# Patient Record
Sex: MALE | Race: BLACK OR AFRICAN AMERICAN | Employment: FULL TIME | URBAN - METROPOLITAN AREA
[De-identification: names, ages, dates, MRNs, and addresses within clinical notes are randomized per-mention and may not be internally consistent; named-entity substitution may affect disease eponyms.]

---

## 2017-04-14 ENCOUNTER — HOSPITAL ENCOUNTER (EMERGENCY)
Age: 62
Discharge: HOME | End: 2017-04-14
Attending: EMERGENCY MEDICINE
Payer: COMMERCIAL

## 2017-04-14 ENCOUNTER — APPOINTMENT (OUTPATIENT)
Dept: GENERAL RADIOLOGY | Age: 62
End: 2017-04-14
Attending: EMERGENCY MEDICINE
Payer: COMMERCIAL

## 2017-04-14 VITALS
RESPIRATION RATE: 18 BRPM | TEMPERATURE: 98.3 F | OXYGEN SATURATION: 96 % | WEIGHT: 221.2 LBS | HEIGHT: 69 IN | SYSTOLIC BLOOD PRESSURE: 149 MMHG | BODY MASS INDEX: 32.76 KG/M2 | DIASTOLIC BLOOD PRESSURE: 91 MMHG | HEART RATE: 77 BPM

## 2017-04-14 DIAGNOSIS — J30.89 ENVIRONMENTAL AND SEASONAL ALLERGIES: ICD-10-CM

## 2017-04-14 DIAGNOSIS — J02.9 ACUTE VIRAL PHARYNGITIS: Primary | ICD-10-CM

## 2017-04-14 LAB
GLUCOSE BLD STRIP.AUTO-MCNC: 117 MG/DL (ref 65–100)
SERVICE CMNT-IMP: ABNORMAL

## 2017-04-14 PROCEDURE — 70360 X-RAY EXAM OF NECK: CPT

## 2017-04-14 PROCEDURE — 99283 EMERGENCY DEPT VISIT LOW MDM: CPT

## 2017-04-14 PROCEDURE — 71020 XR CHEST PA LAT: CPT

## 2017-04-14 PROCEDURE — 82962 GLUCOSE BLOOD TEST: CPT

## 2017-04-14 PROCEDURE — 87070 CULTURE OTHR SPECIMN AEROBIC: CPT | Performed by: EMERGENCY MEDICINE

## 2017-04-14 NOTE — ED TRIAGE NOTES
Triage note: Pt states he has been experiencing nasal drainage since yesterday and has not used OTC treatments.

## 2017-04-14 NOTE — ED PROVIDER NOTES
HPI Comments: 62 y.o. male with no significant past medical history who presents from home with chief complaint of sore throat. Pt states that he started having diaphoresis, sore throat, weakness, subjective fever, ear tingling, chills, hoarse voice, and a non productive cough 24 hours ago at work. Pt says that he has had difficulty swallowing solids with the sore throat. He says that he has the same symptoms every year when the pollen starts coming out. He does not have a PCP and has not had a regular check up in 4 years. He has not taken any medications for the symptoms other than some Halls for the sore throat. He was holding his grandson last week who he thinks may have had strep and similar symptoms. Pt denies any chest pain, SOB, ear pain, HA, or hearing loss. Pt also denies any wheezing but his wife states that she thinks he has been wheezing some. Wife states that  drinks a lot of fluids and urinates frequently during the night. Pt has no history of asthma or eczema. There are no other acute medical concerns at this time. 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 Denies tobacco use. Significant FMHx: Sisters have diabetes  PCP: None    Old Chart Review:Seen September 2015 for itchy rash and July 2015 for back pain and sinusitis    Note written by Jag Zaman, as dictated by Hallie Zarate DO 9:06 AM      The history is provided by the patient and the spouse. No  was used. History reviewed. No pertinent past medical history. No hx of DM  History reviewed. No pertinent surgical history. History reviewed. No pertinent family history. Social History     Social History    Marital status:      Spouse name: N/A    Number of children: N/A    Years of education: N/A     Occupational History    Not on file. Social History Main Topics    Smoking status: Never Smoker    Smokeless tobacco: Never Used    Alcohol use No    Drug use:  No  Sexual activity: Not on file     Other Topics Concern    Not on file     Social History Narrative         ALLERGIES: Review of patient's allergies indicates no known allergies. Review of Systems   Constitutional: Positive for chills, diaphoresis and fever. Negative for appetite change. HENT: Positive for sore throat. Negative for ear pain, hearing loss and voice change. Respiratory: Positive for cough and wheezing. Negative for shortness of breath. Cardiovascular: Negative for chest pain. Neurological: Positive for weakness. Negative for headaches. All other systems reviewed and are negative. Vitals:    04/14/17 0821   BP: (!) 149/91   Pulse: 77   Resp: 18   Temp: 98.3 °F (36.8 °C)   SpO2: 96%   Weight: 100.3 kg (221 lb 3.2 oz)   Height: 5' 9\" (1.753 m)            Physical Exam      Constitutional: Pt is awake and alert. Pt appears well-developed and well-nourished. NAD. HENT:   Head: Normocephalic and atraumatic. Nose: Nose normal.   Mouth/Throat: Oropharynx is clear and moist. No oropharyngeal exudate. Moist mucous membranes. Uvula midline. Slight fullness on both sides of the uvula. No drooling. Normal Voice. Eyes: Conjunctivae and extraocular motions are normal. Pupils are equal, round, and reactive to light. Ears:Small amount of fluid behind the L and R TM. Good cone of light reflex in both TMs bilaterally. Right eye exhibits no discharge. Left eye exhibits no discharge. No scleral icterus. Neck: No tracheal deviation present. Supple neck. No adenopathy  Cardiovascular: Normal rate, regular rhythm, normal heart sounds and intact distal pulses. Exam reveals no gallop and no friction rub. No murmur heard. Pulmonary/Chest: Effort normal and breath sounds normal.  Pt  has no wheezes. Pt  has no rales. Abdominal: Soft. Pt  exhibits no distension and no mass. No tenderness. Pt  has no rebound and no guarding. Musculoskeletal:  Pt  exhibits no edema and no tenderness. Ext: Normal ROM in all four extremities; not tender to palpation; distal pulses are normal, no edema. Neurological:  Pt is alert. nonfocal neuro exam.  Skin: Skin is warm and dry. Pt  is not diaphoretic. Psychiatric:  Pt  has a normal mood and affect. Behavior is normal.     Note written by Jag Mendez, as dictated by No att. providers found 9:10 AM    [facility]  ED Course       Procedures      PROGRESS NOTE:  8:59 AM  Reviewing xray of chest and neck     Labs Reviewed   GLUCOSE, POC - Abnormal; Notable for the following:        Result Value    Glucose (POC) 117 (*)     All other components within normal limits   CULTURE, THROAT       Could be viral illness  Could also be seasonal allergies. pollen count very high these days in [place]. DC home  otc meds  Referral to a new PCP to establish care. Wife will try her albuterol inhaler if he wheezes again.

## 2017-04-14 NOTE — DISCHARGE INSTRUCTIONS
Tylenol, ibuprofen as needed for fevers, aches, pain  Go to the drug store and buy an over the counter steroid nasal spray like NASONEX/FLONASE. Ask the pharmacist for help if needed. Return if your throat swells, you have difficulty talking, eating, drinking or have uncontrollable drooling. I think this is either a virus from your grandkids or a reaction to the pollen. We hope that we have addressed all of your medical concerns. The examination and treatment you received in the Emergency Department were for an emergent problem and were not intended as complete care. It is important that you follow up with your healthcare provider(s) for ongoing care. If your symptoms worsen or do not improve as expected, and you are unable to reach your usual health care provider(s), you should return to the Emergency Department. Today's healthcare is undergoing tremendous change, and patient satisfaction surveys are one of the many tools to assess the quality of medical care. You may receive a survey from the [employer] regarding your experience in the Emergency Department. I hope that your experience has been completely positive, particularly the medical care that I provided. As such, please participate in the survey; anything less than excellent does not meet my expectations or intentions. [facility] and [facility] participate in nationally recognized quality of care measures. If your blood pressure is greater than 120/80, as reported below, we urge that you seek medical care to address the potential of high blood pressure, commonly known as hypertension. Hypertension can be hereditary or can be caused by certain medical conditions, pain, stress, or \"white coat syndrome. \"       Please make an appointment with your health care provider(s) for follow up of your Emergency Department visit.        VITALS:   Patient Vitals for the past 8 hrs: Temp Pulse Resp BP SpO2   04/14/17 0821 98.3 °F (36.8 °C) 77 18 (!) 149/91 96 %          Thank you for allowing us to provide you with medical care today. We realize that you have many choices for your emergency care needs. Please choose us in the future for any continued health care needs. Regards,           Nico Olmstead, [facility]   Office: [phone]            Recent Results (from the past 24 hour(s))   GLUCOSE, POC    Collection Time: 04/14/17  8:54 AM   Result Value Ref Range    Glucose (POC) 117 (H) 65 - 100 mg/dL    Performed by [employer]        Xr Neck Soft Tissue    Result Date: 4/14/2017  INDICATION:   Allergies and sore throat EXAMINATION:  NECK FOR SOFT TISSUE COMPARISON: None FINDINGS: AP and lateral views of the cervical soft tissues demonstrate no prevertebral soft tissue swelling. The epiglottis is normal. There is no radiopaque foreign body. IMPRESSION: No acute process. Xr Chest Pa Lat    Result Date: 4/14/2017  INDICATION:   Allergies, sore throat COMPARISON: None FINDINGS: Frontal and lateral views of the chest demonstrate a normal cardiomediastinal silhouette. The lungs are adequately expanded. There is no edema, effusion, consolidation, or pneumothorax. Mild degenerative changes in the spine. IMPRESSION: No acute process. Managing Your Allergies: Care Instructions  Your Care Instructions  Managing your allergies is an important part of staying healthy. Your doctor will help you find out what may be causing the allergies. Common causes of allergy symptoms are house dust and dust mites, animal dander, mold, and pollen. As soon as you know what triggers your symptoms, try to reduce your exposure to your triggers. This can help prevent allergy symptoms, asthma, and other health problems. Ask your doctor about allergy medicine or immunotherapy. These treatments may help reduce or prevent allergy symptoms.   Follow-up care is a key part of your treatment and safety. Be sure to make and go to all appointments, and call your doctor if you are having problems. It's also a good idea to know your test results and keep a list of the medicines you take. How can you care for yourself at home? · If you think that dust or dust mites are causing your allergies:  ¨ Wash sheets, pillowcases, and other bedding every week in hot water. ¨ Use airtight, dust-proof covers for pillows, duvets, and mattresses. Avoid plastic covers, because they tend to tear quickly and do not \"breathe. \" Wash according to the instructions. ¨ Remove extra blankets and pillows that you don't need. ¨ Use blankets that are machine-washable. ¨ Don't use home humidifiers. They can help mites live longer. · Use air-conditioning. Change or clean all filters every month. Keep windows closed. Use high-efficiency air filters. Don't use window or attic fans, which draw dust into the air. · If you're allergic to pet dander, keep pets outside or, at the very least, out of your bedroom. Old carpet and cloth-covered furniture can hold a lot of animal dander. You may need to replace them. · Look for signs of cockroaches. Use cockroach baits to get rid of them. Then clean your home well. · If you're allergic to mold, don't keep indoor plants, because molds can grow in soil. Get rid of furniture, rugs, and drapes that smell musty. Check for mold in the bathroom. · If you're allergic to pollen, stay inside when pollen counts are high. · Don't smoke or let anyone else smoke in your house. Don't use fireplaces or wood-burning stoves. Avoid paint fumes, perfumes, and other strong odors. When should you call for help? Give an epinephrine shot if:  · You think you are having a severe allergic reaction. · You have symptoms in more than one body area, such as mild nausea and an itchy mouth. After giving an epinephrine shot call 911, even if you feel better.   Call 911 if:  · You have symptoms of a severe allergic reaction. These may include:  ¨ Sudden raised, red areas (hives) all over your body. ¨ Swelling of the throat, mouth, lips, or tongue. ¨ Trouble breathing. ¨ Passing out (losing consciousness). Or you may feel very lightheaded or suddenly feel weak, confused, or restless. · You have been given an epinephrine shot, even if you feel better. Call your doctor now or seek immediate medical care if:  · You have symptoms of an allergic reaction, such as:  ¨ A rash or hives (raised, red areas on the skin). ¨ Itching. ¨ Swelling. ¨ Belly pain, nausea, or vomiting. Watch closely for changes in your health, and be sure to contact your doctor if:  · Your allergies get worse. · You need help controlling your allergies. · You have questions about allergy testing. · You do not get better as expected. Where can you learn more? Go to http://triSironaefraín.info/. Enter L249 in the search box to learn more about \"Managing Your Allergies: Care Instructions. \"  Current as of: February 12, 2016  Content Version: 11.2  © 9682-8544 [employer]. Care instructions adapted under license by [employer] (which disclaims liability or warranty for this information). If you have questions about a medical condition or this instruction, always ask your healthcare professional. [address] any warranty or liability for your use of this information. Throat Culture: About This Test  What is it? A throat culture is a test to find a bacterial or fungal infection in the throat. Why is this test done? A throat culture may be done to:  · Find the cause of a sore throat. Most sore throat infections are caused by a virus. A throat culture shows the difference between a bacterial infection and a viral infection. · Check a person who may not have any symptoms of infection but who carries bacteria that can spread to others.  This person is called a carrier. How can you prepare for the test?  You don't need to do anything before you have this test. Tell your doctor if you have recently taken any antibiotics. What happens during the test?  · You will be asked to tilt your head back and open your mouth as wide as possible. · Your doctor will press your tongue down with a flat stick (tongue depressor) and then examine your mouth and throat. · A clean cotton swab will be rubbed over the back of your throat, around your tonsils, and over any red areas or sores to collect a sample. How long does the test take? · The test will take less than a minute. · Throat culture test results for bacterial infections are ready in 1 to 2 days, depending on which bacteria are being tested for. Test results for a fungus may take about 7 days. When should you call for help? Call your doctor now or seek immediate medical care if:  · You have a new or higher fever  · You have a fever with a stiff neck or severe headache. · You have new or worse trouble swallowing. · Your sore throat gets much worse on one side. Watch closely for changes in your health, and be sure to contact your doctor if:  · You do not start to feel better after 2 days (48 hours). · You do not get better as expected. Follow-up care is a key part of your treatment and safety. Be sure to make and go to all appointments, and call your doctor if you are having problems. It's also a good idea to keep a list of the medicines you take. Ask your doctor when you can expect to have your test results. Where can you learn more? Go to http://tr-efraín.info/. Enter Q933 in the search box to learn more about \"Throat Culture: About This Test.\"  Current as of: October 14, 2016  Content Version: 11.2  © 5114-2556 [employer]. Care instructions adapted under license by [employer] (which disclaims liability or warranty for this information).  If you have questions about a medical condition or this instruction, always ask your healthcare professional. [address] any warranty or liability for your use of this information. Strep Throat: Care Instructions  Your Care Instructions    Strep throat is a bacterial infection that causes sudden, severe sore throat and fever. Strep throat, which is caused by bacteria called streptococcus, is treated with antibiotics. Sometimes a strep test is necessary to tell if the sore throat is caused by strep bacteria. Treatment can help ease symptoms and may prevent future problems. Follow-up care is a key part of your treatment and safety. Be sure to make and go to all appointments, and call your doctor if you are having problems. It's also a good idea to know your test results and keep a list of the medicines you take. How can you care for yourself at home? · Take your antibiotics as directed. Do not stop taking them just because you feel better. You need to take the full course of antibiotics. · Strep throat can spread to others until 24 hours after you begin taking antibiotics. During this time, you should avoid contact with other people at work or home, especially infants and children. Do not sneeze or cough on others, and wash your hands often. Keep your drinking glass and eating utensils separate from those of others, and wash these items well in hot, soapy water. · Gargle with warm salt water at least once each hour to help reduce swelling and make your throat feel better. Use 1 teaspoon of salt mixed in 8 fluid ounces of warm water. · Take an over-the-counter pain medication, such as acetaminophen (Tylenol), ibuprofen (Advil, Motrin), or naproxen (Aleve). Read and follow all instructions on the label. · Try an over-the-counter anesthetic throat spray or throat lozenges, which may help relieve throat pain. · Drink plenty of fluids. Fluids may help soothe an irritated throat.  Hot fluids, such as tea or soup, may help your throat feel better. · Eat soft solids and drink plenty of clear liquids. Flavored ice pops, ice cream, scrambled eggs, sherbet, and gelatin dessert (such as Jell-O) may also soothe the throat. · Get lots of rest.  · Do not smoke, and avoid secondhand smoke. If you need help quitting, talk to your doctor about stop-smoking programs and medicines. These can increase your chances of quitting for good. · Use a vaporizer or humidifier to add moisture to the air in your bedroom. Follow the directions for cleaning the machine. When should you call for help? Call your doctor now or seek immediate medical care if:  · You have a new or higher fever. · You have a fever with a stiff neck or severe headache. · You have new or worse trouble swallowing. · Your sore throat gets much worse on one side. · Your pain becomes much worse on one side of your throat. Watch closely for changes in your health, and be sure to contact your doctor if:  · You are not getting better after 2 days (48 hours). · You do not get better as expected. Where can you learn more? Go to http://tr-efraín.info/. Enter K625 in the search box to learn more about \"Strep Throat: Care Instructions. \"  Current as of: July 29, 2016  Content Version: 11.2  © 1646-6197 [employer]. Care instructions adapted under license by [employer] (which disclaims liability or warranty for this information). If you have questions about a medical condition or this instruction, always ask your healthcare professional. [place] [address] any warranty or liability for your use of this information.

## 2017-04-16 LAB
BACTERIA SPEC CULT: NORMAL
SERVICE CMNT-IMP: NORMAL

## 2017-08-25 ENCOUNTER — HOSPITAL ENCOUNTER (EMERGENCY)
Age: 62
Discharge: HOME | End: 2017-08-25
Attending: EMERGENCY MEDICINE
Payer: COMMERCIAL

## 2017-08-25 VITALS
SYSTOLIC BLOOD PRESSURE: 144 MMHG | OXYGEN SATURATION: 94 % | TEMPERATURE: 98.4 F | RESPIRATION RATE: 16 BRPM | BODY MASS INDEX: 31.1 KG/M2 | DIASTOLIC BLOOD PRESSURE: 82 MMHG | HEART RATE: 77 BPM | HEIGHT: 69 IN | WEIGHT: 210 LBS

## 2017-08-25 DIAGNOSIS — L03.012 PARONYCHIA, LEFT: Primary | ICD-10-CM

## 2017-08-25 PROCEDURE — 87077 CULTURE AEROBIC IDENTIFY: CPT | Performed by: PHYSICIAN ASSISTANT

## 2017-08-25 PROCEDURE — 87185 SC STD ENZYME DETCJ PER NZM: CPT | Performed by: PHYSICIAN ASSISTANT

## 2017-08-25 PROCEDURE — 87186 SC STD MICRODIL/AGAR DIL: CPT | Performed by: PHYSICIAN ASSISTANT

## 2017-08-25 PROCEDURE — 74011000250 HC RX REV CODE- 250

## 2017-08-25 PROCEDURE — 99282 EMERGENCY DEPT VISIT SF MDM: CPT

## 2017-08-25 PROCEDURE — 75810000289 HC I&D ABSCESS SIMP/COMP/MULT

## 2017-08-25 PROCEDURE — 87205 SMEAR GRAM STAIN: CPT | Performed by: PHYSICIAN ASSISTANT

## 2017-08-25 PROCEDURE — 74011000250 HC RX REV CODE- 250: Performed by: PHYSICIAN ASSISTANT

## 2017-08-25 PROCEDURE — 87147 CULTURE TYPE IMMUNOLOGIC: CPT | Performed by: PHYSICIAN ASSISTANT

## 2017-08-25 RX ORDER — HYDROCODONE BITARTRATE AND ACETAMINOPHEN 5; 325 MG/1; MG/1
1 TABLET ORAL
Qty: 20 TAB | Refills: 0 | Status: SHIPPED | OUTPATIENT
Start: 2017-08-25 | End: 2018-08-08

## 2017-08-25 RX ORDER — BACITRACIN 500 UNIT/G
PACKET (EA) TOPICAL
Status: COMPLETED
Start: 2017-08-25 | End: 2017-08-25

## 2017-08-25 RX ORDER — BACITRACIN 500 UNIT/G
1 PACKET (EA) TOPICAL
Status: COMPLETED | OUTPATIENT
Start: 2017-08-25 | End: 2017-08-25

## 2017-08-25 RX ORDER — BUPIVACAINE HYDROCHLORIDE 5 MG/ML
3 INJECTION, SOLUTION EPIDURAL; INTRACAUDAL
Status: COMPLETED | OUTPATIENT
Start: 2017-08-25 | End: 2017-08-25

## 2017-08-25 RX ORDER — SULFAMETHOXAZOLE AND TRIMETHOPRIM 800; 160 MG/1; MG/1
1 TABLET ORAL 2 TIMES DAILY
Qty: 20 TAB | Refills: 0 | Status: SHIPPED | OUTPATIENT
Start: 2017-08-25 | End: 2017-09-04

## 2017-08-25 RX ORDER — LIDOCAINE HYDROCHLORIDE 10 MG/ML
3 INJECTION, SOLUTION EPIDURAL; INFILTRATION; INTRACAUDAL; PERINEURAL ONCE
Status: COMPLETED | OUTPATIENT
Start: 2017-08-25 | End: 2017-08-25

## 2017-08-25 RX ADMIN — BACITRACIN 1 PACKET: 500 OINTMENT TOPICAL at 13:38

## 2017-08-25 RX ADMIN — LIDOCAINE HYDROCHLORIDE 3 ML: 10 INJECTION, SOLUTION EPIDURAL; INFILTRATION; INTRACAUDAL; PERINEURAL at 12:16

## 2017-08-25 RX ADMIN — Medication 1 PACKET: at 13:38

## 2017-08-25 RX ADMIN — BUPIVACAINE HYDROCHLORIDE 15 MG: 5 INJECTION, SOLUTION EPIDURAL; INTRACAUDAL at 12:16

## 2017-08-25 NOTE — ED TRIAGE NOTES
Triage: Pt. Injured left 4th finger 1 week ago while moving boxes. Pt. States injury occurred from sliding a box over his finger. Complains of pain and swelling to site. Rates pain 9/10.

## 2017-08-25 NOTE — ED PROVIDER NOTES
HPI Comments: 63 y/o AA male presents tot he ED for the evaluation of left 4th finger infection. According to patient, he injured his finger about one week ago while moving boxes and since then he has noticed some infection that has been getting worse. He is not diabetic. No fevers/chills. No nausea/vomiting. No other acute medical complaints expressed at this time. The history is provided by the patient. No past medical history on file. No past surgical history on file. No family history on file. Social History     Social History    Marital status:      Spouse name: N/A    Number of children: N/A    Years of education: N/A     Occupational History    Not on file. Social History Main Topics    Smoking status: Never Smoker    Smokeless tobacco: Never Used    Alcohol use No    Drug use: No    Sexual activity: Not on file     Other Topics Concern    Not on file     Social History Narrative         ALLERGIES: Review of patient's allergies indicates no known allergies. Review of Systems   Constitutional: Negative for chills and fever. Gastrointestinal: Negative for nausea and vomiting. Skin: Positive for wound. Negative for rash. Neurological: Negative for weakness and numbness. Hematological: Does not bruise/bleed easily. All other systems reviewed and are negative. Vitals:    08/25/17 1136   BP: 144/82   Pulse: 77   Resp: 16   Temp: 98.4 °F (36.9 °C)   SpO2: 94%   Weight: 95.3 kg (210 lb)   Height: 5' 9\" (1.753 m)            Physical Exam   Constitutional: He is oriented to person, place, and time. He appears well-developed and well-nourished. No distress. HENT:   Head: Normocephalic and atraumatic. Eyes: EOM are normal. Pupils are equal, round, and reactive to light. Neck: Normal range of motion. Neck supple. Cardiovascular: Normal rate, regular rhythm, normal heart sounds and intact distal pulses. No murmur heard.   Pulmonary/Chest: Effort normal and breath sounds normal. No respiratory distress. He has no wheezes. He has no rales. He exhibits no tenderness. Musculoskeletal: Normal range of motion. Hands:  Neurological: He is alert and oriented to person, place, and time. Skin: Skin is warm and dry. No rash noted. No erythema. Psychiatric: He has a normal mood and affect. His behavior is normal.   Nursing note and vitals reviewed. [facility]  ED Course       [facility]  Date/Time: 8/25/2017 1:35 PM  Performed by: Hector Gutierrez  Authorized by: Hector Gutierrez     Consent:     Consent obtained:  Verbal    Consent given by:  Patient    Risks discussed:  Bleeding, incomplete drainage, pain and infection  Location:     Indications for incision and drainage: paronychia. Location:  Upper extremity    Upper extremity location:  Finger    Finger location:  L ring finger  Pre-procedure details:     Skin preparation:  Betadine  Anesthesia (see MAR for exact dosages): Anesthesia method:  Nerve block    Block location:  Digital     Block needle gauge:  25 G    Block anesthetic:  Lidocaine 1% w/o epi and bupivacaine 0.5% w/o epi    Block injection procedure:  Introduced needle    Block outcome:  Anesthesia achieved  Procedure type:     Complexity:  Simple  Procedure details:     Needle aspiration: no      Scalpel size: 18 gauge needle     Wound management:  Probed and deloculated    Drainage:  Purulent    Drainage amount: Moderate    Wound treatment:  Wound left open    Packing materials:  None  Post-procedure details:     Patient tolerance of procedure:   Tolerated well, no immediate complications      Will d/c home with bactrim and norco pending wound culture  Patient verbalizes understanding of dx and is aware of what s/sx to monitor that would warrant return visit to ED  Discussed with Dr. Smith

## 2017-08-25 NOTE — LETTER
[address] 
[address] [facility] [id number] 
[phone] Work/School Note Date: 8/25/2017 To Whom It May concern: 
 
Charley Delgado was seen and treated today in the emergency room by the following provider(s): 
Attending Provider: Kel Farnsworth MD 
Physician Assistant: Bhakti Talavera. Charley Delgado may return to work on Monday, 8/28/2017. Sincerely, Marlene Thompson RN

## 2017-08-25 NOTE — ED NOTES
Pt finger wrapped with 2x2 and tube gauze. Secured with tape. Pt tolerated well. Pt given discharge instructions and prescription by provider. RN provided work note. Pt ambulatory out of ER with steady gait.

## 2017-08-25 NOTE — DISCHARGE INSTRUCTIONS
Paronychia: Care Instructions  Your Care Instructions  Paronychia (say \"wlbv-be-KW-elda-uh\") is an infection of the skin around a fingernail or toenail. It happens when germs enter through a break in the skin. The doctor may have made a small cut in the infected area to drain the pus. Most cases of paronychia improve in a few days. But watch your symptoms and follow your doctor's advice. Though rare, a mild case can turn into something more serious and infect your entire finger or toe. Also, it is possible for an infection to return. Follow-up care is a key part of your treatment and safety. Be sure to make and go to all appointments, and call your doctor if you are having problems. It's also a good idea to know your test results and keep a list of the medicines you take. How can you care for yourself at home? · If your doctor told you how to care for your infected nail, follow the doctor's instructions. If you did not get instructions, follow this general advice:  ¨ Wash the area with clean water 2 times a day. Don't use hydrogen peroxide or alcohol, which can slow healing. ¨ You may cover the area with a thin layer of petroleum jelly, such as Vaseline, and a nonstick bandage. ¨ Apply more petroleum jelly and replace the bandage as needed. · If your doctor prescribed antibiotics, take them as directed. Do not stop taking them just because you feel better. You need to take the full course of antibiotics. · Take an over-the-counter pain medicine, such as acetaminophen (Tylenol), ibuprofen (Advil, Motrin), or naproxen (Aleve). Read and follow all instructions on the label. · Do not take two or more pain medicines at the same time unless the doctor told you to. Many pain medicines have acetaminophen, which is Tylenol. Too much acetaminophen (Tylenol) can be harmful. · Prop up the toe or finger so that it is higher than the level of your heart. This will help with pain and swelling. · Apply heat.  Put a warm water bottle, heating pad set on low, or warm cloth on your finger or toe. Do not go to sleep with a heating pad on your skin. · Soak the area in warm water twice a day for 15 minutes each time. After soaking, dry the area well and apply a thin layer of petroleum jelly, such as Vaseline. Put on a new bandage. When should you call for help? Call your doctor now or seek immediate medical care if:  · You have signs of new or worsening infection, such as:  ¨ Increased pain, swelling, warmth, or redness. ¨ Red streaks leading from the infected skin. ¨ Pus draining from the area. ¨ A fever. Watch closely for changes in your health, and be sure to contact your doctor if:  · You do not get better as expected. Where can you learn more? Go to http://tr-efraín.info/. Enter C435 in the search box to learn more about \"Paronychia: Care Instructions. \"  Current as of: October 13, 2016  Content Version: 11.3  © 7244-8903 [employer]. Care instructions adapted under license by [employer] (which disclaims liability or warranty for this information). If you have questions about a medical condition or this instruction, always ask your healthcare professional. [address] any warranty or liability for your use of this information.

## 2017-08-28 LAB
BACTERIA SPEC CULT: ABNORMAL
GRAM STN SPEC: ABNORMAL
SERVICE CMNT-IMP: ABNORMAL

## 2018-02-02 ENCOUNTER — APPOINTMENT (OUTPATIENT)
Dept: GENERAL RADIOLOGY | Age: 63
End: 2018-02-02
Attending: EMERGENCY MEDICINE
Payer: COMMERCIAL

## 2018-02-02 ENCOUNTER — HOSPITAL ENCOUNTER (EMERGENCY)
Age: 63
Discharge: HOME | End: 2018-02-02
Attending: EMERGENCY MEDICINE
Payer: COMMERCIAL

## 2018-02-02 VITALS
BODY MASS INDEX: 34.96 KG/M2 | WEIGHT: 236 LBS | OXYGEN SATURATION: 98 % | HEIGHT: 69 IN | TEMPERATURE: 98.5 F | DIASTOLIC BLOOD PRESSURE: 92 MMHG | RESPIRATION RATE: 16 BRPM | SYSTOLIC BLOOD PRESSURE: 164 MMHG | HEART RATE: 78 BPM

## 2018-02-02 DIAGNOSIS — R50.9 FEVER, UNSPECIFIED FEVER CAUSE: ICD-10-CM

## 2018-02-02 DIAGNOSIS — J45.909 MILD REACTIVE AIRWAYS DISEASE, UNSPECIFIED WHETHER PERSISTENT: ICD-10-CM

## 2018-02-02 DIAGNOSIS — R05.9 COUGH: Primary | ICD-10-CM

## 2018-02-02 LAB
FLUAV AG NPH QL IA: NEGATIVE
FLUBV AG NOSE QL IA: NEGATIVE

## 2018-02-02 PROCEDURE — 74011250636 HC RX REV CODE- 250/636: Performed by: EMERGENCY MEDICINE

## 2018-02-02 PROCEDURE — 71046 X-RAY EXAM CHEST 2 VIEWS: CPT

## 2018-02-02 PROCEDURE — 87804 INFLUENZA ASSAY W/OPTIC: CPT | Performed by: STUDENT IN AN ORGANIZED HEALTH CARE EDUCATION/TRAINING PROGRAM

## 2018-02-02 PROCEDURE — 99283 EMERGENCY DEPT VISIT LOW MDM: CPT

## 2018-02-02 PROCEDURE — 94640 AIRWAY INHALATION TREATMENT: CPT

## 2018-02-02 PROCEDURE — 96372 THER/PROPH/DIAG INJ SC/IM: CPT

## 2018-02-02 PROCEDURE — 74011000250 HC RX REV CODE- 250: Performed by: EMERGENCY MEDICINE

## 2018-02-02 PROCEDURE — 77030029684 HC NEB SM VOL KT MONA -A

## 2018-02-02 PROCEDURE — 74011636637 HC RX REV CODE- 636/637: Performed by: EMERGENCY MEDICINE

## 2018-02-02 PROCEDURE — 74011250637 HC RX REV CODE- 250/637: Performed by: EMERGENCY MEDICINE

## 2018-02-02 RX ORDER — ALBUTEROL SULFATE 90 UG/1
2 AEROSOL, METERED RESPIRATORY (INHALATION)
Status: DISCONTINUED | OUTPATIENT
Start: 2018-02-02 | End: 2018-02-02 | Stop reason: HOSPADM

## 2018-02-02 RX ORDER — PREDNISONE 20 MG/1
40 TABLET ORAL DAILY
Qty: 8 TAB | Refills: 0 | Status: SHIPPED | OUTPATIENT
Start: 2018-02-02 | End: 2018-02-06

## 2018-02-02 RX ORDER — ALBUTEROL SULFATE 90 UG/1
2 AEROSOL, METERED RESPIRATORY (INHALATION)
Qty: 1 INHALER | Refills: 0 | Status: SHIPPED | OUTPATIENT
Start: 2018-02-02 | End: 2022-05-08

## 2018-02-02 RX ORDER — CODEINE PHOSPHATE AND GUAIFENESIN 10; 100 MG/5ML; MG/5ML
5 SOLUTION ORAL
Qty: 120 ML | Refills: 0 | Status: SHIPPED | OUTPATIENT
Start: 2018-02-02 | End: 2018-08-08

## 2018-02-02 RX ORDER — KETOROLAC TROMETHAMINE 30 MG/ML
60 INJECTION, SOLUTION INTRAMUSCULAR; INTRAVENOUS
Status: COMPLETED | OUTPATIENT
Start: 2018-02-02 | End: 2018-02-02

## 2018-02-02 RX ORDER — PREDNISONE 20 MG/1
60 TABLET ORAL
Status: COMPLETED | OUTPATIENT
Start: 2018-02-02 | End: 2018-02-02

## 2018-02-02 RX ADMIN — KETOROLAC TROMETHAMINE 60 MG: 30 INJECTION, SOLUTION INTRAMUSCULAR at 15:53

## 2018-02-02 RX ADMIN — ALBUTEROL SULFATE 1 DOSE: 2.5 SOLUTION RESPIRATORY (INHALATION) at 16:10

## 2018-02-02 RX ADMIN — PREDNISONE 60 MG: 20 TABLET ORAL at 15:53

## 2018-02-02 RX ADMIN — ALBUTEROL SULFATE 2 PUFF: 90 AEROSOL, METERED RESPIRATORY (INHALATION) at 17:06

## 2018-02-02 NOTE — LETTER
NOTIFICATION RETURN TO WORK / SCHOOL 
 
2/2/2018 4:32 PM 
 
Mr. Jose Barrett [address] [facility] [address] To Whom It May Concern: 
 
Jose Barrett is currently under the care of [facility]. He will return to work/school on: 2/5/18 If there are questions or concerns please have the patient contact our office. Sincerely, Amelia Quinones NP

## 2018-02-02 NOTE — ED NOTES
Discharge instructions given to patient by MD and nurse. Pt has been given counseling on medication use and verbalizes understanding.  Pt awaiting resp therapist for MDI instructions before dc      5:08 PM  Resp therapist reviewed MDI instructions and pt ambulated off of unit in no signs of distress

## 2018-02-02 NOTE — ED TRIAGE NOTES
Pt c/o having fever and generalized body aches for 2 days. Pt did not check temp. +cough.  Denies n/v/d, abd pain

## 2018-02-02 NOTE — DISCHARGE INSTRUCTIONS
Viral Infections: Care Instructions  Your Care Instructions    You don't feel well, but it's not clear what's causing it. You may have a viral infection. Viruses cause many illnesses, such as the common cold, influenza, fever, rashes, and the diarrhea, nausea, and vomiting that are often called \"stomach flu. \" You may wonder if antibiotic medicines could make you feel better. But antibiotics only treat infections caused by bacteria. They don't work on viruses. The good news is that viral infections usually aren't serious. Most will go away in a few days without medical treatment. In the meantime, there are a few things you can do to make yourself more comfortable. Follow-up care is a key part of your treatment and safety. Be sure to make and go to all appointments, and call your doctor if you are having problems. It's also a good idea to know your test results and keep a list of the medicines you take. How can you care for yourself at home? · Get plenty of rest if you feel tired. · Take an over-the-counter pain medicine if needed, such as acetaminophen (Tylenol), ibuprofen (Advil, Motrin), or naproxen (Aleve). Read and follow all instructions on the label. · Be careful when taking over-the-counter cold or flu medicines and Tylenol at the same time. Many of these medicines have acetaminophen, which is Tylenol. Read the labels to make sure that you are not taking more than the recommended dose. Too much acetaminophen (Tylenol) can be harmful. · Drink plenty of fluids, enough so that your urine is light yellow or clear like water. If you have kidney, heart, or liver disease and have to limit fluids, talk with your doctor before you increase the amount of fluids you drink. · Stay home from work, school, and other public places while you have a fever. When should you call for help? Call 911 anytime you think you may need emergency care. For example, call if:  ? · You have severe trouble breathing.    ? · You passed out (lost consciousness). ?Call your doctor now or seek immediate medical care if:  ? · You seem to be getting much sicker. ? · You have a new or higher fever. ? · You have blood in your stools. ? · You have new belly pain, or your pain gets worse. ? · You have a new rash. ? Watch closely for changes in your health, and be sure to contact your doctor if:  ? · You start to get better and then get worse. ? · You do not get better as expected. Where can you learn more? Go to http://tr-efraín.info/. Enter A358 in the search box to learn more about \"Viral Infections: Care Instructions. \"  Current as of: March 3, 2017  Content Version: 11.4  © 4420-7679 [employer]. Care instructions adapted under license by [employer] (which disclaims liability or warranty for this information). If you have questions about a medical condition or this instruction, always ask your healthcare professional. [place] [address] any warranty or liability for your use of this information. Cough: Care Instructions  Your Care Instructions    A cough is your body's response to something that bothers your throat or airways. Many things can cause a cough. You might cough because of a cold or the flu, bronchitis, or asthma. Smoking, postnasal drip, allergies, and stomach acid that backs up into your throat also can cause coughs. A cough is a symptom, not a disease. Most coughs stop when the cause, such as a cold, goes away. You can take a few steps at home to cough less and feel better. Follow-up care is a key part of your treatment and safety. Be sure to make and go to all appointments, and call your doctor if you are having problems. It's also a good idea to know your test results and keep a list of the medicines you take. How can you care for yourself at home? · Drink lots of water and other fluids.  This helps thin the mucus and soothes a dry or sore throat. Honey or lemon juice in hot water or tea may ease a dry cough. · Take cough medicine as directed by your doctor. · Prop up your head on pillows to help you breathe and ease a dry cough. · Try cough drops to soothe a dry or sore throat. Cough drops don't stop a cough. Medicine-flavored cough drops are no better than candy-flavored drops or hard candy. · Do not smoke. Avoid secondhand smoke. If you need help quitting, talk to your doctor about stop-smoking programs and medicines. These can increase your chances of quitting for good. When should you call for help? Call 911 anytime you think you may need emergency care. For example, call if:  ? · You have severe trouble breathing. ?Call your doctor now or seek immediate medical care if:  ? · You cough up blood. ? · You have new or worse trouble breathing. ? · You have a new or higher fever. ? · You have a new rash. ? Watch closely for changes in your health, and be sure to contact your doctor if:  ? · You cough more deeply or more often, especially if you notice more mucus or a change in the color of your mucus. ? · You have new symptoms, such as a sore throat, an earache, or sinus pain. ? · You do not get better as expected. Where can you learn more? Go to http://tr-efraín.info/. Enter D279 in the search box to learn more about \"Cough: Care Instructions. \"  Current as of: May 12, 2017  Content Version: 11.4  © 2330-2026 [email or web address]. Care instructions adapted under license by [employer] (which disclaims liability or warranty for this information). If you have questions about a medical condition or this instruction, always ask your healthcare professional. [address] any warranty or liability for your use of this information. Learning About Fever  What is a fever? A fever is a high body temperature. It's one way your body fights being sick.  A fever shows that the body is responding to infection or other illnesses, both minor and severe. A fever is a symptom, not an illness by itself. A fever can be a sign that you are ill, but most fevers are not caused by a serious problem. You may have a fever with a minor illness, such as a cold. But sometimes a very serious infection may cause little or no fever. It is important to look at other symptoms, other conditions you have, and how you feel in general. In children, notice how they act and see what symptoms they complain of. What is a normal body temperature? A normal body temperature is about 98. 6ºF. Some people have a normal temperature that is a little higher or a little lower than this. Your temperature may be a little lower in the morning than it is later in the day. It may go up during hot weather or when you exercise, wear heavy clothes, or take a hot bath. Your temperature may also be different depending on how you take it. A temperature taken in the mouth (oral) or under the arm may be a little lower than your core temperature (rectal). What is a fever temperature? A core temperature of 100.4°F or above is considered a fever. What can cause a fever? A fever may be caused by:  · Infections. This is the most common cause of a fever. Examples of infections that can cause a fever include the flu, a kidney infection, or pneumonia. · Some medicines. · Severe trauma or injury, such as a heart attack, stroke, heatstroke, or burns. · Other medical conditions, such as arthritis and some cancers. How can you treat a fever at home? · Ask your doctor if you can take an over-the-counter pain medicine, such as acetaminophen (Tylenol), ibuprofen (Advil, Motrin), or naproxen (Aleve). Be safe with medicines. Read and follow all instructions on the label. · To prevent dehydration, drink plenty of fluids. Choose water and other caffeine-free clear liquids until you feel better.  If you have kidney, heart, or liver disease and have to limit fluids, talk with your doctor before you increase the amount of fluids you drink. Follow-up care is a key part of your treatment and safety. Be sure to make and go to all appointments, and call your doctor if you are having problems. It's also a good idea to know your test results and keep a list of the medicines you take. Where can you learn more? Go to http://tr-efraín.info/. Enter O254 in the search box to learn more about \"Learning About Fever. \"  Current as of: March 20, 2017  Content Version: 11.4  © 9223-7029 [email or web address]. Care instructions adapted under license by [employer] (which disclaims liability or warranty for this information). If you have questions about a medical condition or this instruction, always ask your healthcare professional. [address] any warranty or liability for your use of this information. Reactive Airway Disease: Care Instructions  Your Care Instructions    Reactive airway disease is a breathing problem that appears as wheezing, a whistling noise in your airways. It may be caused by a viral or bacterial infection, allergies, tobacco smoke, or something else in the environment. When you are around these triggers, your body releases chemicals that make the airways get tight. Reactive airway disease is a lot like asthma. Both can cause wheezing. But asthma is ongoing, while reactive airway disease may occur only now and then. Tests can be done to tell whether you have asthma. You may take the same medicines used to treat asthma. Good home care and follow-up care with your doctor can help you recover. Follow-up care is a key part of your treatment and safety. Be sure to make and go to all appointments, and call your doctor if you are having problems. It's also a good idea to know your test results and keep a list of the medicines you take. How can you care for yourself at home?   · Take your medicines exactly as prescribed. Call your doctor if you think you are having a problem with your medicine. · Do not smoke or allow others to smoke around you. If you need help quitting, talk to your doctor about stop-smoking programs and medicines. These can increase your chances of quitting for good. · If you know what caused your wheezing (such as perfume or the odor of household chemicals), try to avoid it in the future. · Wash your hands several times a day, and consider using hand gels or wipes that contain alcohol. This can prevent colds and other infections. When should you call for help? Call 911 anytime you think you may need emergency care. For example, call if:  ? · You have severe trouble breathing. ? Watch closely for changes in your health, and be sure to contact your doctor if:  ? · You cough up yellow, dark brown, or bloody mucus. ? · You have a fever. ? · Your wheezing gets worse. Where can you learn more? Go to http://tr-efraín.info/. Enter B255 in the search box to learn more about \"Reactive Airway Disease: Care Instructions. \"  Current as of: May 12, 2017  Content Version: 11.4  © 3839-3822 [employer]wise[employer]. Care instructions adapted under license by [employer] (which disclaims liability or warranty for this information). If you have questions about a medical condition or this instruction, always ask your healthcare professional. [address] any warranty or liability for your use of this information.

## 2018-02-02 NOTE — ED PROVIDER NOTES
Patient is a 65 y.o. male presenting with fever. Fever    Associated symptoms include cough. Pertinent negatives include no diarrhea, no vomiting, no headaches, no sore throat, no shortness of breath and no neck pain. Pt states that he has had a congested bronchospastic cough with intermittent fever and body aches for 2 days. Denies headache, neck pain, visual changes, focal weakness, unexplained weight loss or rash. Denies any difficulty breathing, difficulty swallowing, SOB or chest pain. Denies any nausea, vomiting, urinary symptoms or diarrhea. Pt. Reports that he has not taken any pain medications today prior to arrival.   Old charts reviewed. History reviewed. No pertinent past medical history. History reviewed. No pertinent surgical history. History reviewed. No pertinent family history. Social History     Social History    Marital status:      Spouse name: N/A    Number of children: N/A    Years of education: N/A     Occupational History    Not on file. Social History Main Topics    Smoking status: Never Smoker    Smokeless tobacco: Never Used    Alcohol use No    Drug use: No    Sexual activity: Not on file     Other Topics Concern    Not on file     Social History Narrative         ALLERGIES: Review of patient's allergies indicates no known allergies. Review of Systems   Constitutional: Positive for fever. Negative for activity change and appetite change. HENT: Negative for facial swelling, sore throat and trouble swallowing. Eyes: Negative. Respiratory: Positive for cough and wheezing. Negative for shortness of breath. Cardiovascular: Negative. Gastrointestinal: Negative for abdominal pain, diarrhea and vomiting. Genitourinary: Negative for dysuria. Musculoskeletal: Negative for back pain and neck pain. Skin: Negative for color change. Neurological: Negative for headaches. Psychiatric/Behavioral: Negative.         Vitals:    02/02/18 1453 BP: (!) 164/92   Pulse: 78   Resp: 16   Temp: 98.5 °F (36.9 °C)   SpO2: 97%   Weight: 107 kg (236 lb)   Height: 5' 9\" (1.753 m)            Physical Exam   Constitutional: He is oriented to person, place, and time. He appears well-nourished. Black male; non smoker   HENT:   Head: Normocephalic. Right Ear: External ear normal.   Left Ear: External ear normal.   Mouth/Throat: Oropharynx is clear and moist.   Clear rhinorrhea   Eyes: Pupils are equal, round, and reactive to light. Bilateral conjunctival injection   Neck: Normal range of motion. Neck supple. Cardiovascular: Normal rate and regular rhythm. Pulmonary/Chest: He has wheezes. Intermittent bronchospastic cough    Abdominal: Soft. Bowel sounds are normal. He exhibits no distension and no mass. There is no tenderness. There is no rebound and no guarding. Musculoskeletal: Normal range of motion. Lymphadenopathy:     He has no cervical adenopathy. Neurological: He is alert and oriented to person, place, and time. Skin: Skin is warm and dry. No rash noted. Nursing note and vitals reviewed. [facility]      ED Course       Procedures    Pt has been re-examined and is tolerating fluids well. 4:25 PM  Pt has been re-examined after nebulizer treatment and states that he is feeling better and has no new complaints. On auscultation, wheezing is significantly improved. Laboratory tests, medications, x-rays, diagnosis, follow up plan and return instructions have been reviewed and discussed with the patient and/or family. Pt and/or family have had the opportunity to ask questions about their care. Patient and/or family express understanding and agreement with care plan, including oral steroids, nebulizer or inhaler use, follow up and return instructions.   Patient and/or family agree to return in 25 hours if his symptoms are not improving or immediately if he has any change in his condition including worsening wheezing or any signs of increasing work of breathing. Brittnee Bradford NP

## 2018-02-06 ENCOUNTER — OFFICE VISIT (OUTPATIENT)
Dept: INTERNAL MEDICINE CLINIC | Age: 63
End: 2018-02-06

## 2018-02-06 ENCOUNTER — HOSPITAL ENCOUNTER (EMERGENCY)
Age: 63
Discharge: HOME | End: 2018-02-06
Attending: EMERGENCY MEDICINE
Payer: COMMERCIAL

## 2018-02-06 VITALS
HEART RATE: 87 BPM | RESPIRATION RATE: 18 BRPM | DIASTOLIC BLOOD PRESSURE: 83 MMHG | OXYGEN SATURATION: 96 % | TEMPERATURE: 98.2 F | SYSTOLIC BLOOD PRESSURE: 139 MMHG | WEIGHT: 229.8 LBS | HEIGHT: 69 IN | BODY MASS INDEX: 34.04 KG/M2

## 2018-02-06 VITALS
WEIGHT: 230.38 LBS | RESPIRATION RATE: 16 BRPM | BODY MASS INDEX: 34.12 KG/M2 | OXYGEN SATURATION: 99 % | TEMPERATURE: 98.4 F | HEART RATE: 68 BPM | DIASTOLIC BLOOD PRESSURE: 68 MMHG | SYSTOLIC BLOOD PRESSURE: 146 MMHG | HEIGHT: 69 IN

## 2018-02-06 DIAGNOSIS — R06.6 HICCUPS: ICD-10-CM

## 2018-02-06 DIAGNOSIS — R06.6 INTRACTABLE HICCUPS: Primary | ICD-10-CM

## 2018-02-06 DIAGNOSIS — Z13.21 ENCOUNTER FOR VITAMIN DEFICIENCY SCREENING: ICD-10-CM

## 2018-02-06 DIAGNOSIS — Z12.11 SCREENING FOR COLON CANCER: ICD-10-CM

## 2018-02-06 DIAGNOSIS — Z23 ENCOUNTER FOR IMMUNIZATION: ICD-10-CM

## 2018-02-06 DIAGNOSIS — Z00.00 WELLNESS EXAMINATION: Primary | ICD-10-CM

## 2018-02-06 DIAGNOSIS — Z11.59 NEED FOR HEPATITIS C SCREENING TEST: ICD-10-CM

## 2018-02-06 LAB
ALBUMIN SERPL-MCNC: 3.4 G/DL (ref 3.5–5)
ALBUMIN/GLOB SERPL: 0.7 {RATIO} (ref 1.1–2.2)
ALP SERPL-CCNC: 53 U/L (ref 45–117)
ALT SERPL-CCNC: 47 U/L (ref 12–78)
ANION GAP SERPL CALC-SCNC: 6 MMOL/L (ref 5–15)
AST SERPL-CCNC: 34 U/L (ref 15–37)
BASOPHILS # BLD: 0 K/UL (ref 0–0.1)
BASOPHILS NFR BLD: 0 % (ref 0–1)
BILIRUB SERPL-MCNC: 0.3 MG/DL (ref 0.2–1)
BUN SERPL-MCNC: 17 MG/DL (ref 6–20)
BUN/CREAT SERPL: 14 (ref 12–20)
CALCIUM SERPL-MCNC: 8.3 MG/DL (ref 8.5–10.1)
CHLORIDE SERPL-SCNC: 100 MMOL/L (ref 97–108)
CO2 SERPL-SCNC: 29 MMOL/L (ref 21–32)
CREAT SERPL-MCNC: 1.24 MG/DL (ref 0.7–1.3)
DIFFERENTIAL METHOD BLD: ABNORMAL
EOSINOPHIL # BLD: 0 K/UL (ref 0–0.4)
EOSINOPHIL NFR BLD: 0 % (ref 0–7)
ERYTHROCYTE [DISTWIDTH] IN BLOOD BY AUTOMATED COUNT: 13.3 % (ref 11.5–14.5)
GLOBULIN SER CALC-MCNC: 4.9 G/DL (ref 2–4)
GLUCOSE SERPL-MCNC: 114 MG/DL (ref 65–100)
HCT VFR BLD AUTO: 45.1 % (ref 36.6–50.3)
HGB BLD-MCNC: 15.4 G/DL (ref 12.1–17)
IMM GRANULOCYTES # BLD: 0 K/UL (ref 0–0.04)
IMM GRANULOCYTES NFR BLD AUTO: 0 % (ref 0–0.5)
LYMPHOCYTES # BLD: 2.2 K/UL (ref 0.8–3.5)
LYMPHOCYTES NFR BLD: 45 % (ref 12–49)
MCH RBC QN AUTO: 31.3 PG (ref 26–34)
MCHC RBC AUTO-ENTMCNC: 34.1 G/DL (ref 30–36.5)
MCV RBC AUTO: 91.7 FL (ref 80–99)
MONOCYTES # BLD: 0.9 K/UL (ref 0–1)
MONOCYTES NFR BLD: 17 % (ref 5–13)
NEUTS SEG # BLD: 1.9 K/UL (ref 1.8–8)
NEUTS SEG NFR BLD: 38 % (ref 32–75)
NRBC # BLD: 0 K/UL (ref 0–0.01)
NRBC BLD-RTO: 0 PER 100 WBC
PLATELET # BLD AUTO: 148 K/UL (ref 150–400)
PMV BLD AUTO: 11 FL (ref 8.9–12.9)
POTASSIUM SERPL-SCNC: 4 MMOL/L (ref 3.5–5.1)
PROT SERPL-MCNC: 8.3 G/DL (ref 6.4–8.2)
RBC # BLD AUTO: 4.92 M/UL (ref 4.1–5.7)
SODIUM SERPL-SCNC: 135 MMOL/L (ref 136–145)
WBC # BLD AUTO: 5 K/UL (ref 4.1–11.1)

## 2018-02-06 PROCEDURE — 99283 EMERGENCY DEPT VISIT LOW MDM: CPT

## 2018-02-06 PROCEDURE — 85025 COMPLETE CBC W/AUTO DIFF WBC: CPT | Performed by: EMERGENCY MEDICINE

## 2018-02-06 PROCEDURE — 36415 COLL VENOUS BLD VENIPUNCTURE: CPT | Performed by: EMERGENCY MEDICINE

## 2018-02-06 PROCEDURE — 74011250637 HC RX REV CODE- 250/637: Performed by: EMERGENCY MEDICINE

## 2018-02-06 PROCEDURE — 74011250636 HC RX REV CODE- 250/636: Performed by: EMERGENCY MEDICINE

## 2018-02-06 PROCEDURE — 80053 COMPREHEN METABOLIC PANEL: CPT | Performed by: EMERGENCY MEDICINE

## 2018-02-06 PROCEDURE — 96374 THER/PROPH/DIAG INJ IV PUSH: CPT

## 2018-02-06 PROCEDURE — 96361 HYDRATE IV INFUSION ADD-ON: CPT

## 2018-02-06 RX ORDER — BACLOFEN 10 MG/1
10 TABLET ORAL 3 TIMES DAILY
Qty: 15 TAB | Refills: 0 | Status: SHIPPED | OUTPATIENT
Start: 2018-02-06 | End: 2018-02-06

## 2018-02-06 RX ORDER — CHLORPROMAZINE HYDROCHLORIDE 25 MG/1
25 TABLET, FILM COATED ORAL 3 TIMES DAILY
Qty: 12 TAB | Refills: 0 | Status: SHIPPED | OUTPATIENT
Start: 2018-02-06 | End: 2019-01-14 | Stop reason: ALTCHOICE

## 2018-02-06 RX ORDER — CHLORPROMAZINE HYDROCHLORIDE 25 MG/1
10 TABLET, FILM COATED ORAL 3 TIMES DAILY
Qty: 12 TAB | Refills: 0 | Status: SHIPPED | OUTPATIENT
Start: 2018-02-06 | End: 2018-02-06

## 2018-02-06 RX ORDER — CHLORPROMAZINE HYDROCHLORIDE 25 MG/1
25 TABLET, FILM COATED ORAL
Status: COMPLETED | OUTPATIENT
Start: 2018-02-06 | End: 2018-02-06

## 2018-02-06 RX ORDER — CHLORPROMAZINE HYDROCHLORIDE 25 MG/1
25 TABLET, FILM COATED ORAL 2 TIMES DAILY
Status: DISCONTINUED | OUTPATIENT
Start: 2018-02-06 | End: 2018-02-06 | Stop reason: HOSPADM

## 2018-02-06 RX ORDER — BACLOFEN 10 MG/1
10 TABLET ORAL 3 TIMES DAILY
Status: DISCONTINUED | OUTPATIENT
Start: 2018-02-06 | End: 2018-02-06

## 2018-02-06 RX ADMIN — SODIUM CHLORIDE 1000 ML: 900 INJECTION, SOLUTION INTRAVENOUS at 02:56

## 2018-02-06 RX ADMIN — CHLORPROMAZINE HYDROCHLORIDE 25 MG: 25 TABLET, SUGAR COATED ORAL at 03:15

## 2018-02-06 NOTE — ED NOTES
Patient verbalizes understanding of discharge instructions. Ambulatory and in no acute distress at discharge. Accompanied by wife.

## 2018-02-06 NOTE — LETTER
[address] 
[address] [facility] [address] [id number] 
[phone] Work/School Note Date: 2/6/2018 To Whom It May concern: 
 
Aleena Guallpa was seen and treated today in the emergency room by the following provider(s): 
Attending Provider: Stephanie Banegas MD.   
 
Aleena Guallpa may return to work on Wednesday Feb. 7, 2018.  
 
Sincerely, 
 
 
 
 
Stephanie Banegas MD

## 2018-02-06 NOTE — PATIENT INSTRUCTIONS
Hiccups: Care Instructions  Your Care Instructions    Hiccups occur when a spasm contracts the diaphragm, a large sheet of muscle that separates the chest cavity from the abdominal cavity. The spasm causes an intake of breath that is suddenly stopped by the closure of the vocal cords (glottis). This closure causes the \"hiccup\" sound. A very full stomach can cause hiccups that go away on their own. A full stomach can be caused by things like eating too much food too quickly or swallowing too much air. Most hiccups go away on their own within a few minutes to a few hours and do not require any treatment. Hiccups that last longer than 48 hours are called persistent hiccups. Hiccups that last longer than a month are called intractable hiccups. Both persistent and intractable hiccups may be a sign of a more serious health problem. Follow-up care is a key part of your treatment and safety. Be sure to make and go to all appointments, and call your doctor if you are having problems. It's also a good idea to know your test results and keep a list of the medicines you take. How can you care for yourself at home? · Try these safe and easy home remedies if your hiccups are making you uncomfortable. ¨ Eat a teaspoon of sugar or honey. ¨ Hold your breath and count slowly to 10. ¨ Quickly drink a glass of cold water. · If your doctor prescribed medicine, take it as directed. Call your doctor if you think you are having a problem with your medicine. To help prevent hiccups  · Take steps to avoid swallowing air:  ¨ Eat slowly. Avoid gulping food or beverages. ¨ Chew your food thoroughly before you swallow. ¨ Avoid drinking through a straw. ¨ Avoid chewing gum or eating hard candy. ¨ Do not smoke or use other tobacco products. ¨ If you wear dentures, check with a dentist to make sure they fit properly. · Do not eat large meals. · Do not drink alcohol.   · Avoid sudden changes in stomach temperature, such as drinking a hot beverage and then a cold beverage. · Avoid emotional stress or excitement. When should you call for help? Call your doctor now or seek immediate medical care if:  ? · You have trouble swallowing and are unable to swallow food or fluids. ? · You have hiccups for more than 2 days. ? · You have new symptoms, such as belly pain, constipation, diarrhea, heartburn, or vomiting. ? Watch closely for changes in your health, and be sure to contact your doctor if:  ? · You have trouble swallowing but are able to swallow food and fluids. ? · Hiccups occur often and get in the way of your activities. ? · You think medicine may be causing your symptoms. ? · You do not get better as expected. Where can you learn more? Go to http://tr-efraín.info/. Enter V878 in the search box to learn more about \"Hiccups: Care Instructions. \"  Current as of: March 20, 2017  Content Version: 11.4  © 8977-0095 [employer]. Care instructions adapted under license by [employer] (which disclaims liability or warranty for this information). If you have questions about a medical condition or this instruction, always ask your healthcare professional. [address] any warranty or liability for your use of this information. Influenza (Flu) Vaccine (Inactivated or Recombinant): What You Need to Know  Why get vaccinated? Influenza (\"flu\") is a contagious disease that spreads around the [place] every winter, usually between October and May. Flu is caused by influenza viruses and is spread mainly by coughing, sneezing, and close contact. Anyone can get flu. Flu strikes suddenly and can last several days. Symptoms vary by age, but can include:  · Fever/chills. · Sore throat. · Muscle aches. · Fatigue. · Cough. · Headache. · Runny or stuffy nose. Flu can also lead to pneumonia and blood infections, and cause diarrhea and seizures in children. If you have a medical condition, such as heart or lung disease, flu can make it worse. Flu is more dangerous for some people. Infants and young children, people 72years of age and older, pregnant women, and people with certain health conditions or a weakened immune system are at greatest risk. Each year thousands of people in the [facility] die from flu, and many more are hospitalized. Flu vaccine can:  · Keep you from getting flu. · Make flu less severe if you do get it. · Keep you from spreading flu to your family and other people. Inactivated and recombinant flu vaccines  A dose of flu vaccine is recommended every flu season. Children 6 months through 6years of age may need two doses during the same flu season. Everyone else needs only one dose each flu season. Some inactivated flu vaccines contain a very small amount of a mercury-based preservative called thimerosal. Studies have not shown thimerosal in vaccines to be harmful, but flu vaccines that do not contain thimerosal are available. There is no live flu virus in flu shots. They cannot cause the flu. There are many flu viruses, and they are always changing. Each year a new flu vaccine is made to protect against three or four viruses that are likely to cause disease in the upcoming flu season. But even when the vaccine doesn't exactly match these viruses, it may still provide some protection. Flu vaccine cannot prevent:  · Flu that is caused by a virus not covered by the vaccine. · Illnesses that look like flu but are not. Some people should not get this vaccine  Tell the person who is giving you the vaccine:  · If you have any severe (life-threatening) allergies. If you ever had a life-threatening allergic reaction after a dose of flu vaccine, or have a severe allergy to any part of this vaccine, you may be advised not to get vaccinated. Most, but not all, types of flu vaccine contain a small amount of egg protein.   · If you ever had Guillain-Barré syndrome (also called GBS) Some people with a history of GBS should not get this vaccine. This should be discussed with your doctor. · If you are not feeling well. It is usually okay to get flu vaccine when you have a mild illness, but you might be asked to come back when you feel better. Risks of a vaccine reaction  With any medicine, including vaccines, there is a chance of reactions. These are usually mild and go away on their own, but serious reactions are also possible. Most people who get a flu shot do not have any problems with it. Minor problems following a flu shot include:  · Soreness, redness, or swelling where the shot was given  · Hoarseness  · Sore, red or itchy eyes  · Cough  · Fever  · Aches  · Headache  · Itching  · Fatigue  If these problems occur, they usually begin soon after the shot and last 1 or 2 days. More serious problems following a flu shot can include the following:  · There may be a small increased risk of Guillain-Barré Syndrome (GBS) after inactivated flu vaccine. This risk has been estimated at 1 or 2 additional cases per million people vaccinated. This is much lower than the risk of severe complications from flu, which can be prevented by flu vaccine. · Jerryl Nails children who get the flu shot along with pneumococcal vaccine (PCV13) and/or DTaP vaccine at the same time might be slightly more likely to have a seizure caused by fever. Ask your doctor for more information. Tell your doctor if a child who is getting flu vaccine has ever had a seizure  Problems that could happen after any injected vaccine:  · People sometimes faint after a medical procedure, including vaccination. Sitting or lying down for about 15 minutes can help prevent fainting, and injuries caused by a fall. Tell your doctor if you feel dizzy, or have vision changes or ringing in the ears. · Some people get severe pain in the shoulder and have difficulty moving the arm where a shot was given.  This happens very rarely. · Any medication can cause a severe allergic reaction. Such reactions from a vaccine are very rare, estimated at about 1 in a million doses, and would happen within a few minutes to a few hours after the vaccination. As with any medicine, there is a very remote chance of a vaccine causing a serious injury or death. The safety of vaccines is always being monitored. For more information, visit: www.cdc.gov/vaccinesafety/. What if there is a serious reaction? What should I look for? · Look for anything that concerns you, such as signs of a severe allergic reaction, very high fever, or unusual behavior. Signs of a severe allergic reaction can include hives, swelling of the face and throat, difficulty breathing, a fast heartbeat, dizziness, and weakness - usually within a few minutes to a few hours after the vaccination. What should I do? · If you think it is a severe allergic reaction or other emergency that can't wait, call 9-1-1 and get the person to the nearest hospital. Otherwise, call your doctor. · Reactions should be reported to the \"Vaccine Adverse Event Reporting System\" (VAERS). Your doctor should file this report, or you can do it yourself through the VAERS website at www.vaers. [facility].gov, or by calling 6-795.951.3117. [employer] does not give medical advice. The National Vaccine Injury Compensation Program  The National Vaccine Injury Compensation Program (VICP) is a federal program that was created to compensate people who may have been injured by certain vaccines. Persons who believe they may have been injured by a vaccine can learn about the program and about filing a claim by calling 5-839.887.1452 or visiting the [address] [place] [address] [employer] website at www.[facility].gov/vaccinecompensation. There is a time limit to file a claim for compensation. How can I learn more? · Ask your healthcare provider. He or she can give you the vaccine package insert or suggest other sources of information.   · Call your local or state health department. · Contact the Centers for Disease Control and Prevention (CDC):  ¨ Call 9-402.368.4501 (1-800-CDC-INFO) or  ¨ Visit CDC's website at www.cdc.gov/flu  Vaccine Information Statement  Inactivated Influenza Vaccine  8/7/2015)  [address] 102YW-09  Department of Health and Human Services  Centers for Disease Control and Prevention  Many Vaccine Information Statements are available in Nepalese and other languages. See www.immunize.org/vis. Muchas hojas de información sobre vacunas están disponibles en español y en otros idiomas. Visite www.immunize.org/vis. Care instructions adapted under license by [employer] (which disclaims liability or warranty for this information). If you have questions about a medical condition or this instruction, always ask your healthcare professional. [place] [address] any warranty or liability for your use of this information. Well Visit, Men 48 to 72: Care Instructions  Your Care Instructions    Physical exams can help you stay healthy. Your doctor has checked your overall health and may have suggested ways to take good care of yourself. He or she also may have recommended tests. At home, you can help prevent illness with healthy eating, regular exercise, and other steps. Follow-up care is a key part of your treatment and safety. Be sure to make and go to all appointments, and call your doctor if you are having problems. It's also a good idea to know your test results and keep a list of the medicines you take. How can you care for yourself at home? · Reach and stay at a healthy weight. This will lower your risk for many problems, such as obesity, diabetes, heart disease, and high blood pressure. · Get at least 30 minutes of exercise on most days of the week. Walking is a good choice. You also may want to do other activities, such as running, swimming, cycling, or playing tennis or team sports. · Do not smoke.  Smoking can make health problems worse. If you need help quitting, talk to your doctor about stop-smoking programs and medicines. These can increase your chances of quitting for good. · Protect your skin from too much sun. When you're outdoors from 10 a.m. to 4 p.m., stay in the shade or cover up with clothing and a hat with a wide brim. Wear sunglasses that block UV rays. Even when it's cloudy, put broad-spectrum sunscreen (SPF 30 or higher) on any exposed skin. · See a dentist one or two times a year for checkups and to have your teeth cleaned. · Wear a seat belt in the car. · Limit alcohol to 2 drinks a day. Too much alcohol can cause health problems. Follow your doctor's advice about when to have certain tests. These tests can spot problems early. · Cholesterol. Your doctor will tell you how often to have this done based on your overall health and other things that can increase your risk for heart attack and stroke. · Blood pressure. Have your blood pressure checked during a routine doctor visit. Your doctor will tell you how often to check your blood pressure based on your age, your blood pressure results, and other factors. · Prostate exam. Talk to your doctor about whether you should have a blood test (called a PSA test) for prostate cancer. Experts disagree on whether men should have this test. Some experts recommend that you discuss the benefits and risks of the test with your doctor. · Diabetes. Ask your doctor whether you should have tests for diabetes. · Vision. Some experts recommend that you have yearly exams for glaucoma and other age-related eye problems starting at age 48. · Hearing. Tell your doctor if you notice any change in your hearing. You can have tests to find out how well you hear. · Colon cancer. You should begin tests for colon cancer at age 48. You may have one of several tests. Your doctor will tell you how often to have tests based on your age and risk.  Risks include whether you already had a precancerous polyp removed from your colon or whether your parent, brother, sister, or child has had colon cancer. · Heart attack and stroke risk. At least every 4 to 6 years, you should have your risk for heart attack and stroke assessed. Your doctor uses factors such as your age, blood pressure, cholesterol, and whether you smoke or have diabetes to show what your risk for a heart attack or stroke is over the next 10 years. · Abdominal aortic aneurysm. Ask your doctor whether you should have a test to check for an aneurysm. You may need a test if you ever smoked or if your parent, brother, sister, or child has had an aneurysm. When should you call for help? Watch closely for changes in your health, and be sure to contact your doctor if you have any problems or symptoms that concern you. Where can you learn more? Go to http://tr-efraín.info/. Enter B579 in the search box to learn more about \"Well Visit, Men 48 to 72: Care Instructions. \"  Current as of: May 12, 2017  Content Version: 11.4  © 2263-5727 [employer]. Care instructions adapted under license by [employer] (which disclaims liability or warranty for this information). If you have questions about a medical condition or this instruction, always ask your healthcare professional. [address] any warranty or liability for your use of this information.

## 2018-02-06 NOTE — ED TRIAGE NOTES
I was seen here [place] ([facility] for fever. The medicine they gave me caused me to have the hiccups. I haven't been able to sleep , my chest and ribs hurt from the constant hiccupping. It is hard to swallow. I took a Benadryl to help loosen things up.

## 2018-02-06 NOTE — PROGRESS NOTES
Health Maintenance Due   Topic Date Due    Hepatitis C Screening  1955    DTaP/Tdap/Td series (1 - Tdap) 11/01/1977    FOBT Q 1 YEAR AGE 50-75  11/01/2006    ZOSTER VACCINE AGE 60>  09/01/2016    Influenza Age 9 to Adult  08/01/2017       Chief Complaint   Patient presents with    New Patient    Follow-up     [facility] ER, Flu, Hiccups r/t medication       1. Have you been to the ER, urgent care clinic since your last visit? Hospitalized since your last visit? Yes When: 2/6/18 Where: [facility] Reason for visit: Hiccups r/t prior visit for flu, medication possible reaction    2. Have you seen or consulted any other health care providers outside of the [address] [place] [address] since your last visit? Include any pap smears or colon screening. No    3) Do you have an Advance Directive on file? no    4) Are you interested in receiving information on Advance Directives? NO      Patient is accompanied by self I have received verbal consent from Mercy Cavanaugh to discuss any/all medical information while they are present in the room.

## 2018-02-06 NOTE — Clinical Note
No clear cause of your hiccups were found tonight. Stop the other medications prescribed and take the Baclofen three times a day as needed for hiccups. Follow up with gastroenterology if symptoms are not continuing to improve.

## 2018-02-06 NOTE — ED PROVIDER NOTES
Patient is a 65 y.o. male presenting with hiccups. Hiccups    Associated symptoms include a fever. Pertinent negatives include no nausea, no vomiting, no dysuria and no chest pain. 65year old male without significant PMHx, presents with intractable hiccups since Friday. Was seen Friday with fever, cough, URi symptoms. Flu negative. Put on steroids and Robitusson and since then has hiccups constantly. Denies abdominal pain, n/v/d. Denies chset pain or sob. History reviewed. No pertinent past medical history. History reviewed. No pertinent surgical history. History reviewed. No pertinent family history. Social History     Social History    Marital status:      Spouse name: N/A    Number of children: N/A    Years of education: N/A     Occupational History    Not on file. Social History Main Topics    Smoking status: Never Smoker    Smokeless tobacco: Never Used    Alcohol use No    Drug use: No    Sexual activity: Not on file     Other Topics Concern    Not on file     Social History Narrative         ALLERGIES: Review of patient's allergies indicates no known allergies. Review of Systems   Constitutional: Positive for fever. Eyes: Negative for visual disturbance. Respiratory: Positive for cough. Negative for shortness of breath. Cardiovascular: Negative for chest pain. Gastrointestinal: Negative for abdominal pain, nausea and vomiting. Endocrine: Negative for polyuria. Genitourinary: Negative for dysuria. Musculoskeletal: Negative for gait problem. Psychiatric/Behavioral: Negative for dysphoric mood. Vitals:    02/06/18 0028   BP: (!) 177/96   Pulse: 61   Resp: 16   Temp: 99.4 °F (37.4 °C)   SpO2: 97%   Weight: 104.5 kg (230 lb 6 oz)   Height: 5' 9\" (1.753 m)            Physical Exam   Constitutional: He is oriented to person, place, and time. He appears well-developed and well-nourished. No distress.    Constant hiccups   HENT:   Head: Normocephalic and atraumatic. Mouth/Throat: Oropharynx is clear and moist. No oropharyngeal exudate. Eyes: Conjunctivae and EOM are normal. Pupils are equal, round, and reactive to light. Right eye exhibits no discharge. Left eye exhibits no discharge. No scleral icterus. Neck: Normal range of motion. Neck supple. No JVD present. Cardiovascular: Normal rate, regular rhythm, normal heart sounds and intact distal pulses. Exam reveals no gallop and no friction rub. No murmur heard. Pulmonary/Chest: Effort normal and breath sounds normal. No stridor. No respiratory distress. He has no wheezes. He has no rales. He exhibits no tenderness. Abdominal: Soft. Bowel sounds are normal. He exhibits no distension and no mass. There is no tenderness. There is no rebound and no guarding. Musculoskeletal: Normal range of motion. He exhibits no edema or tenderness. Neurological: He is alert and oriented to person, place, and time. He has normal reflexes. No cranial nerve deficit. He exhibits normal muscle tone. Coordination normal.   Skin: Skin is warm and dry. No rash noted. No erythema. Psychiatric: He has a normal mood and affect. His behavior is normal. Judgment and thought content normal.        [facility]      ED Course       Procedures      Work up reassuring Hiccups stopped with thorazine. Will d/c with RX for thorazine. [facility]. Referred to GI if symptoms doesn't resolve.

## 2018-02-06 NOTE — MR AVS SNAPSHOT
[address] [facility] 102 34[address] 
[phone] Patient: Jackson Martin MRN: [id number] :1955 Visit Information Date & Time Provider Department Dept. Phone Encounter #  
 2018  8:20 AM Gentry Galloway NP [facility] Internal Medicine [phone] [id number] Upcoming Health Maintenance Date Due Hepatitis C Screening 1955 DTaP/Tdap/Td series (1 - Tdap) 1977 FOBT Q 1 YEAR AGE 50-75 2006 ZOSTER VACCINE AGE 60> 2016 Allergies as of 2018  Review Complete On: 2018 By: Gentry Galloway NP No Known Allergies Current Immunizations  Never Reviewed Name Date Influenza Vaccine (Quad) PF  Incomplete Not reviewed this visit You Were Diagnosed With   
  
 Codes Comments Hiccups    -  Primary ICD-10-CM: R06.6 ICD-9-CM: 786.8 Wellness examination     ICD-10-CM: Z00.00 ICD-9-CM: V70.0 Need for hepatitis C screening test     ICD-10-CM: Z11.59 
ICD-9-CM: V73.89 Encounter for vitamin deficiency screening     ICD-10-CM: Z13.21 ICD-9-CM: V77.99 Encounter for immunization     ICD-10-CM: T53 ICD-9-CM: V03.89 Vitals BP Pulse Temp Resp Height(growth percentile) Weight(growth percentile) 139/83 (BP 1 Location: Left arm, BP Patient Position: Sitting) 87 98.2 °F (36.8 °C) (Oral) 18 5' 9\" (1.753 m) 229 lb 12.8 oz (104.2 kg) SpO2 BMI Smoking Status 96% 33.94 kg/m2 Never Smoker Vitals History BMI and BSA Data Body Mass Index Body Surface Area 33.94 kg/m 2 2.25 m 2 Preferred Pharmacy Pharmacy Name Phone [address] [phone] Your Updated Medication List  
  
   
This list is accurate as of: 18  8:53 AM.  Always use your most recent med list.  
  
  
  
  
 albuterol 90 mcg/actuation inhaler Commonly known as:  PROVENTIL HFA, VENTOLIN HFA, PROAIR HFA Take 2 Puffs by inhalation every six (6) hours as needed for Wheezing or Shortness of Breath (or cough). chlorproMAZINE 25 mg tablet Commonly known as:  THORAZINE Take 1 Tab by mouth three (3) times daily. guaiFENesin-codeine 100-10 mg/5 mL solution Commonly known as:  ROBITUSSIN AC Take 5 mL by mouth three (3) times daily as needed for Cough. Max Daily Amount: 15 mL. HYDROcodone-acetaminophen 5-325 mg per tablet Commonly known as:  Ferne Arrow Take 1 Tab by mouth every six (6) hours as needed for Pain. Max Daily Amount: 4 Tabs. predniSONE 20 mg tablet Commonly known as:  Pat Skyler Take 2 Tabs by mouth daily for 4 days. Start on 2/3/18 With Breakfast  
  
  
  
  
We Performed the Following ADMIN INFLUENZA VIRUS VAC [ HCPCS] CBC W/O DIFF [79912 CPT(R)] HEPATITIS C AB [03824 CPT(R)] INFLUENZA VIRUS VAC QUAD,SPLIT,PRESV FREE SYRINGE IM [id number] CPT(R)] LIPID PANEL [06321 CPT(R)] METABOLIC PANEL, COMPREHENSIVE [25727 CPT(R)] PSA, DIAGNOSTIC (PROSTATE SPECIFIC AG) [id number] CPT(R)] REFERRAL TO GASTROENTEROLOGY [QMK41 Custom] TSH 3RD GENERATION [30992 CPT(R)] VITAMIN D, 25 HYDROXY [id number] CPT(R)] Referral Information Referral ID Referred By Referred To  
  
 [id number] Sera CHUNG [facility] [address] [facility] [phone] Visits Status Start Date End Date 1 New Request 2/6/18 2/6/19 If your referral has a status of pending review or denied, additional information will be sent to support the outcome of this decision. Introducing [facility]! Jennifer Blue introduces [employer] patient portal. Now you can access parts of your medical record, email your doctor's office, and request medication refills online. 1. In your internet browser, go to [email or web address] 2. Click on the First Time User? Click Here link in the Sign In box. You will see the New Member Sign Up page. 3. Enter your [address] Access Code exactly as it appears below. You will not need to use this code after youve completed the sign-up process. If you do not sign up before the expiration date, you must request a new code. · [address] Access Code: [email or web address] Expires: 5/3/2018  4:31 PM 
 
4. Enter the last four digits of your Social Security Number (xxxx) and Date of Birth (mm/dd/yyyy) as indicated and click Submit. You will be taken to the next sign-up page. 5. Create a [address] ID. This will be your [address] login ID and cannot be changed, so think of one that is secure and easy to remember. 6. Create a [address] password. You can change your password at any time. 7. Enter your Password Reset Question and Answer. This can be used at a later time if you forget your password. 8. Enter your e-mail address. You will receive e-mail notification when new information is available in [address] 9. Click Sign Up. You can now view and download portions of your medical record. 10. Click the Download Summary menu link to download a portable copy of your medical information. If you have questions, please visit the Frequently Asked Questions section of the [address] website. Remember, [address] is NOT to be used for urgent needs. For medical emergencies, dial 911. Now available from your iPhone and Android! Please provide this summary of care documentation to your next provider. Your primary care clinician is listed as Lisbeth Gandhi. If you have any questions after today's visit, please call [phone].

## 2018-02-06 NOTE — DISCHARGE INSTRUCTIONS
Hiccups: Care Instructions  Your Care Instructions    Hiccups occur when a spasm contracts the diaphragm, a large sheet of muscle that separates the chest cavity from the abdominal cavity. The spasm causes an intake of breath that is suddenly stopped by the closure of the vocal cords (glottis). This closure causes the \"hiccup\" sound. A very full stomach can cause hiccups that go away on their own. A full stomach can be caused by things like eating too much food too quickly or swallowing too much air. Most hiccups go away on their own within a few minutes to a few hours and do not require any treatment. Hiccups that last longer than 48 hours are called persistent hiccups. Hiccups that last longer than a month are called intractable hiccups. Both persistent and intractable hiccups may be a sign of a more serious health problem. Follow-up care is a key part of your treatment and safety. Be sure to make and go to all appointments, and call your doctor if you are having problems. It's also a good idea to know your test results and keep a list of the medicines you take. How can you care for yourself at home? · Try these safe and easy home remedies if your hiccups are making you uncomfortable. ¨ Eat a teaspoon of sugar or honey. ¨ Hold your breath and count slowly to 10. ¨ Quickly drink a glass of cold water. · If your doctor prescribed medicine, take it as directed. Call your doctor if you think you are having a problem with your medicine. To help prevent hiccups  · Take steps to avoid swallowing air:  ¨ Eat slowly. Avoid gulping food or beverages. ¨ Chew your food thoroughly before you swallow. ¨ Avoid drinking through a straw. ¨ Avoid chewing gum or eating hard candy. ¨ Do not smoke or use other tobacco products. ¨ If you wear dentures, check with a dentist to make sure they fit properly. · Do not eat large meals. · Do not drink alcohol.   · Avoid sudden changes in stomach temperature, such as drinking a hot beverage and then a cold beverage. · Avoid emotional stress or excitement. When should you call for help? Call your doctor now or seek immediate medical care if:  ? · You have trouble swallowing and are unable to swallow food or fluids. ? · You have hiccups for more than 2 days. ? · You have new symptoms, such as belly pain, constipation, diarrhea, heartburn, or vomiting. ? Watch closely for changes in your health, and be sure to contact your doctor if:  ? · You have trouble swallowing but are able to swallow food and fluids. ? · Hiccups occur often and get in the way of your activities. ? · You think medicine may be causing your symptoms. ? · You do not get better as expected. Where can you learn more? Go to http://tr-efraín.info/. Enter H632 in the search box to learn more about \"Hiccups: Care Instructions. \"  Current as of: March 20, 2017  Content Version: 11.4  © 7410-3952 [employer]. Care instructions adapted under license by [employer] (which disclaims liability or warranty for this information). If you have questions about a medical condition or this instruction, always ask your healthcare professional. [address] any warranty or liability for your use of this information. We hope that we have addressed all of your medical concerns. The examination and treatment you received in the Emergency Department were for an emergent problem and were not intended as complete care. It is important that you follow up with your healthcare provider(s) for ongoing care. If your symptoms worsen or do not improve as expected, and you are unable to reach your usual health care provider(s), you should return to the Emergency Department. Today's healthcare is undergoing tremendous change, and patient satisfaction surveys are one of the many tools to assess the quality of medical care.   You may receive a survey from the [employer] organization regarding your experience in the Emergency Department. I hope that your experience has been completely positive, particularly the medical care that I provided. As such, please participate in the survey; anything less than excellent does not meet my expectations or intentions. [address] and [facility] participate in nationally recognized quality of care measures. If your blood pressure is greater than 120/80, as reported below, we urge that you seek medical care to address the potential of high blood pressure, commonly known as hypertension. Hypertension can be hereditary or can be caused by certain medical conditions, pain, stress, or \"white coat syndrome. \"       Please make an appointment with your health care provider(s) for follow up of your Emergency Department visit. VITALS:   Patient Vitals for the past 8 hrs:   Temp Pulse Resp BP SpO2   02/06/18 0332 99.7 °F (37.6 °C) 69 16 135/79 98 %   02/06/18 0028 99.4 °F (37.4 °C) 61 16 (!) 177/96 97 %          Thank you for allowing us to provide you with medical care today. We realize that you have many choices for your emergency care needs. Please choose us in the future for any continued health care needs.       Pako Hough MD    [facility], [facility].   Office: [phone]            Recent Results (from the past 24 hour(s))   CBC WITH AUTOMATED DIFF    Collection Time: 02/06/18  2:52 AM   Result Value Ref Range    WBC 5.0 4.1 - 11.1 K/uL    RBC 4.92 4.10 - 5.70 M/uL    HGB 15.4 12.1 - 17.0 g/dL    HCT 45.1 36.6 - 50.3 %    MCV 91.7 80.0 - 99.0 FL    MCH 31.3 26.0 - 34.0 PG    MCHC 34.1 30.0 - 36.5 g/dL    RDW 13.3 11.5 - 14.5 %    PLATELET 846 (L) 835 - 400 K/uL    MPV 11.0 8.9 - 12.9 FL    NRBC 0.0 0  WBC    ABSOLUTE NRBC 0.00 0.00 - 0.01 K/uL    NEUTROPHILS 38 32 - 75 %    LYMPHOCYTES 45 12 - 49 %    MONOCYTES 17 (H) 5 - 13 % EOSINOPHILS 0 0 - 7 %    BASOPHILS 0 0 - 1 %    IMMATURE GRANULOCYTES 0 0.0 - 0.5 %    ABS. NEUTROPHILS 1.9 1.8 - 8.0 K/UL    ABS. LYMPHOCYTES 2.2 0.8 - 3.5 K/UL    ABS. MONOCYTES 0.9 0.0 - 1.0 K/UL    ABS. EOSINOPHILS 0.0 0.0 - 0.4 K/UL    ABS. BASOPHILS 0.0 0.0 - 0.1 K/UL    ABS. IMM. GRANS. 0.0 0.00 - 0.04 K/UL    DF AUTOMATED     METABOLIC PANEL, COMPREHENSIVE    Collection Time: 02/06/18  2:52 AM   Result Value Ref Range    Sodium 135 (L) 136 - 145 mmol/L    Potassium 4.0 3.5 - 5.1 mmol/L    Chloride 100 97 - 108 mmol/L    CO2 29 21 - 32 mmol/L    Anion gap 6 5 - 15 mmol/L    Glucose 114 (H) 65 - 100 mg/dL    BUN 17 6 - 20 MG/DL    Creatinine 1.24 0.70 - 1.30 MG/DL    BUN/Creatinine ratio 14 12 - 20      GFR est AA >60 >60 ml/min/1.73m2    GFR est non-AA 59 (L) >60 ml/min/1.73m2    Calcium 8.3 (L) 8.5 - 10.1 MG/DL    Bilirubin, total 0.3 0.2 - 1.0 MG/DL    ALT (SGPT) 47 12 - 78 U/L    AST (SGOT) 34 15 - 37 U/L    Alk. phosphatase 53 45 - 117 U/L    Protein, total 8.3 (H) 6.4 - 8.2 g/dL    Albumin 3.4 (L) 3.5 - 5.0 g/dL    Globulin 4.9 (H) 2.0 - 4.0 g/dL    A-G Ratio 0.7 (L) 1.1 - 2.2         No results found.

## 2018-02-06 NOTE — LETTER
2/8/2018 1:05 PM 
 
Mr. Ceferino Yao [address] [facility] [address] Dear Ceferino Coronado: 
 
Please find your most recent results below. Resulted Orders CBC W/O DIFF Result Value Ref Range WBC 4.2 3.4 - 10.8 x10E3/uL  
 RBC 5.13 4.14 - 5.80 x10E6/uL HGB 15.9 13.0 - 17.7 g/dL HCT 45.6 37.5 - 51.0 % MCV 89 79 - 97 fL  
 MCH 31.0 26.6 - 33.0 pg  
 MCHC 34.9 31.5 - 35.7 g/dL  
 RDW 14.5 12.3 - 15.4 % PLATELET 431 (L) 504 - 379 x10E3/uL Narrative Performed at:  [address]  [id number] : Magy Bello MD, Phone:  [phone] METABOLIC PANEL, COMPREHENSIVE Result Value Ref Range Glucose 117 (H) 65 - 99 mg/dL BUN 15 8 - 27 mg/dL Creatinine 1.14 0.76 - 1.27 mg/dL GFR est non-AA 69 >59 mL/min/1.73 GFR est AA 80 >59 mL/min/1.73  
 BUN/Creatinine ratio 13 10 - 24 Sodium 137 134 - 144 mmol/L Potassium 4.1 3.5 - 5.2 mmol/L Chloride 96 96 - 106 mmol/L  
 CO2 25 18 - 29 mmol/L Calcium 8.8 8.6 - 10.2 mg/dL Protein, total 7.9 6.0 - 8.5 g/dL Albumin 4.4 3.6 - 4.8 g/dL GLOBULIN, TOTAL 3.5 1.5 - 4.5 g/dL A-G Ratio 1.3 1.2 - 2.2 Bilirubin, total 0.4 0.0 - 1.2 mg/dL Alk. phosphatase 48 39 - 117 IU/L  
 AST (SGOT) 35 0 - 40 IU/L  
 ALT (SGPT) 34 0 - 44 IU/L Narrative Performed at:  [address]  [id number] : Magy Bello MD, Phone:  [phone] LIPID PANEL Result Value Ref Range Cholesterol, total 140 100 - 199 mg/dL Triglyceride 111 0 - 149 mg/dL HDL Cholesterol 35 (L) >39 mg/dL VLDL, calculated 22 5 - 40 mg/dL LDL, calculated 83 0 - 99 mg/dL Narrative Performed at:  [address]  [id number] : Magy Bello MD, Phone:  [phone] VITAMIN D, 25 HYDROXY Result Value Ref Range VITAMIN D, 25-HYDROXY 8.6 (L) 30.0 - 100.0 ng/mL Comment:  
   Vitamin D deficiency has been defined by the [address] practice guideline as a 
level of serum 25-OH vitamin D less than 20 ng/mL (1,2). The Endocrine Society went on to further define vitamin D 
insufficiency as a level between 21 and 29 ng/mL (2). 1. IOM ([place] of Medicine). 2010. Dietary reference 
   intakes for calcium and D. [facility]: The 
   [place]. 2. Юлия MF, Eladia NC, Marino HEARN, et al. 
   Evaluation, treatment, and prevention of vitamin D 
   deficiency: an Endocrine Society clinical practice 
   guideline. JCEM. 2011 Jul; 96(7):1911-30. Narrative Performed at:  [address]  [id number] : Arlice Meckel MD, Phone:  [phone] PSA, DIAGNOSTIC (PROSTATE SPECIFIC AG) Result Value Ref Range Prostate Specific Ag 4.4 (H) 0.0 - 4.0 ng/mL Comment:  
   Roche ECLIA methodology. According to the American Urological Association, Serum PSA should 
decrease and remain at undetectable levels after radical 
prostatectomy. The AUA defines biochemical recurrence as an initial 
PSA value 0.2 ng/mL or greater followed by a subsequent confirmatory PSA value 0.2 ng/mL or greater. Values obtained with different assay methods or kits cannot be used 
interchangeably. Results cannot be interpreted as absolute evidence 
of the presence or absence of malignant disease. Narrative Performed at:  [address]  [id number] : Arlice Meckel MD, Phone:  [phone] HEPATITIS C AB Result Value Ref Range Hep C Virus Ab 0.4 0.0 - 0.9 s/co ratio Comment:  
                                     Negative:     < 0.8 Indeterminate: 0.8 - 0.9 Positive:     > 0.9 The CDC recommends that a positive HCV antibody result be followed up with a HCV Nucleic Acid Amplification 
 test (529251). Narrative Performed at:  [address]  [id number] : Angie Handley MD, Phone:  [phone] TSH 3RD GENERATION Result Value Ref Range TSH 1.850 0.450 - 4.500 uIU/mL Narrative Performed at:  [address]  [id number] : Angie Handley MD, Phone:  [phone] CVD REPORT Result Value Ref Range INTERPRETATION Note Comment:  
   Supplemental report is available. Narrative Performed at:  [address] [place] [address]  [id number] : Ariane Milian PhD, Phone:  [phone] RECOMMENDATIONS: 
 
1.  Vitamin D 50,000 units weekly x 12 weeks.  After completion of 12 weeks, recommend OTC Vitamin D3 1000 units daily. 2.  PSA elevated.  Follow-up with urology. Shiloh Gabrielrobin placed in [facility]. 3. Other labs stable. Please call me if you have any questions: [phone] Sincerely, Tanvi Payne NP

## 2018-02-07 LAB
25(OH)D3+25(OH)D2 SERPL-MCNC: 8.6 NG/ML (ref 30–100)
ALBUMIN SERPL-MCNC: 4.4 G/DL (ref 3.6–4.8)
ALBUMIN/GLOB SERPL: 1.3 {RATIO} (ref 1.2–2.2)
ALP SERPL-CCNC: 48 IU/L (ref 39–117)
ALT SERPL-CCNC: 34 IU/L (ref 0–44)
AST SERPL-CCNC: 35 IU/L (ref 0–40)
BILIRUB SERPL-MCNC: 0.4 MG/DL (ref 0–1.2)
BUN SERPL-MCNC: 15 MG/DL (ref 8–27)
BUN/CREAT SERPL: 13 (ref 10–24)
CALCIUM SERPL-MCNC: 8.8 MG/DL (ref 8.6–10.2)
CHLORIDE SERPL-SCNC: 96 MMOL/L (ref 96–106)
CHOLEST SERPL-MCNC: 140 MG/DL (ref 100–199)
CO2 SERPL-SCNC: 25 MMOL/L (ref 18–29)
CREAT SERPL-MCNC: 1.14 MG/DL (ref 0.76–1.27)
ERYTHROCYTE [DISTWIDTH] IN BLOOD BY AUTOMATED COUNT: 14.5 % (ref 12.3–15.4)
GFR SERPLBLD CREATININE-BSD FMLA CKD-EPI: 69 ML/MIN/1.73
GFR SERPLBLD CREATININE-BSD FMLA CKD-EPI: 80 ML/MIN/1.73
GLOBULIN SER CALC-MCNC: 3.5 G/DL (ref 1.5–4.5)
GLUCOSE SERPL-MCNC: 117 MG/DL (ref 65–99)
HCT VFR BLD AUTO: 45.6 % (ref 37.5–51)
HCV AB S/CO SERPL IA: 0.4 S/CO RATIO (ref 0–0.9)
HDLC SERPL-MCNC: 35 MG/DL
HGB BLD-MCNC: 15.9 G/DL (ref 13–17.7)
INTERPRETATION, 910389: NORMAL
LDLC SERPL CALC-MCNC: 83 MG/DL (ref 0–99)
MCH RBC QN AUTO: 31 PG (ref 26.6–33)
MCHC RBC AUTO-ENTMCNC: 34.9 G/DL (ref 31.5–35.7)
MCV RBC AUTO: 89 FL (ref 79–97)
PLATELET # BLD AUTO: 144 X10E3/UL (ref 150–379)
POTASSIUM SERPL-SCNC: 4.1 MMOL/L (ref 3.5–5.2)
PROT SERPL-MCNC: 7.9 G/DL (ref 6–8.5)
PSA SERPL-MCNC: 4.4 NG/ML (ref 0–4)
RBC # BLD AUTO: 5.13 X10E6/UL (ref 4.14–5.8)
SODIUM SERPL-SCNC: 137 MMOL/L (ref 134–144)
TRIGL SERPL-MCNC: 111 MG/DL (ref 0–149)
TSH SERPL DL<=0.005 MIU/L-ACNC: 1.85 UIU/ML (ref 0.45–4.5)
VLDLC SERPL CALC-MCNC: 22 MG/DL (ref 5–40)
WBC # BLD AUTO: 4.2 X10E3/UL (ref 3.4–10.8)

## 2018-02-08 DIAGNOSIS — R97.20 ELEVATED PSA: Primary | ICD-10-CM

## 2018-02-08 RX ORDER — ERGOCALCIFEROL 1.25 MG/1
50000 CAPSULE ORAL
Qty: 12 CAP | Refills: 0 | Status: SHIPPED | OUTPATIENT
Start: 2018-02-08 | End: 2019-04-08 | Stop reason: SDUPTHER

## 2018-02-08 NOTE — PROGRESS NOTES
1.  Vitamin D 50,000 units weekly x 12 weeks. After completion of 12 weeks, recommend OTC Vitamin D3 1000 units daily. 2.  PSA elevated. Follow-up with urology. Referral placed in [facility]. Other labs stable.

## 2018-08-01 ENCOUNTER — HOSPITAL ENCOUNTER (EMERGENCY)
Age: 63
Discharge: HOME | End: 2018-08-01
Attending: EMERGENCY MEDICINE
Payer: COMMERCIAL

## 2018-08-01 VITALS
OXYGEN SATURATION: 98 % | TEMPERATURE: 98.3 F | SYSTOLIC BLOOD PRESSURE: 124 MMHG | HEIGHT: 69 IN | BODY MASS INDEX: 34.96 KG/M2 | RESPIRATION RATE: 16 BRPM | HEART RATE: 58 BPM | DIASTOLIC BLOOD PRESSURE: 74 MMHG | WEIGHT: 236 LBS

## 2018-08-01 DIAGNOSIS — L03.012 PARONYCHIA OF FINGER OF LEFT HAND: Primary | ICD-10-CM

## 2018-08-01 PROCEDURE — 74011250636 HC RX REV CODE- 250/636: Performed by: PHYSICIAN ASSISTANT

## 2018-08-01 PROCEDURE — 75810000451 HC DRAIN ABSC FINGER SIMPLE

## 2018-08-01 PROCEDURE — 99282 EMERGENCY DEPT VISIT SF MDM: CPT

## 2018-08-01 RX ORDER — LIDOCAINE HYDROCHLORIDE 10 MG/ML
10 INJECTION, SOLUTION EPIDURAL; INFILTRATION; INTRACAUDAL; PERINEURAL ONCE
Status: COMPLETED | OUTPATIENT
Start: 2018-08-01 | End: 2018-08-01

## 2018-08-01 RX ORDER — SULFAMETHOXAZOLE AND TRIMETHOPRIM 800; 160 MG/1; MG/1
1 TABLET ORAL 2 TIMES DAILY
Qty: 14 TAB | Refills: 0 | Status: SHIPPED | OUTPATIENT
Start: 2018-08-01 | End: 2018-08-08

## 2018-08-01 RX ADMIN — LIDOCAINE HYDROCHLORIDE 10 ML: 10 INJECTION, SOLUTION EPIDURAL; INFILTRATION; INTRACAUDAL; PERINEURAL at 18:55

## 2018-08-01 NOTE — LETTER
José [address]. [address] 
[address] [facility] 7 [address] 
[phone] Work/School Note Date: 2018 To Whom It May concern: 
 
Roselle Siemens was seen and treated today in the emergency room by the following provider(s): 
Attending Provider: Earlene Hill DO Physician Assistant: Johanny Estevez PA-C. Roselle Siemens {Return to Vonda Sequin Sincerely, Emely

## 2018-08-01 NOTE — DISCHARGE INSTRUCTIONS
Paronychia: Care Instructions  Your Care Instructions  Paronychia (say \"sivt-ba-GR-elda-uh\") is an infection of the skin around a fingernail or toenail. It happens when germs enter through a break in the skin. The doctor may have made a small cut in the infected area to drain the pus. Most cases of paronychia improve in a few days. But watch your symptoms and follow your doctor's advice. Though rare, a mild case can turn into something more serious and infect your entire finger or toe. Also, it is possible for an infection to return. Follow-up care is a key part of your treatment and safety. Be sure to make and go to all appointments, and call your doctor if you are having problems. It's also a good idea to know your test results and keep a list of the medicines you take. How can you care for yourself at home? · If your doctor told you how to care for your infected nail, follow the doctor's instructions. If you did not get instructions, follow this general advice:  ¨ Wash the area with clean water 2 times a day. Don't use hydrogen peroxide or alcohol, which can slow healing. ¨ You may cover the area with a thin layer of petroleum jelly, such as Vaseline, and a nonstick bandage. ¨ Apply more petroleum jelly and replace the bandage as needed. · If your doctor prescribed antibiotics, take them as directed. Do not stop taking them just because you feel better. You need to take the full course of antibiotics. · Take an over-the-counter pain medicine, such as acetaminophen (Tylenol), ibuprofen (Advil, Motrin), or naproxen (Aleve). Read and follow all instructions on the label. · Do not take two or more pain medicines at the same time unless the doctor told you to. Many pain medicines have acetaminophen, which is Tylenol. Too much acetaminophen (Tylenol) can be harmful. · Prop up the toe or finger so that it is higher than the level of your heart. This will help with pain and swelling. · Apply heat.  Put a warm water bottle, heating pad set on low, or warm cloth on your finger or toe. Do not go to sleep with a heating pad on your skin. · Soak the area in warm water twice a day for 15 minutes each time. After soaking, dry the area well and apply a thin layer of petroleum jelly, such as Vaseline. Put on a new bandage. When should you call for help? Call your doctor now or seek immediate medical care if:    · You have signs of new or worsening infection, such as:  ¨ Increased pain, swelling, warmth, or redness. ¨ Red streaks leading from the infected skin. ¨ Pus draining from the area. ¨ A fever.    Watch closely for changes in your health, and be sure to contact your doctor if:    · You do not get better as expected. Where can you learn more? Go to http://tr-efraín.info/. Enter C435 in the search box to learn more about \"Paronychia: Care Instructions. \"  Current as of: October 5, 2017  Content Version: 11.7  © 2246-6575 [employer]. Care instructions adapted under license by [employer] (which disclaims liability or warranty for this information). If you have questions about a medical condition or this instruction, always ask your healthcare professional. [address] any warranty or liability for your use of this information. We hope that we have addressed all of your medical concerns. The examination and treatment you received in the Emergency Department were for an emergent problem and were not intended as complete care. It is important that you follow up with your healthcare provider(s) for ongoing care. If your symptoms worsen or do not improve as expected, and you are unable to reach your usual health care provider(s), you should return to the Emergency Department. Today's healthcare is undergoing tremendous change, and patient satisfaction surveys are one of the many tools to assess the quality of medical care.   You may receive a survey from the [employer] regarding your experience in the Emergency Department. I hope that your experience has been completely positive, particularly the medical care that I provided. As such, please participate in the survey; anything less than excellent does not meet my expectations or intentions. [facility] participate in nationally recognized quality of care measures. If your blood pressure is greater than 120/80, as reported below, we urge that you seek medical care to address the potential of high blood pressure, commonly known as hypertension. Hypertension can be hereditary or can be caused by certain medical conditions, pain, stress, or \"white coat syndrome. \"       Please make an appointment with your health care provider(s) for follow up of your Emergency Department visit. VITALS:   Patient Vitals for the past 8 hrs:   Temp Pulse Resp BP SpO2   08/01/18 1742 98.1 °F (36.7 °C) 68 16 124/74 100 %          Thank you for allowing us to provide you with medical care today. We realize that you have many choices for your emergency care needs. Please choose us in the future for any continued health care needs. Wade Castro, [facility] Kiel Mazariegos: [phone]            No results found for this or any previous visit (from the past 24 hour(s)). No results found.

## 2018-08-01 NOTE — ED PROVIDER NOTES
HPI Comments: 65 y.o. male with no significant past medical history who presents from Home with chief complaint of Paronychia on Left 3rd Digit. Patient states that he has had moderate swelling in his left 3rd digit. 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 He mentions that he previously had paronychia earlier this year which he had drained. Patient denies fever, chills, nausea, diarrhea, vomiting, and headache. There are no other acute medical concerns at this time. Social hx: Negative Tobacco Usage; Negative Alcohol Usage; Negative Drug Usage PCP: Luz Hernandez NP Note written by Jag Garcia, as dictated by Stanislav Montenegro PA-C 6:23 PM 
 
The history is provided by the patient. No  was used. History reviewed. No pertinent past medical history. History reviewed. No pertinent surgical history. Family History:  
Problem Relation Age of Onset  Diabetes Mother  Diabetes Sister  Hypertension Sister  Diabetes Brother  Hypertension Brother Social History Social History  Marital status:  Spouse name: N/A  
 Number of children: N/A  
 Years of education: N/A Occupational History  Not on file. Social History Main Topics  Smoking status: Never Smoker  Smokeless tobacco: Never Used  Alcohol use No  
 Drug use: No  
 Sexual activity: Not on file Other Topics Concern  Not on file Social History Narrative ALLERGIES: Review of patient's allergies indicates no known allergies. Review of Systems Constitutional: Negative for chills, diaphoresis and fever. HENT: Negative for congestion, postnasal drip, rhinorrhea and sore throat. Eyes: Negative for photophobia, discharge, redness and visual disturbance. Respiratory: Negative for cough, chest tightness, shortness of breath and wheezing.    
Cardiovascular: Negative for chest pain, palpitations and leg swelling. Gastrointestinal: Negative for abdominal distention, abdominal pain, blood in stool, constipation, diarrhea, nausea and vomiting. Genitourinary: Negative for difficulty urinating, dysuria, frequency, hematuria and urgency. Musculoskeletal: Positive for joint swelling (Left 3rd Digit). Negative for arthralgias, back pain and myalgias. Skin: Negative for color change and rash. Neurological: Negative for dizziness, speech difficulty, weakness, light-headedness, numbness and headaches. Psychiatric/Behavioral: Negative for confusion. The patient is not nervous/anxious. All other systems reviewed and are negative. Vitals:  
 08/01/18 1742 08/01/18 1914 BP: 124/74 Pulse: 68 (!) 58 Resp: 16 Temp: 98.1 °F (36.7 °C) 98.3 °F (36.8 °C) SpO2: 100% 98% Weight: 107 kg (236 lb) Height: 5' 9\" (1.753 m) Physical Exam  
Constitutional: He is oriented to person, place, and time. He appears well-developed. No distress. HENT:  
Head: Normocephalic and atraumatic. Right Ear: External ear normal.  
Left Ear: External ear normal.  
Nose: Nose normal.  
Mouth/Throat: Oropharynx is clear and moist.  
Eyes: Conjunctivae and EOM are normal. Pupils are equal, round, and reactive to light. No scleral icterus. Neck: Normal range of motion. Neck supple. No JVD present. No tracheal deviation present. No thyromegaly present. Cardiovascular: Normal rate, regular rhythm and normal heart sounds. Exam reveals no gallop and no friction rub. No murmur heard. Pulmonary/Chest: Effort normal and breath sounds normal. No respiratory distress. He has no wheezes. He has no rales. He exhibits no tenderness. Musculoskeletal:  
     Left hand: Tenderness: To Palpation. Paronychia in Distal Left 3rd Digit. Tenderness to Palpation. Neurovascularly intact. Full ROM. Lymphadenopathy:  
  He has no cervical adenopathy.   
Neurological: He is alert and oriented to person, place, and time. He has normal strength. He displays no atrophy and no tremor. No cranial nerve deficit. He exhibits normal muscle tone. Coordination and gait normal.  
Skin: Skin is warm and dry. No rash noted. He is not diaphoretic. Psychiatric: He has a normal mood and affect. His behavior is normal. Judgment and thought content normal.  
Nursing note and vitals reviewed. Note written by Jag Santos, as dictated by Ashley Brown PA-C 6:23 PM 
 
MDM Number of Diagnoses or Management Options Paronychia of finger of left hand:  
 
 
 
ED Course I&D Abcess Simple Date/Time: 8/1/2018 9:07 PM 
Performed by: Samina Landin Authorized by: Samina Landin Consent:  
  Consent obtained:  Verbal 
  Consent given by:  Patient Risks discussed:  Incomplete drainage and infection Alternatives discussed:  No treatment Location:  
  Indications for incision and drainage: Paronychia Location: Distal left third digit. Pre-procedure details:  
  Skin preparation:  Betadine Anesthesia (see MAR for exact dosages): Anesthesia method:  Local infiltration Local anesthetic:  Lidocaine 1% w/o epi Procedure type:  
  Complexity:  Simple Procedure details:  
  Needle aspiration: no Incision types:  Single straight Incision depth:  Subcutaneous Scalpel blade:  11 Drainage:  Purulent Drainage amount: Moderate Wound treatment:  Wound left open Packing materials:  None Post-procedure details:  
  Patient tolerance of procedure: Tolerated well, no immediate complications

## 2018-08-01 NOTE — ED NOTES
Discharge instructions given by patient by nurse. Pt has been given counseling on medication use and verbalizes understanding. Pt ambulated off of unit in no signs of distress. Work note provided.

## 2018-08-01 NOTE — LETTER
[address] 
[address] [facility] [place] [facility] [address] 
[phone] Work/School Note Date: 8/1/2018 To Whom It May concern: 
 
Jono Meredith was seen and treated today in the emergency room by the following provider(s): 
Attending Provider: Kenji Saleh DO Physician Assistant: Shea Whyte PA-C. Jono Valeroak {Return to work 8/4/2018} Sincerely, Alesia Berger

## 2018-08-01 NOTE — ED NOTES
5:43 PM 
I have evaluated the patient as the Provider in Triage. I have reviewed His vital signs and the triage nurse assessment. I have talked with the patient and any available family and advised that I am the provider in triage and have ordered the appropriate study to initiate their work up based on the clinical presentation during my assessment. I have advised that the patient will be accommodated in the Main ED as soon as possible. I have also requested to contact the triage nurse or myself immediately if the patient experiences any changes in their condition during this brief waiting period. Babs Contreras PA-C Paronychia left middle finger

## 2018-08-08 ENCOUNTER — HOSPITAL ENCOUNTER (EMERGENCY)
Age: 63
Discharge: HOME | End: 2018-08-08
Attending: EMERGENCY MEDICINE
Payer: COMMERCIAL

## 2018-08-08 VITALS
TEMPERATURE: 97.6 F | SYSTOLIC BLOOD PRESSURE: 159 MMHG | RESPIRATION RATE: 16 BRPM | OXYGEN SATURATION: 97 % | DIASTOLIC BLOOD PRESSURE: 90 MMHG | HEART RATE: 79 BPM | BODY MASS INDEX: 33.24 KG/M2 | WEIGHT: 224.43 LBS | HEIGHT: 69 IN

## 2018-08-08 DIAGNOSIS — B35.6 TINEA CRURIS: Primary | ICD-10-CM

## 2018-08-08 DIAGNOSIS — K62.89 RECTAL PAIN: ICD-10-CM

## 2018-08-08 PROCEDURE — 87255 GENET VIRUS ISOLATE HSV: CPT | Performed by: EMERGENCY MEDICINE

## 2018-08-08 PROCEDURE — 99282 EMERGENCY DEPT VISIT SF MDM: CPT

## 2018-08-08 RX ORDER — CHLORPHENIRAMINE MALEATE 4 MG
TABLET ORAL 2 TIMES DAILY
Qty: 15 G | Refills: 0 | Status: SHIPPED | OUTPATIENT
Start: 2018-08-08 | End: 2019-01-14 | Stop reason: ALTCHOICE

## 2018-08-08 RX ORDER — CHLORPHENIRAMINE MALEATE 4 MG
TABLET ORAL 2 TIMES DAILY
Qty: 15 G | Refills: 0 | Status: SHIPPED | OUTPATIENT
Start: 2018-08-08 | End: 2018-08-08

## 2018-08-08 RX ORDER — HYDROCODONE BITARTRATE AND ACETAMINOPHEN 5; 325 MG/1; MG/1
1 TABLET ORAL
Qty: 8 TAB | Refills: 0 | Status: SHIPPED | OUTPATIENT
Start: 2018-08-08 | End: 2019-01-10

## 2018-08-08 RX ORDER — ACYCLOVIR 400 MG/1
400 TABLET ORAL 3 TIMES DAILY
Qty: 21 TAB | Refills: 0 | Status: SHIPPED | OUTPATIENT
Start: 2018-08-08 | End: 2018-08-15

## 2018-08-08 NOTE — DISCHARGE INSTRUCTIONS
Jock Itch: Care Instructions  Your Care Instructions  Jock itch is a fungal infection of the groin. The fungus that causes jock itch lives on your skin. It often affects male athletes, but anyone can get jock itch. You may get an itchy rash on your inner thighs and rear end (buttocks). It spreads and starts to itch when you sweat or are in steamy showers or locker rooms. Jock itch should end soon if you keep your skin dry after you clean it. You can treat jock itch at home with antifungal creams and powders that you can buy without a prescription. Follow-up care is a key part of your treatment and safety. Be sure to make and go to all appointments, and call your doctor if you are having problems. It's also a good idea to know your test results and keep a list of the medicines you take. How can you care for yourself at home? · Wash the rash with soap and water. · Wet a soft cloth with cool water alone or mixed with baking soda or essential oils such as lavender or julio. Put the cool compress on the skin to relieve itching. · If you have large areas of blisters or sores, use compresses such as those made with Burow's solution (available without a prescription) to soothe and dry out the blisters. · Spread antifungal cream or powder over, and beyond the edge of, the rash. Follow the directions on the package. · If your doctor prescribed medicine, take it exactly as directed. Call your doctor if you have any problems with your medicine. · Try not to scratch the rash. · Shower or bathe daily and after you exercise. · Keep your skin dry as much as possible to allow it to heal.  · Until your jock itch is cured, wear loose-fitting cotton clothing. Avoid tight underwear, pants, and panty hose. · Wash your supporters and shorts after every wearing. · Do not share clothing, sports equipment, towels, or sheets to avoid spreading the fungi to other people.   To prevent jock itch  · Put on socks before you put on underwear if you have athlete's foot. This action helps prevent the fungus on your feet from spreading to your groin. · Wash your workout clothes, underwear, socks, and towels after each use. · Keep your groin, inner thighs, and buttocks clean and dry, especially after you exercise and shower. · Use a powder on your groin, inner thighs, and buttocks to help keep these areas dry. · Do not borrow or lend clothing, sports equipment, towels, or sheets. · Wear slippers or sandals in locker rooms, showers, and public bathing areas. When should you call for help? Call your doctor now or seek immediate medical care if:    · You have signs of infection, such as:  ¨ Increased pain, swelling, warmth, or redness. ¨ Red streaks leading from the rash. ¨ Pus draining from the rash. ¨ A fever.    Watch closely for changes in your health, and be sure to contact your doctor if:    · You do not get better as expected. Where can you learn more? Go to http://tr-efraín.info/. Enter G303 in the search box to learn more about \"Jock Itch: Care Instructions. \"  Current as of: October 5, 2017  Content Version: 11.7  © 2268-6599 [employer]. Care instructions adapted under license by [employer] (which disclaims liability or warranty for this information). If you have questions about a medical condition or this instruction, always ask your healthcare professional. [place] [address] any warranty or liability for your use of this information.

## 2018-08-08 NOTE — LETTER
[address] 
[address] [place], [place] [address] [place] [facility] [address] 
[phone] Work/School Note Date: 8/8/2018 To Whom It May concern: 
 
Beverly Craig was seen and treated today in the emergency room by the following provider(s): 
Attending Provider: Carlos Beckford MD 
Physician Assistant: Barbara Cabrera PA-C. Beverly Craig may return to work on 8/11/2018. Sincerely, 
 
 
 
Jovon Cedeño.  Lian Kelly PA-C

## 2018-08-08 NOTE — ED PROVIDER NOTES
HPI Comments: 50-year-old male presents emergency room for evaluation of rash. Patient states it is located around the buttocks near his rectum and in between his legs. Onset was over the weekend. Patient has used Goldbaum powder which has helped. Pain around the rectum still persists. No history of herpes. No abdominal pain, nausea or vomiting. No blood in stool. No difficulty with bowels. No testicular pain. No dizziness or lightheadedness. No blood in the urine. No known chest pain event except that patient does a lot of walking and sweating. There are no other acute medical concerns at this time. Old Chart Review: Pt was seen here 7 days ago for a paronychia on his L third digit, had it drained and was discharged home on Bactrim. Social hx: Nonsmoker; No EtOH use  PCP: Marie Guerrero NP    The history is provided by the patient. No  was used. History reviewed. No pertinent past medical history. History reviewed. No pertinent surgical history. Family History:   Problem Relation Age of Onset    Diabetes Mother     Diabetes Sister     Hypertension Sister     Diabetes Brother     Hypertension Brother        Social History     Social History    Marital status:      Spouse name: N/A    Number of children: N/A    Years of education: N/A     Occupational History    Not on file. Social History Main Topics    Smoking status: Never Smoker    Smokeless tobacco: Never Used    Alcohol use No    Drug use: No    Sexual activity: Not on file     Other Topics Concern    Not on file     Social History Narrative         ALLERGIES: Review of patient's allergies indicates no known allergies. Review of Systems   Constitutional: Negative for chills and fever. Gastrointestinal: Negative for abdominal pain, diarrhea, nausea, rectal pain and vomiting. Musculoskeletal: Negative for back pain and neck pain. Skin: Positive for color change and wound. Negative for rash. Neurological: Negative for dizziness and headaches. Vitals:    08/08/18 1725   BP: 159/90   Pulse: 79   Resp: 16   Temp: 97.6 °F (36.4 °C)   SpO2: 97%   Weight: 101.8 kg (224 lb 6.9 oz)   Height: 5' 9\" (1.753 m)            Physical Exam   Constitutional: He is oriented to person, place, and time. He appears well-developed and well-nourished. No distress. HENT:   Head: Normocephalic and atraumatic. Right Ear: External ear normal.   Left Ear: External ear normal.   Eyes: Conjunctivae and EOM are normal. Pupils are equal, round, and reactive to light. Neck: Normal range of motion. Neck supple. Cardiovascular: Normal rate, regular rhythm and normal heart sounds. Pulmonary/Chest: Effort normal and breath sounds normal. No respiratory distress. He has no wheezes. Abdominal: Soft. Normal appearance and bowel sounds are normal. He exhibits no shifting dullness, no distension, no pulsatile liver, no abdominal bruit, no pulsatile midline mass and no mass. There is no hepatosplenomegaly, splenomegaly or hepatomegaly. There is no tenderness. There is no rigidity, no rebound, no guarding, no CVA tenderness, no tenderness at McBurney's point and negative Alves's sign. Genitourinary:   Genitourinary Comments: Rectal: break in skin. No ulceration. No vesicles. No drainage  No rectal involvment    Upper thighs to buttocks. Dry slightly erythmatous patches of demarcated skin   Musculoskeletal: Normal range of motion. He exhibits no edema or tenderness. Neurological: He is alert and oriented to person, place, and time. He has normal reflexes. No cranial nerve deficit. Coordination normal.   Skin: Skin is warm and dry. No rash noted. No erythema. Psychiatric: He has a normal mood and affect. His behavior is normal. Judgment and thought content normal.   Nursing note and vitals reviewed.        MDM  Number of Diagnoses or Management Options  Rectal pain:   Tinea cruris:   Diagnosis management comments: 50-year-old male presenting for rash. Located around the buttocks. Break in skin along rectum. Demarcated patches on thighs. No pus or discharge. No drainage. No cellulitis. Nontoxic appearing. P: hsv culture. Will treat with acyclovir, clotrimazole and pain medicine. Standard narcotic and sedating medication warnings given  Patient's results have been reviewed with them. Patient and/or family have verbally conveyed their understanding and agreement of the patient's signs, symptoms, diagnosis, treatment and prognosis and additionally agree to follow up as recommended or return to the Emergency Room should their condition change prior to follow-up. Discharge instructions have also been provided to the patient with some educational information regarding their diagnosis as well a list of reasons why they would want to return to the ER prior to their follow-up appointment should their condition change. Amount and/or Complexity of Data Reviewed  Discuss the patient with other providers: yes (ER Attending-Magnant)    Patient Progress  Patient progress: stable        ED Course       Procedures         Pt case including HPI, PE, and all available lab and radiology results has been discussed with attending physician. Opportunity to evaluate patient has been provided to ER attending. Discharge and prescription plan has been agreed upon.

## 2018-08-08 NOTE — ED TRIAGE NOTES
Pt complains of rash around his rectum and between his buttocks, states rash is painful. Rash started Monday.

## 2018-08-12 LAB
HSV SPEC CULT: NORMAL
SPECIMEN SOURCE: NORMAL

## 2019-01-10 ENCOUNTER — HOSPITAL ENCOUNTER (EMERGENCY)
Age: 64
Discharge: HOME | End: 2019-01-10
Attending: EMERGENCY MEDICINE
Payer: COMMERCIAL

## 2019-01-10 ENCOUNTER — APPOINTMENT (OUTPATIENT)
Dept: GENERAL RADIOLOGY | Age: 64
End: 2019-01-10
Attending: PHYSICIAN ASSISTANT
Payer: COMMERCIAL

## 2019-01-10 VITALS
DIASTOLIC BLOOD PRESSURE: 111 MMHG | BODY MASS INDEX: 33.18 KG/M2 | WEIGHT: 224 LBS | HEIGHT: 69 IN | HEART RATE: 72 BPM | OXYGEN SATURATION: 99 % | TEMPERATURE: 98.1 F | SYSTOLIC BLOOD PRESSURE: 185 MMHG | RESPIRATION RATE: 18 BRPM

## 2019-01-10 DIAGNOSIS — M47.816 OSTEOARTHRITIS OF LUMBAR SPINE, UNSPECIFIED SPINAL OSTEOARTHRITIS COMPLICATION STATUS: Primary | ICD-10-CM

## 2019-01-10 PROCEDURE — 99283 EMERGENCY DEPT VISIT LOW MDM: CPT

## 2019-01-10 PROCEDURE — 74011250637 HC RX REV CODE- 250/637: Performed by: PHYSICIAN ASSISTANT

## 2019-01-10 PROCEDURE — 72100 X-RAY EXAM L-S SPINE 2/3 VWS: CPT

## 2019-01-10 RX ORDER — HYDROCODONE BITARTRATE AND ACETAMINOPHEN 5; 325 MG/1; MG/1
1 TABLET ORAL
Status: COMPLETED | OUTPATIENT
Start: 2019-01-10 | End: 2019-01-10

## 2019-01-10 RX ORDER — METHOCARBAMOL 500 MG/1
750 TABLET, FILM COATED ORAL
Status: COMPLETED | OUTPATIENT
Start: 2019-01-10 | End: 2019-01-10

## 2019-01-10 RX ORDER — DICLOFENAC POTASSIUM 50 MG/1
50 TABLET, FILM COATED ORAL 3 TIMES DAILY
Qty: 15 TAB | Refills: 0 | Status: SHIPPED | OUTPATIENT
Start: 2019-01-10 | End: 2019-01-14 | Stop reason: SDUPTHER

## 2019-01-10 RX ORDER — METHOCARBAMOL 750 MG/1
750 TABLET, FILM COATED ORAL 4 TIMES DAILY
Qty: 15 TAB | Refills: 0 | Status: SHIPPED | OUTPATIENT
Start: 2019-01-10 | End: 2019-01-14 | Stop reason: SDUPTHER

## 2019-01-10 RX ADMIN — HYDROCODONE BITARTRATE AND ACETAMINOPHEN 1 TABLET: 5; 325 TABLET ORAL at 19:29

## 2019-01-10 RX ADMIN — METHOCARBAMOL TABLETS 750 MG: 500 TABLET, COATED ORAL at 19:29

## 2019-01-10 NOTE — ED TRIAGE NOTES
Patient arrives c/o left lower back pain x Tuesday. Patient states he does a lot of lifting at work. Pain is worse when he sits down.

## 2019-01-10 NOTE — LETTER
[address] 
[address] [place], [place] [address] [place] [facility] [address] 
[phone] Work/School Note Date: 1/10/2019 To Whom It May concern: 
 
Alberto Rocha was seen and treated today in the emergency room by the following provider(s): 
Attending Provider: Tamera Price DO Physician Assistant: Imelda VIERA, [address]. Alberto Rocha may return to work onTuesday January 15 , 2019 Sincerely, Faiza Pardo, Cash Ascencio

## 2019-01-10 NOTE — ED PROVIDER NOTES
59 y.o. male with no significant past medical history presents ambulatory with chief complaint of left lower back pain, 10/10 in severity, onset 3 days ago and constant. 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 He indicates that his pain feels \"like pins\" when he is sitting, noting that he is unable to stand without exacerbation. Pt adds that he took Excedrin for his pain, noting that his last dose was today at 12 PM without any relief. He denies any medical history, recent surgeries, or recent travels at this time. Pt denies any numbness, tingling, difficulty urinating, dysuria, urinary incontinence, constipation, diarrhea, abd pain, fevers, cough, rhinorrhea, chest pain, shortness of breath, ear pain, congestion, or LE pain currently. There are no other acute medical concerns at this time. Social hx: Patient denies Tobacco use. Denies EtOH use. Denies illicit drug abuse. PCP: Claudia Holbrook NP Note written by Gabriela Rm, as dictated by April Donald, [place], 6:49 PM 
 
 
 
The history is provided by the patient. No  was used. History reviewed. No pertinent past medical history. History reviewed. No pertinent surgical history. Family History:  
Problem Relation Age of Onset  Diabetes Mother  Diabetes Sister  Hypertension Sister  Diabetes Brother  Hypertension Brother Social History Socioeconomic History  Marital status:  Spouse name: Not on file  Number of children: Not on file  Years of education: Not on file  Highest education level: Not on file Social Needs  Financial resource strain: Not on file  Food insecurity - worry: Not on file  Food insecurity - inability: Not on file  Transportation needs - medical: Not on file  Transportation needs - non-medical: Not on file Occupational History  Not on file Tobacco Use  
  Smoking status: Never Smoker  Smokeless tobacco: Never Used Substance and Sexual Activity  Alcohol use: Not on file  Drug use: No  
 Sexual activity: Not on file Other Topics Concern  Not on file Social History Narrative  Not on file ALLERGIES: Prednisone Review of Systems Constitutional: Negative. Negative for chills and fever. HENT: Negative for congestion, ear pain, rhinorrhea, sore throat and voice change. Eyes: Negative. Negative for itching. Respiratory: Negative for cough and shortness of breath. Cardiovascular: Negative for chest pain. Gastrointestinal: Negative for abdominal pain, constipation, diarrhea and vomiting. No bowel incontinence Genitourinary: Negative for difficulty urinating, dysuria, frequency and urgency. Negative for urinary incontinence. Musculoskeletal: Positive for back pain (left lower). Negative for arthralgias and myalgias. Neurological: Negative for weakness, numbness (or tingling) and headaches. All other systems reviewed and are negative. Vitals:  
 01/10/19 1751 01/10/19 1845 BP:  (!) 193/107 Pulse: 88 80 Resp:  18 Temp:  97.7 °F (36.5 °C) SpO2: 98% 98% Weight:  101.6 kg (224 lb) Height:  5' 9\" (1.753 m) Physical Exam  
Constitutional: He is oriented to person, place, and time. He appears well-developed and well-nourished. No distress. Well appearing AAM in [facility] HENT:  
Head: Normocephalic and atraumatic. Right Ear: External ear normal.  
Left Ear: External ear normal.  
Nose: Nose normal.  
Mouth/Throat: Oropharynx is clear and moist. No oropharyngeal exudate. Eyes: Conjunctivae and EOM are normal. Pupils are equal, round, and reactive to light. Right eye exhibits no discharge. Left eye exhibits no discharge. Neck: Normal range of motion. Neck supple. Cardiovascular: Normal rate, regular rhythm and normal heart sounds. Pulmonary/Chest: Effort normal and breath sounds normal. He has no wheezes. He has no rales. Abdominal: Soft. Bowel sounds are normal. He exhibits no distension. There is no tenderness. There is no guarding. Musculoskeletal: Normal range of motion. Lumbar back: He exhibits tenderness (left sided paraspinal). He exhibits normal range of motion, no bony tenderness, no swelling, no edema, no deformity and normal pulse. Lymphadenopathy:  
  He has no cervical adenopathy. Neurological: He is alert and oriented to person, place, and time. No cranial nerve deficit. Skin: Skin is warm and dry. He is not diaphoretic. Psychiatric: He has a normal mood and affect. His behavior is normal.  
Nursing note and vitals reviewed. MDM Number of Diagnoses or Management Options Diagnosis management comments: 58 yo AAM with complaint of left sided lower back pain x two days. No e/o cord compression or abscess. ? Lumbar strain vs acute exacerbation of DDD Plan 
xr lumb spine Analgesia. Alexx Salmeronma Amount and/or Complexity of Data Reviewed Tests in the radiology section of CPT®: ordered and reviewed Independent visualization of images, tracings, or specimens: yes Procedures Progress note Imaging reviewed. Bhakti Salmeron Patient's results have been reviewed with them. Patient and/or family have verbally conveyed their understanding and agreement of the patient's signs, symptoms, diagnosis, treatment and prognosis and additionally agree to follow up as recommended or return to the Emergency Room should their condition change prior to follow-up. Discharge instructions have also been provided to the patient with some educational information regarding their diagnosis as well a list of reasons why they would want to return to the ER prior to their follow-up appointment should their condition change.  Bhakti Salmeron

## 2019-01-11 NOTE — ED NOTES
Patient has been medically cleared for discharge at this time. Given all papers and education by provider at this time. Seen ambulating out of the department with a steady gait and all belongings.

## 2019-01-11 NOTE — DISCHARGE INSTRUCTIONS
Patient Education   Follow-up with ortho  Diclofenac as prescribed  Robaxin as prescribed  Return for any new or worsening. [place], [place]       Low Back Arthritis: Exercises  Your Care Instructions  Here are some examples of typical rehabilitation exercises for your condition. Start each exercise slowly. Ease off the exercise if you start to have pain. Your doctor or physical therapist will tell you when you can start these exercises and which ones will work best for you. When you are not being active, find a comfortable position for rest. Some people are comfortable on the floor or a medium-firm bed with a small pillow under their head and another under their knees. Some people prefer to lie on their side with a pillow between their knees. Don't stay in one position for too long. Take short walks (10 to 20 minutes) every 2 to 3 hours. Avoid slopes, hills, and stairs until you feel better. Walk only distances you can manage without pain, especially leg pain. How to do the exercises  Pelvic tilt    1. Lie on your back with your knees bent. 2. \"Brace\" your stomach--tighten your muscles by pulling in and imagining your belly button moving toward your spine. 3. Press your lower back into the floor. You should feel your hips and pelvis rock back. 4. Hold for 6 seconds while breathing smoothly. 5. Relax and allow your pelvis and hips to rock forward. 6. Repeat 8 to 12 times. Back stretches    1. Get down on your hands and knees on the floor. 2. Relax your head and allow it to droop. Round your back up toward the ceiling until you feel a nice stretch in your upper, middle, and lower back. Hold this stretch for as long as it feels comfortable, or about 15 to 30 seconds. 3. Return to the starting position with a flat back while you are on your hands and knees. 4. Let your back sway by pressing your stomach toward the floor. Lift your buttocks toward the ceiling.   5. Hold this position for 15 to 30 seconds. 6. Repeat 2 to 4 times. Follow-up care is a key part of your treatment and safety. Be sure to make and go to all appointments, and call your doctor if you are having problems. It's also a good idea to know your test results and keep a list of the medicines you take. Where can you learn more? Go to http://tr-efraín.info/. Enter R201 in the search box to learn more about \"Low Back Arthritis: Exercises. \"  Current as of: November 29, 2017  Content Version: 11.8  © 9434-1556 Healthwise, Incorporated. Care instructions adapted under license by [employer] (which disclaims liability or warranty for this information). If you have questions about a medical condition or this instruction, always ask your healthcare professional. [address] any warranty or liability for your use of this information.

## 2019-01-14 ENCOUNTER — OFFICE VISIT (OUTPATIENT)
Dept: FAMILY MEDICINE CLINIC | Age: 64
End: 2019-01-14

## 2019-01-14 VITALS
SYSTOLIC BLOOD PRESSURE: 152 MMHG | WEIGHT: 230 LBS | HEIGHT: 69 IN | TEMPERATURE: 98.3 F | OXYGEN SATURATION: 98 % | DIASTOLIC BLOOD PRESSURE: 100 MMHG | BODY MASS INDEX: 34.07 KG/M2 | HEART RATE: 71 BPM | RESPIRATION RATE: 16 BRPM

## 2019-01-14 DIAGNOSIS — E66.9 OBESITY (BMI 30.0-34.9): ICD-10-CM

## 2019-01-14 DIAGNOSIS — I10 ESSENTIAL HYPERTENSION: Primary | ICD-10-CM

## 2019-01-14 DIAGNOSIS — M47.816 ARTHRITIS OF LUMBAR SPINE: ICD-10-CM

## 2019-01-14 DIAGNOSIS — Z76.89 ENCOUNTER TO ESTABLISH CARE: ICD-10-CM

## 2019-01-14 DIAGNOSIS — M17.11 PRIMARY OSTEOARTHRITIS OF RIGHT KNEE: ICD-10-CM

## 2019-01-14 RX ORDER — METHOCARBAMOL 750 MG/1
750 TABLET, FILM COATED ORAL 3 TIMES DAILY
Qty: 30 TAB | Refills: 0 | Status: SHIPPED | OUTPATIENT
Start: 2019-01-14 | End: 2020-06-03 | Stop reason: ALTCHOICE

## 2019-01-14 RX ORDER — DICLOFENAC POTASSIUM 50 MG/1
50 TABLET, FILM COATED ORAL 2 TIMES DAILY
Qty: 30 TAB | Refills: 0 | Status: SHIPPED | OUTPATIENT
Start: 2019-01-14 | End: 2019-02-20 | Stop reason: ALTCHOICE

## 2019-01-14 RX ORDER — AMLODIPINE BESYLATE 5 MG/1
5 TABLET ORAL DAILY
Qty: 30 TAB | Refills: 4 | Status: SHIPPED | OUTPATIENT
Start: 2019-01-14 | End: 2019-08-08 | Stop reason: SDUPTHER

## 2019-01-14 NOTE — PATIENT INSTRUCTIONS
DASH Diet: Care Instructions Your Care Instructions The DASH diet is an eating plan that can help lower your blood pressure. DASH stands for Dietary Approaches to Stop Hypertension. Hypertension is high blood pressure. The DASH diet focuses on eating foods that are high in calcium, potassium, and magnesium. These nutrients can lower blood pressure. The foods that are highest in these nutrients are fruits, vegetables, low-fat dairy products, nuts, seeds, and legumes. But taking calcium, potassium, and magnesium supplements instead of eating foods that are high in those nutrients does not have the same effect. The DASH diet also includes whole grains, fish, and poultry. The DASH diet is one of several lifestyle changes your doctor may recommend to lower your high blood pressure. Your doctor may also want you to decrease the amount of sodium in your diet. Lowering sodium while following the DASH diet can lower blood pressure even further than just the DASH diet alone. Follow-up care is a key part of your treatment and safety. Be sure to make and go to all appointments, and call your doctor if you are having problems. It's also a good idea to know your test results and keep a list of the medicines you take. How can you care for yourself at home? Following the DASH diet · Eat 4 to 5 servings of fruit each day. A serving is 1 medium-sized piece of fruit, ½ cup chopped or canned fruit, 1/4 cup dried fruit, or 4 ounces (½ cup) of fruit juice. Choose fruit more often than fruit juice. · Eat 4 to 5 servings of vegetables each day. A serving is 1 cup of lettuce or raw leafy vegetables, ½ cup of chopped or cooked vegetables, or 4 ounces (½ cup) of vegetable juice. Choose vegetables more often than vegetable juice. · Get 2 to 3 servings of low-fat and fat-free dairy each day. A serving is 8 ounces of milk, 1 cup of yogurt, or 1 ½ ounces of cheese. · Eat 6 to 8 servings of grains each day. A serving is 1 slice of bread, 1 ounce of dry cereal, or ½ cup of cooked rice, pasta, or cooked cereal. Try to choose whole-grain products as much as possible. · Limit lean meat, poultry, and fish to 2 servings each day. A serving is 3 ounces, about the size of a deck of cards. · Eat 4 to 5 servings of nuts, seeds, and legumes (cooked dried beans, lentils, and split peas) each week. A serving is 1/3 cup of nuts, 2 tablespoons of seeds, or ½ cup of cooked beans or peas. · Limit fats and oils to 2 to 3 servings each day. A serving is 1 teaspoon of vegetable oil or 2 tablespoons of salad dressing. · Limit sweets and added sugars to 5 servings or less a week. A serving is 1 tablespoon jelly or jam, ½ cup sorbet, or 1 cup of lemonade. · Eat less than 2,300 milligrams (mg) of sodium a day. If you limit your sodium to 1,500 mg a day, you can lower your blood pressure even more. Tips for success · Start small. Do not try to make dramatic changes to your diet all at once. You might feel that you are missing out on your favorite foods and then be more likely to not follow the plan. Make small changes, and stick with them. Once those changes become habit, add a few more changes. · Try some of the following: ? Make it a goal to eat a fruit or vegetable at every meal and at snacks. This will make it easy to get the recommended amount of fruits and vegetables each day. ? Try yogurt topped with fruit and nuts for a snack or healthy dessert. ? Add lettuce, tomato, cucumber, and onion to sandwiches. ? Combine a ready-made pizza crust with low-fat mozzarella cheese and lots of vegetable toppings. Try using tomatoes, squash, spinach, broccoli, carrots, cauliflower, and onions. ? Have a variety of cut-up vegetables with a low-fat dip as an appetizer instead of chips and dip. ? Sprinkle sunflower seeds or chopped almonds over salads.  Or try adding chopped walnuts or almonds to cooked vegetables. ? Try some vegetarian meals using beans and peas. Add garbanzo or kidney beans to salads. Make burritos and tacos with mashed valerio beans or black beans. Where can you learn more? Go to http://tr-efraín.info/. Enter F397 in the search box to learn more about \"DASH Diet: Care Instructions. \" Current as of: December 6, 2017 Content Version: 11.8 © 8002-1532 [employer]. Care instructions adapted under license by [employer] (which disclaims liability or warranty for this information). If you have questions about a medical condition or this instruction, always ask your healthcare professional. [address] any warranty or liability for your use of this information. Low Back Arthritis: Exercises Your Care Instructions Here are some examples of typical rehabilitation exercises for your condition. Start each exercise slowly. Ease off the exercise if you start to have pain. Your doctor or physical therapist will tell you when you can start these exercises and which ones will work best for you. When you are not being active, find a comfortable position for rest. Some people are comfortable on the floor or a medium-firm bed with a small pillow under their head and another under their knees. Some people prefer to lie on their side with a pillow between their knees. Don't stay in one position for too long. Take short walks (10 to 20 minutes) every 2 to 3 hours. Avoid slopes, hills, and stairs until you feel better. Walk only distances you can manage without pain, especially leg pain. How to do the exercises Pelvic tilt 1. Lie on your back with your knees bent. 2. \"Brace\" your stomachtighten your muscles by pulling in and imagining your belly button moving toward your spine. 3. Press your lower back into the floor. You should feel your hips and pelvis rock back. 4. Hold for 6 seconds while breathing smoothly. 5. Relax and allow your pelvis and hips to rock forward. 6. Repeat 8 to 12 times. Back stretches 1. Get down on your hands and knees on the floor. 2. Relax your head and allow it to droop. Round your back up toward the ceiling until you feel a nice stretch in your upper, middle, and lower back. Hold this stretch for as long as it feels comfortable, or about 15 to 30 seconds. 3. Return to the starting position with a flat back while you are on your hands and knees. 4. Let your back sway by pressing your stomach toward the floor. Lift your buttocks toward the ceiling. 5. Hold this position for 15 to 30 seconds. 6. Repeat 2 to 4 times. Follow-up care is a key part of your treatment and safety. Be sure to make and go to all appointments, and call your doctor if you are having problems. It's also a good idea to know your test results and keep a list of the medicines you take. Where can you learn more? Go to http://tr-efraín.info/. Enter G494 in the search box to learn more about \"Low Back Arthritis: Exercises. \" Current as of: November 29, 2017 Content Version: 11.8 © 7562-3803 Healthwise, Incorporated. Care instructions adapted under license by [employer] (which disclaims liability or warranty for this information). If you have questions about a medical condition or this instruction, always ask your healthcare professional. [address] any warranty or liability for your use of this information.

## 2019-01-14 NOTE — PROGRESS NOTES
HISTORY OF PRESENT ILLNESS Yarelis Siddiqi is a 59 y.o. male. He is a new patient and is here to establish care. Previous followed by [facility] internal medicine. The following sections were reviewed & updated as appropriate: PMH, PL, PSH, and SH. He was seen for recent ER visit fo lower back pain and left hip pain. Also blood pressure readings very elevated during his visits HPI Hospital Follow Up Yarelis Siddiqi is seen for follow up from recent ED visit to [facility] on 1/10/19. .  We reviewed the active problem list, medication list, allergies, family history, social history, health maintenance, imaging, other ER provider summary, . He presented with with chief complaint of left lower back pain, 10/10 in severity, onset 3 days ago and constant. 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 And  his pain feels \"like pins\" when he is sitting, noting that he is unable to stand without exacerbation. He had Xray of lumbar spine which showed multilevel degenerative changes in lumbar spine  He is taking his Diclofenac and Methocarbamol  as directed & without any side effects. He reports symptoms are significantly improved. Cardiovascular Review The patient has hypertension. He reports patient does not perform home BP monitoring, no chest pain on exertion, no dyspnea on exertion, no swelling of ankles, no orthostatic dizziness or lightheadedness, no orthopnea or paroxysmal nocturnal dyspnea, no palpitations, no intermittent claudication symptoms, no erectile dysfunction. Diet and Lifestyle: not attempting to follow a low fat, low cholesterol diet, not attempting to follow a low sodium diet, does not rigorously follow a diabetic diet, exercises sporadically, nonsmoker. Lab review: labs reviewed and discussed with patient. Medicines: he is not on medicine His blood pressure readings high during ER visit and today x 2 
 Wt Readings from Last 3 Encounters:  
01/14/19 230 lb (104.3 kg) 01/10/19 224 lb (101.6 kg) 08/08/18 224 lb 6.9 oz (101.8 kg) Temp Readings from Last 3 Encounters:  
01/14/19 98.3 °F (36.8 °C) (Oral) 01/10/19 98.1 °F (36.7 °C)  
08/08/18 97.6 °F (36.4 °C) BP Readings from Last 3 Encounters:  
01/14/19 (!) 152/100  
01/10/19 (!) 185/111  
08/08/18 159/90 Pulse Readings from Last 3 Encounters:  
01/14/19 71  
01/10/19 72  
08/08/18 79 Review of Systems Constitutional: Negative for chills, fever and malaise/fatigue. HENT: Negative for congestion, ear pain, sore throat and tinnitus. Eyes: Negative for blurred vision, double vision, pain and discharge. Respiratory: Negative for cough, shortness of breath and wheezing. Cardiovascular: Negative for chest pain, palpitations and leg swelling. Gastrointestinal: Negative for abdominal pain, blood in stool, constipation, diarrhea, nausea and vomiting. Genitourinary: Negative for dysuria, frequency, hematuria and urgency. Musculoskeletal: Positive for back pain. Negative for joint pain and myalgias. Right knee pain Skin: Negative for rash. Neurological: Negative for dizziness, tremors, seizures and headaches. Endo/Heme/Allergies: Negative for polydipsia. Does not bruise/bleed easily. Psychiatric/Behavioral: Negative for depression and substance abuse. The patient is not nervous/anxious. Physical Exam  
Constitutional: He is oriented to person, place, and time. He appears well-developed and well-nourished. HENT:  
Head: Normocephalic and atraumatic. Right Ear: External ear normal.  
Mouth/Throat: Oropharynx is clear and moist. No oropharyngeal exudate. Eyes: Conjunctivae and EOM are normal. Pupils are equal, round, and reactive to light. No scleral icterus. Neck: Normal range of motion. Neck supple. No JVD present. No thyromegaly present. Cardiovascular: Normal rate, regular rhythm, normal heart sounds and intact distal pulses. No murmur heard. Pulmonary/Chest: Effort normal and breath sounds normal. He has no wheezes. Abdominal: Soft. Bowel sounds are normal. He exhibits no distension and no mass. Musculoskeletal: He exhibits no edema. Right knee: He exhibits decreased range of motion and swelling. Tenderness found. Medial joint line and lateral joint line tenderness noted. Lumbar back: He exhibits pain and spasm. He exhibits normal range of motion, no tenderness and no bony tenderness. SLR and EMMANUEL positive on right side. Negative on left Lymphadenopathy:  
  He has no cervical adenopathy. Neurological: He is alert and oriented to person, place, and time. He has normal reflexes. No cranial nerve deficit. Skin: Skin is warm and dry. No rash noted. He is not diaphoretic. Psychiatric: He has a normal mood and affect. Nursing note and vitals reviewed. ASSESSMENT and PLAN Diagnoses and all orders for this visit: 1. Essential hypertension 
-     amLODIPine (NORVASC) 5 mg tablet; Take 1 Tab by mouth daily. 
-     METABOLIC PANEL, BASIC 
 
2. Obesity (BMI 30.0-34. 9) Assessment & Plan: 
Discussed abnormal weight, ideal BMI and importance of diet and exercise to loose weight. Referral for exercise program was offered. Printed information about diet and lifestyle modification provided. 3. Arthritis of lumbar spine 
-     diclofenac potassium (CATAFLAM) 50 mg tablet; Take 1 Tab by mouth two (2) times a day. -     methocarbamol (ROBAXIN-750) 750 mg tablet; Take 1 Tab by mouth three (3) times daily. 4. Primary osteoarthritis of right knee 
-     diclofenac potassium (CATAFLAM) 50 mg tablet; Take 1 Tab by mouth two (2) times a day. -     methocarbamol (ROBAXIN-750) 750 mg tablet; Take 1 Tab by mouth three (3) times daily. 5. Encounter to establish care discussed low back exercise and knee arthritis exercise and print out was given Started on Amlodipine, discussed DASH diet and low salt diet Discussed lifestyle issues and health guidance given Patient was given an after visit summary which includes diagnoses, vital signs, current medications, instructions and references & authorized prescriptions . Results of labs will be conveyed to patient, once available. Pt verbalized instructions I provided and expressed understanding of discussion that was held today.  
Follow-up Disposition: 
Return in about 4 weeks (around 2/11/2019) for physical.

## 2019-01-14 NOTE — PROGRESS NOTES
Chief Complaint Patient presents with  
[facility] Follow Up New Patient . Concerned about elevated blood pressures and left hip pain  Establish Care  
  168/98 , 163/90, 158/98, 168/93:  elevated blood pressure 1. Have you been to the ER, urgent care clinic since your last visit? Hospitalized since your last visit? No 
 
2. Have you seen or consulted any other health care providers outside of the [address] [place] [address] since your last visit? Include any pap smears or colon screening.  No

## 2019-01-15 LAB
BUN SERPL-MCNC: 12 MG/DL (ref 8–27)
BUN/CREAT SERPL: 12 (ref 10–24)
CALCIUM SERPL-MCNC: 9.5 MG/DL (ref 8.6–10.2)
CHLORIDE SERPL-SCNC: 104 MMOL/L (ref 96–106)
CO2 SERPL-SCNC: 26 MMOL/L (ref 20–29)
CREAT SERPL-MCNC: 1.01 MG/DL (ref 0.76–1.27)
GLUCOSE SERPL-MCNC: 83 MG/DL (ref 65–99)
POTASSIUM SERPL-SCNC: 4.1 MMOL/L (ref 3.5–5.2)
SODIUM SERPL-SCNC: 144 MMOL/L (ref 134–144)

## 2019-01-16 ENCOUNTER — TELEPHONE (OUTPATIENT)
Dept: FAMILY MEDICINE CLINIC | Age: 64
End: 2019-01-16

## 2019-01-16 NOTE — TELEPHONE ENCOUNTER
----- Message from Brad Flynn sent at 1/16/2019  2:01 PM EST -----  Regarding: Dr. Christi Penny Telephone  Robbie Silvacheryle Chacha, pt wife requesting a call back from a missed call. Mrs. Inez Mercado best contact number is [phone]

## 2019-01-16 NOTE — PROGRESS NOTES
Inbound call from patients wife. Returning nurse call. Patients name and  verified. Reviewed recent labs. Patient verbalized understanding.

## 2019-02-20 ENCOUNTER — OFFICE VISIT (OUTPATIENT)
Dept: FAMILY MEDICINE CLINIC | Age: 64
End: 2019-02-20

## 2019-02-20 VITALS
WEIGHT: 225 LBS | DIASTOLIC BLOOD PRESSURE: 80 MMHG | SYSTOLIC BLOOD PRESSURE: 132 MMHG | HEIGHT: 69 IN | BODY MASS INDEX: 33.33 KG/M2 | RESPIRATION RATE: 16 BRPM | HEART RATE: 67 BPM | OXYGEN SATURATION: 97 % | TEMPERATURE: 98.2 F

## 2019-02-20 DIAGNOSIS — Z00.00 ROUTINE GENERAL MEDICAL EXAMINATION AT A HEALTH CARE FACILITY: Primary | ICD-10-CM

## 2019-02-20 DIAGNOSIS — E55.9 VITAMIN D DEFICIENCY: ICD-10-CM

## 2019-02-20 DIAGNOSIS — M17.11 PRIMARY OSTEOARTHRITIS OF RIGHT KNEE: ICD-10-CM

## 2019-02-20 DIAGNOSIS — Z12.11 COLON CANCER SCREENING: ICD-10-CM

## 2019-02-20 RX ORDER — CHOLECALCIFEROL (VITAMIN D3) 125 MCG
CAPSULE ORAL
COMMUNITY
End: 2019-04-08 | Stop reason: SDUPTHER

## 2019-02-20 RX ORDER — MELOXICAM 15 MG/1
15 TABLET ORAL
Qty: 30 TAB | Refills: 1 | Status: SHIPPED | OUTPATIENT
Start: 2019-02-20 | End: 2020-02-21 | Stop reason: SDUPTHER

## 2019-02-20 NOTE — LETTER
NOTIFICATION RETURN TO WORK  
 
2/20/2019 12:17 PM 
 
Mr. Bernarda Salas [address] [place] [id number] To Whom It May Concern: 
 
Bernarda Salas is currently under the care of MEÑO Reyes. He will return to work on: 02/21/2019 If there are questions or concerns please have the patient contact our office. Sincerely, Olivia Venegas MD

## 2019-02-20 NOTE — PROGRESS NOTES
HISTORY OF PRESENT ILLNESS Kimberly Cedillo is a 59 y.o. male. he was seen for complete physical and one month follow up on his previous visit to establish care and hypertension. His back pain has improved significantly. HPI Health Maintenance Immunizations:  
  Influenza: he declined. Tetanus: unknown, record requested. Shingles: not UTD - script provided. Pneumonia: n/a. Cancer screening:   
 Colon: reviewed guidelines, referral placed to GI. Prostate: reviewed guidelines, order placed. He is concerned about right knee pain radiating down to right calf Knee Pain Patient complains of right knee pain and knee swelling. Onset of the symptoms was problem is longstanding. Inciting event: none known. Current symptoms include pain location: right sided: posterior, severity of pain = moderate, swelling: mild, knee gives out, knee locks, crepitus sensation, foreign body sensation and popping sensation. Associated symptoms: none. Aggravating symptoms: going up and down stairs, standing, walking, rising after sitting. Patient's overall course: symptoms have progressed to a point and plateaued. and course of pain: symptoms have progressed to a point and plateaued. Patient has had no prior knee problems. Patient denies previous knee procedure. Evaluation to date: none. Treatment to date: none. Patient Care Team: 
Charlee Gurrola MD as PCP - [facility]) LIDYA Puentes RN The following sections were reviewed & updated as appropriate: PMH, PSH, FH, and SH. Patient Active Problem List  
Diagnosis Code  Essential hypertension I10  
 Obesity (BMI 30.0-34. 9) E66.9  Arthritis of lumbar spine M47.816  Primary osteoarthritis of right knee M17.11 Prior to Admission medications Medication Sig Start Date End Date Taking? Authorizing Provider  
cholecalciferol, vitamin D3, (VITAMIN D3) 2,000 unit tab Take  by mouth.    Yes Provider, Historical  
 meloxicam (MOBIC) 15 mg tablet Take 1 Tab by mouth nightly. 2/20/19  Yes Aneudy Bhatti MD  
amLODIPine (NORVASC) 5 mg tablet Take 1 Tab by mouth daily. 1/14/19  Yes Aneudy Bhatti MD  
methocarbamol (ROBAXIN-750) 750 mg tablet Take 1 Tab by mouth three (3) times daily. 1/14/19  Yes Aneudy Bhatti MD  
albuterol (PROVENTIL HFA, VENTOLIN HFA, PROAIR HFA) 90 mcg/actuation inhaler Take 2 Puffs by inhalation every six (6) hours as needed for Wheezing or Shortness of Breath (or cough). 2/2/18  Yes Perry Crespo NP  
ergocalciferol (ERGOCALCIFEROL) 50,000 unit capsule Take 1 Cap by mouth every seven (7) days. 2/8/18   Dahiana Gandhi NP Allergies Allergen Reactions  Prednisone Other (comments) Hiccups Review of Systems Constitutional: Negative for chills, fever and malaise/fatigue. HENT: Negative for congestion, ear pain, sore throat and tinnitus. Eyes: Negative for blurred vision, double vision, pain and discharge. Respiratory: Negative for cough, shortness of breath and wheezing. Cardiovascular: Negative for chest pain, palpitations and leg swelling. Gastrointestinal: Negative for abdominal pain, blood in stool, constipation, diarrhea, nausea and vomiting. Genitourinary: Negative for dysuria, frequency, hematuria and urgency. Musculoskeletal: Negative for back pain, joint pain and myalgias. Right knee pain Skin: Negative for rash. Neurological: Negative for dizziness, tremors, seizures and headaches. Endo/Heme/Allergies: Negative for polydipsia. Does not bruise/bleed easily. Psychiatric/Behavioral: Negative for depression and substance abuse. The patient is not nervous/anxious. Physical Exam  
Constitutional: He is oriented to person, place, and time. He appears well-developed and well-nourished. HENT:  
Head: Normocephalic and atraumatic.   
Right Ear: External ear normal.  
Left Ear: External ear normal.  
Nose: Nose normal.  
 Mouth/Throat: Oropharynx is clear and moist. No oropharyngeal exudate. Eyes: Conjunctivae and EOM are normal. Pupils are equal, round, and reactive to light. Neck: Normal range of motion. Neck supple. No thyromegaly present. Cardiovascular: Normal rate, regular rhythm, normal heart sounds and intact distal pulses. No murmur heard. Pulmonary/Chest: Effort normal and breath sounds normal. No respiratory distress. He has no wheezes. Abdominal: Soft. Bowel sounds are normal. He exhibits no distension and no mass. Genitourinary: Testes normal and penis normal. Right testis shows no mass. Left testis shows no mass. Musculoskeletal: He exhibits no edema. Right knee: He exhibits decreased range of motion and swelling. Tenderness (popliteal fossa) found. Legs: 
Neurological: He is alert and oriented to person, place, and time. He has normal strength and normal reflexes. No sensory deficit. Cranial nerves 2-12 normal  
Skin: Skin is warm and dry. No rash noted. Psychiatric: He has a normal mood and affect. His behavior is normal. Thought content normal.  
Nursing note and vitals reviewed. ASSESSMENT and PLAN Diagnoses and all orders for this visit: 1. Routine general medical examination at a health care facility 
-     CBC WITH AUTOMATED DIFF 
-     METABOLIC PANEL, COMPREHENSIVE 
-     PSA W/ REFLX FREE PSA 
-     HEMOGLOBIN A1C WITH EAG 
-     TSH REFLEX TO T4 
-     URINALYSIS W/ RFLX MICROSCOPIC 
-     LIPID PANEL 2. Primary osteoarthritis of right knee 
-     meloxicam (MOBIC) 15 mg tablet; Take 1 Tab by mouth nightly. 3. Colon cancer screening 
-     REFERRAL TO GASTROENTEROLOGY 4. Vitamin D deficiency 
-     VITAMIN D, 25 HYDROXY Discussed lifestyle issues and health guidance given Discussed and demonstrated knee exercise and print out was given Patient was given an after visit summary which includes diagnoses, vital signs, current medications, instructions and references & authorized prescriptions . Results of labs will be conveyed to patient, once available. Pt verbalized instructions I provided and expressed understanding of discussion that was held today. Follow-up Disposition: 
Return in about 6 months (around 8/20/2019) for follow up.

## 2019-02-20 NOTE — PATIENT INSTRUCTIONS
Knee Arthritis: Exercises Your Care Instructions Here are some examples of exercises for knee arthritis. Start each exercise slowly. Ease off the exercise if you start to have pain. Your doctor or physical therapist will tell you when you can start these exercises and which ones will work best for you. How to do the exercises Knee flexion with heel slide 1. Lie on your back with your knees bent. 2. Slide your heel back by bending your affected knee as far as you can. Then hook your other foot around your ankle to help pull your heel even farther back. 3. Hold for about 6 seconds, then rest for up to 10 seconds. 4. Repeat 8 to 12 times. 5. Switch legs and repeat steps 1 through 4, even if only one knee is sore. GoBeMe 1. Sit with your affected leg straight and supported on the floor or a firm bed. Place a small, rolled-up towel under your knee. Your other leg should be bent, with that foot flat on the floor. 2. Tighten the thigh muscles of your affected leg by pressing the back of your knee down into the towel. 3. Hold for about 6 seconds, then rest for up to 10 seconds. 4. Repeat 8 to 12 times. 5. Switch legs and repeat steps 1 through 4, even if only one knee is sore. Straight-leg raises to the front 1. Lie on your back with your good knee bent so that your foot rests flat on the floor. Your affected leg should be straight. Make sure that your low back has a normal curve. You should be able to slip your hand in between the floor and the small of your back, with your palm touching the floor and your back touching the back of your hand. 2. Tighten the thigh muscles in your affected leg by pressing the back of your knee flat down to the floor. Hold your knee straight. 3. Keeping the thigh muscles tight and your leg straight, lift your affected leg up so that your heel is about 12 inches off the floor. Hold for about 6 seconds, then lower slowly. 4. Relax for up to 10 seconds between repetitions. 5. Repeat 8 to 12 times. 6. Switch legs and repeat steps 1 through 5, even if only one knee is sore. Active knee flexion 1. Lie on your stomach with your knees straight. If your kneecap is uncomfortable, roll up a washcloth and put it under your leg just above your kneecap. 2. Lift the foot of your affected leg by bending the knee so that you bring the foot up toward your buttock. If this motion hurts, try it without bending your knee quite as far. This may help you avoid any painful motion. 3. Slowly move your leg up and down. 4. Repeat 8 to 12 times. 5. Switch legs and repeat steps 1 through 4, even if only one knee is sore. Quadriceps stretch (facedown) 1. Lie flat on your stomach, and rest your face on the floor. 2. Wrap a towel or belt strap around the lower part of your affected leg. Then use the towel or belt strap to slowly pull your heel toward your buttock until you feel a stretch. 3. Hold for about 15 to 30 seconds, then relax your leg against the towel or belt strap. 4. Repeat 2 to 4 times. 5. Switch legs and repeat steps 1 through 4, even if only one knee is sore. Stationary exercise bike 1. If you do not have a stationary exercise bike at home, you can find one to ride at your local health club or community center. 2. Adjust the height of the bike seat so that your knee is slightly bent when your leg is extended downward. If your knee hurts when the pedal reaches the top, you can raise the seat so that your knee does not bend as much. 3. Start slowly. At first, try to do 5 to 10 minutes of cycling with little to no resistance. Then increase your time and the resistance bit by bit until you can do 20 to 30 minutes without pain. 4. If you start to have pain, rest your knee until your pain gets back to the level that is normal for you. Or cycle for less time or with less effort. Follow-up care is a key part of your treatment and safety. Be sure to make and go to all appointments, and call your doctor if you are having problems. It's also a good idea to know your test results and keep a list of the medicines you take. Where can you learn more? Go to http://tr-efraín.info/. Enter C159 in the search box to learn more about \"Knee Arthritis: Exercises. \" Current as of: September 20, 2018 Content Version: 11.9 © 7647-5166 [employer]. Care instructions adapted under license by [employer] (which disclaims liability or warranty for this information). If you have questions about a medical condition or this instruction, always ask your healthcare professional. [address] any warranty or liability for your use of this information.

## 2019-04-08 DIAGNOSIS — E55.9 VITAMIN D DEFICIENCY: Primary | ICD-10-CM

## 2019-04-08 LAB
25(OH)D3+25(OH)D2 SERPL-MCNC: 14.9 NG/ML (ref 30–100)
ALBUMIN SERPL-MCNC: 4.4 G/DL (ref 3.6–4.8)
ALBUMIN/GLOB SERPL: 1.4 {RATIO} (ref 1.2–2.2)
ALP SERPL-CCNC: 66 IU/L (ref 39–117)
ALT SERPL-CCNC: 29 IU/L (ref 0–44)
APPEARANCE UR: CLEAR
AST SERPL-CCNC: 27 IU/L (ref 0–40)
BASOPHILS # BLD AUTO: 0 X10E3/UL (ref 0–0.2)
BASOPHILS NFR BLD AUTO: 1 %
BILIRUB SERPL-MCNC: 0.4 MG/DL (ref 0–1.2)
BILIRUB UR QL STRIP: NEGATIVE
BUN SERPL-MCNC: 23 MG/DL (ref 8–27)
BUN/CREAT SERPL: 18 (ref 10–24)
CALCIUM SERPL-MCNC: 9.2 MG/DL (ref 8.6–10.2)
CHLORIDE SERPL-SCNC: 105 MMOL/L (ref 96–106)
CHOLEST SERPL-MCNC: 170 MG/DL (ref 100–199)
CO2 SERPL-SCNC: 24 MMOL/L (ref 20–29)
COLOR UR: YELLOW
CREAT SERPL-MCNC: 1.26 MG/DL (ref 0.76–1.27)
EOSINOPHIL # BLD AUTO: 0.7 X10E3/UL (ref 0–0.4)
EOSINOPHIL NFR BLD AUTO: 11 %
ERYTHROCYTE [DISTWIDTH] IN BLOOD BY AUTOMATED COUNT: 14.9 % (ref 12.3–15.4)
EST. AVERAGE GLUCOSE BLD GHB EST-MCNC: 131 MG/DL
GLOBULIN SER CALC-MCNC: 3.2 G/DL (ref 1.5–4.5)
GLUCOSE SERPL-MCNC: 110 MG/DL (ref 65–99)
GLUCOSE UR QL: NEGATIVE
HBA1C MFR BLD: 6.2 % (ref 4.8–5.6)
HCT VFR BLD AUTO: 43.4 % (ref 37.5–51)
HDLC SERPL-MCNC: 49 MG/DL
HGB BLD-MCNC: 15.3 G/DL (ref 13–17.7)
HGB UR QL STRIP: NEGATIVE
IMM GRANULOCYTES # BLD AUTO: 0 X10E3/UL (ref 0–0.1)
IMM GRANULOCYTES NFR BLD AUTO: 0 %
INTERPRETATION, 910389: NORMAL
KETONES UR QL STRIP: NEGATIVE
LDLC SERPL CALC-MCNC: 109 MG/DL (ref 0–99)
LEUKOCYTE ESTERASE UR QL STRIP: NEGATIVE
LYMPHOCYTES # BLD AUTO: 2.6 X10E3/UL (ref 0.7–3.1)
LYMPHOCYTES NFR BLD AUTO: 43 %
MCH RBC QN AUTO: 32.2 PG (ref 26.6–33)
MCHC RBC AUTO-ENTMCNC: 35.3 G/DL (ref 31.5–35.7)
MCV RBC AUTO: 91 FL (ref 79–97)
MICRO URNS: NORMAL
MONOCYTES # BLD AUTO: 0.4 X10E3/UL (ref 0.1–0.9)
MONOCYTES NFR BLD AUTO: 6 %
NEUTROPHILS # BLD AUTO: 2.4 X10E3/UL (ref 1.4–7)
NEUTROPHILS NFR BLD AUTO: 39 %
NITRITE UR QL STRIP: NEGATIVE
PH UR STRIP: 5.5 [PH] (ref 5–7.5)
PLATELET # BLD AUTO: 196 X10E3/UL (ref 150–379)
POTASSIUM SERPL-SCNC: 4.6 MMOL/L (ref 3.5–5.2)
PROT SERPL-MCNC: 7.6 G/DL (ref 6–8.5)
PROT UR QL STRIP: NEGATIVE
PSA FREE MFR SERPL: 21.8 %
PSA FREE SERPL-MCNC: 1.44 NG/ML
PSA SERPL-MCNC: 6.6 NG/ML (ref 0–4)
RBC # BLD AUTO: 4.75 X10E6/UL (ref 4.14–5.8)
REFLEX CRITERIA: ABNORMAL
SODIUM SERPL-SCNC: 142 MMOL/L (ref 134–144)
SP GR UR: 1.02 (ref 1–1.03)
TRIGL SERPL-MCNC: 60 MG/DL (ref 0–149)
TSH SERPL DL<=0.005 MIU/L-ACNC: 0.76 UIU/ML (ref 0.45–4.5)
UROBILINOGEN UR STRIP-MCNC: 0.2 MG/DL (ref 0.2–1)
VLDLC SERPL CALC-MCNC: 12 MG/DL (ref 5–40)
WBC # BLD AUTO: 6.1 X10E3/UL (ref 3.4–10.8)

## 2019-04-08 RX ORDER — ERGOCALCIFEROL 1.25 MG/1
50000 CAPSULE ORAL
Qty: 4 CAP | Refills: 4 | Status: SHIPPED | OUTPATIENT
Start: 2019-04-08 | End: 2020-06-03 | Stop reason: ALTCHOICE

## 2019-04-08 NOTE — PROGRESS NOTES
Please inform patient I have reviewed your results. Blood count,kidney,liver,cholesterol,thyroid,urine results are normal 
Total PSA up, free PSA normal, likely from prostate enlargement . Will recommend to be on medicine to help with prostate symptoms. Can refer to urology, if that is his preference Vitamin D very low, recommend to be on high dose Rx. Will send new Rx to pharmacy Diabetes screen abnormal and is in prediabetic range. To work hard on diet and exercise

## 2019-04-08 NOTE — PROGRESS NOTES
Call placed to number on file spoke with wife who is on PHI. Started to review patients results she asked if she could call me back as she had residents with her. Provided me with patients correct number. Call placed to patient. No answer left message for patient to return call to office regarding lab.

## 2019-04-09 DIAGNOSIS — R97.20 ABNORMAL PSA: Primary | ICD-10-CM

## 2019-04-09 DIAGNOSIS — N40.0 BENIGN PROSTATIC HYPERPLASIA WITHOUT LOWER URINARY TRACT SYMPTOMS: ICD-10-CM

## 2019-04-09 RX ORDER — FINASTERIDE 1 MG/1
1 TABLET, FILM COATED ORAL DAILY
Qty: 30 TAB | Refills: 5 | Status: SHIPPED | OUTPATIENT
Start: 2019-04-09 | End: 2019-04-10 | Stop reason: ALTCHOICE

## 2019-04-09 NOTE — PROGRESS NOTES
Call placed to patients wife who is on PHI. Patients name and  verified. Reviewed recent labs. Wife would like a referral placed for urology. inquiring about who would prescribe medication.

## 2019-04-10 ENCOUNTER — TELEPHONE (OUTPATIENT)
Dept: FAMILY MEDICINE CLINIC | Age: 64
End: 2019-04-10

## 2019-04-10 DIAGNOSIS — N40.1 BENIGN PROSTATIC HYPERPLASIA WITH URINARY FREQUENCY: Primary | ICD-10-CM

## 2019-04-10 DIAGNOSIS — R35.0 BENIGN PROSTATIC HYPERPLASIA WITH URINARY FREQUENCY: Primary | ICD-10-CM

## 2019-04-10 RX ORDER — DUTASTERIDE 0.5 MG/1
0.5 CAPSULE, LIQUID FILLED ORAL DAILY
Qty: 30 CAP | Refills: 4 | Status: SHIPPED | OUTPATIENT
Start: 2019-04-10 | End: 2020-07-16 | Stop reason: ALTCHOICE

## 2019-04-10 NOTE — TELEPHONE ENCOUNTER
Inbound call from patients wife Clarissa Muñoz. Patients name and  verified. Wife is requesting a referral for a provider closer to this area. Advised I would send a message to the provider. She also stated that his insurance does not cover the Finasteride but they did not give an alternative. Provided her with website [employer] which offers medication at $12.80 for a 30 day supply compared to $80.00 out of pocket.

## 2019-04-10 NOTE — TELEPHONE ENCOUNTER
Referral I did ,was for [facility] and they have several locations. They are at [facility], close to us. Provider I referred comes to both locations. Can request for preferred location,when call to make an appointment.   Will change Finasteride Rx to Avodart  Please let her know  thanks

## 2019-04-10 NOTE — TELEPHONE ENCOUNTER
----- Message from Tyrell Rodrigues sent at 4/9/2019  5:50 PM EDT -----  Regarding: Dr. Luciana Baker  Mrs. Lakisha Willis, pt's wife, requesting to speak with the nurse in regards to medication prescribed at appt scheduled today.   Best contact number [phone]

## 2019-04-10 NOTE — TELEPHONE ENCOUNTER
Returned call to patients wife Arcelia Lundborg. No answer left message on voicemail that I was returning her call.

## 2019-04-10 NOTE — TELEPHONE ENCOUNTER
Call placed to patients wife Cherie Pretty who is on [address] [place] [address]. Patients name and  verified. Provided her with information about urology and advised that RX was changed. She verbalized understanding.

## 2019-04-12 ENCOUNTER — TELEPHONE (OUTPATIENT)
Dept: FAMILY MEDICINE CLINIC | Age: 64
End: 2019-04-12

## 2019-04-12 NOTE — TELEPHONE ENCOUNTER
Call place to patients wife Amrik Turner. Patients name and  verified. Advised wife that we are unable to provide a work note for him missing work today, as we have not seen him for his symptoms. Also advised that without seeing patient for symptoms we are unable to prescribe medication. She inquired about OTC medications provided information. Offered to have patient scheduled with another provider today. She declined. Inquired about Saturday hours, advised wife we do have them for acute visits to contact office in the morning for an appt. She verbalized understanding.

## 2019-08-08 DIAGNOSIS — I10 ESSENTIAL HYPERTENSION: ICD-10-CM

## 2019-08-09 RX ORDER — AMLODIPINE BESYLATE 5 MG/1
TABLET ORAL
Qty: 90 TAB | Refills: 0 | Status: SHIPPED | OUTPATIENT
Start: 2019-08-09 | End: 2020-06-03 | Stop reason: SDUPTHER

## 2019-08-09 NOTE — TELEPHONE ENCOUNTER
He was seen as new patient after ER visit. Started on new blood pressure medicine and was recommended to follow up in 3 months. Last OV 02/2019.    Will send refill but appointment is needed  thanks

## 2019-08-12 NOTE — TELEPHONE ENCOUNTER
Call placed to patient. No answer left message for patient to return call to office. Patient is due for OV PSR can schedule.

## 2019-09-23 DIAGNOSIS — Z12.11 COLON CANCER SCREENING: Primary | ICD-10-CM

## 2019-10-21 NOTE — TELEPHONE ENCOUNTER
Pt.'s wife is calling requesting a call back in regards to get a letter send to his work since he is out of work today. Patient is having a bad sinus infection and would like us to call something in for him.      Best call #[phone]

## 2020-02-21 DIAGNOSIS — M17.11 PRIMARY OSTEOARTHRITIS OF RIGHT KNEE: ICD-10-CM

## 2020-02-21 RX ORDER — MELOXICAM 15 MG/1
TABLET ORAL
Qty: 30 TAB | Refills: 1 | Status: SHIPPED | OUTPATIENT
Start: 2020-02-21 | End: 2020-06-03 | Stop reason: SDUPTHER

## 2020-05-26 ENCOUNTER — TELEPHONE (OUTPATIENT)
Dept: FAMILY MEDICINE CLINIC | Age: 65
End: 2020-05-26

## 2020-05-26 NOTE — TELEPHONE ENCOUNTER
Called, spoke to pt. Wife [employer] pt identifiers confirmed. Appt [facility] for Med Refill 5/27/2020.

## 2020-06-03 ENCOUNTER — VIRTUAL VISIT (OUTPATIENT)
Dept: FAMILY MEDICINE CLINIC | Age: 65
End: 2020-06-03

## 2020-06-03 VITALS — DIASTOLIC BLOOD PRESSURE: 83 MMHG | SYSTOLIC BLOOD PRESSURE: 140 MMHG

## 2020-06-03 DIAGNOSIS — Z12.11 COLON CANCER SCREENING: ICD-10-CM

## 2020-06-03 DIAGNOSIS — I10 ESSENTIAL HYPERTENSION: Primary | ICD-10-CM

## 2020-06-03 DIAGNOSIS — M17.11 PRIMARY OSTEOARTHRITIS OF RIGHT KNEE: ICD-10-CM

## 2020-06-03 DIAGNOSIS — M47.816 ARTHRITIS OF LUMBAR SPINE: ICD-10-CM

## 2020-06-03 RX ORDER — AMLODIPINE BESYLATE 5 MG/1
TABLET ORAL
Qty: 90 TAB | Refills: 0 | Status: SHIPPED | OUTPATIENT
Start: 2020-06-03 | End: 2020-07-16 | Stop reason: SDUPTHER

## 2020-06-03 RX ORDER — MELOXICAM 15 MG/1
TABLET ORAL
Qty: 30 TAB | Refills: 1 | Status: SHIPPED | OUTPATIENT
Start: 2020-06-03 | End: 2020-07-16 | Stop reason: SDUPTHER

## 2020-06-03 NOTE — PROGRESS NOTES
Sridhar Robles is a 62 y.o. male who was seen by synchronous (real-time) audio-video technology on 6/3/2020. Consent: Sridhar Robles, who was seen by synchronous (real-time) audio-video technology, and/or his healthcare decision maker, is aware that this patient-initiated, Telehealth encounter on 6/3/2020 is a billable service, with coverage as determined by his insurance carrier. He is aware that he may receive a bill and has provided verbal consent to proceed: Yes. He has not been in office since 03/2019. Assessment & Plan:   Diagnoses and all orders for this visit:    1. Essential hypertension  -     amLODIPine (NORVASC) 5 mg tablet; TAKE 1 TABLET BY MOUTH DAILY    2. Primary osteoarthritis of right knee  -     meloxicam (MOBIC) 15 mg tablet; TAKE 1 TABLET BY MOUTH EVERY NIGHT    3. Arthritis of lumbar spine    4. Colon cancer screening  -     REFERRAL TO GASTROENTEROLOGY        I spent at least 20 minutes on this visit with this established patient. Subjective:   Sridhar Robles is a 62 y.o. male who was seen for Hypertension    Cardiovascular Review  The patient has hypertension. He reports patient does not perform home BP monitoring, no chest pain on exertion, no dyspnea on exertion, no swelling of ankles, no orthostatic dizziness or lightheadedness, no orthopnea or paroxysmal nocturnal dyspnea, no palpitations, no intermittent claudication symptoms, no erectile dysfunction. Diet and Lifestyle: not attempting to follow a low fat, low cholesterol diet, not attempting to follow a low sodium diet, does not rigorously follow a diabetic diet, exercises sporadically, nonsmoker. Lab review: labs reviewed and discussed with patient. Medicines: Amlodipine 5 mg    Knee Pain  Patient complains of right knee pain and knee swelling. Onset of the symptoms was problem is longstanding. Inciting event: none known.  Current symptoms include pain location: right sided: posterior, severity of pain = moderate, swelling: mild, knee gives out, knee locks, crepitus sensation, foreign body sensation and popping sensation. Associated symptoms: none. Aggravating symptoms: going up and down stairs, standing, walking, rising after sitting. Patient's overall course: symptoms have progressed to a point and plateaued. and course of pain: symptoms have progressed to a point and plateaued. Patient has had no prior knee problems. Patient denies previous knee procedure. Evaluation to date: none. Treatment to date: Meloxicam 15 mg, working well    Prior to Admission medications    Medication Sig Start Date End Date Taking? Authorizing Provider   amLODIPine (NORVASC) 5 mg tablet TAKE 1 TABLET BY MOUTH DAILY 6/3/20  Yes Gardenia Juan MD   meloxicam (MOBIC) 15 mg tablet TAKE 1 TABLET BY MOUTH EVERY NIGHT 6/3/20  Yes Gardenia Juan MD   dutasteride (AVODART) 0.5 mg capsule Take 1 Cap by mouth daily. 4/10/19   Gardenia Juan MD   albuterol (PROVENTIL HFA, VENTOLIN HFA, PROAIR HFA) 90 mcg/actuation inhaler Take 2 Puffs by inhalation every six (6) hours as needed for Wheezing or Shortness of Breath (or cough). 2/2/18   Rubén Fleming NP     Allergies   Allergen Reactions    Prednisone Other (comments)     Hiccups         Patient Active Problem List    Diagnosis Date Noted    Essential hypertension 01/14/2019    Obesity (BMI 30.0-34.9) 01/14/2019    Arthritis of lumbar spine 01/14/2019    Primary osteoarthritis of right knee 01/14/2019     Current Outpatient Medications   Medication Sig Dispense Refill    amLODIPine (NORVASC) 5 mg tablet TAKE 1 TABLET BY MOUTH DAILY 90 Tab 0    meloxicam (MOBIC) 15 mg tablet TAKE 1 TABLET BY MOUTH EVERY NIGHT 30 Tab 1    dutasteride (AVODART) 0.5 mg capsule Take 1 Cap by mouth daily. 30 Cap 4    albuterol (PROVENTIL HFA, VENTOLIN HFA, PROAIR HFA) 90 mcg/actuation inhaler Take 2 Puffs by inhalation every six (6) hours as needed for Wheezing or Shortness of Breath (or cough).  1 Inhaler 0     Allergies Allergen Reactions    Prednisone Other (comments)     Hiccups       Past Medical History:   Diagnosis Date    Hypertension      Past Surgical History:   Procedure Laterality Date    HX ORTHOPAEDIC      Achilles Tendon/ left foot      Family History   Problem Relation Age of Onset    Diabetes Mother     Diabetes Sister     Hypertension Sister     Diabetes Brother     Hypertension Brother      Social History     Tobacco Use    Smoking status: Never Smoker    Smokeless tobacco: Never Used   Substance Use Topics    Alcohol use: No     Frequency: Never       Review of Systems   Constitutional: Negative for chills, fever and malaise/fatigue. HENT: Negative for congestion, ear pain, sore throat and tinnitus. Eyes: Negative for blurred vision, double vision, pain and discharge. Respiratory: Negative for cough, shortness of breath and wheezing. Cardiovascular: Negative for chest pain, palpitations and leg swelling. Gastrointestinal: Negative for abdominal pain, blood in stool, constipation, diarrhea, nausea and vomiting. Genitourinary: Negative for dysuria, frequency, hematuria and urgency. Musculoskeletal: Positive for back pain. Negative for joint pain and myalgias. Right knee pain   Skin: Negative for rash. Neurological: Negative for dizziness, tremors, seizures and headaches. Endo/Heme/Allergies: Negative for polydipsia. Does not bruise/bleed easily. Psychiatric/Behavioral: Negative for depression and substance abuse. The patient is not nervous/anxious.         Objective:   Vital Signs: (As obtained by patient/caregiver at home)  Visit Vitals  /83        [INSTRUCTIONS:  \"[x]\" Indicates a positive item  \"[]\" Indicates a negative item  -- DELETE ALL ITEMS NOT EXAMINED]    Constitutional: [x] Appears well-developed and well-nourished [x] No apparent distress      [] Abnormal -     Mental status: [x] Alert and awake  [x] Oriented to person/place/time [x] Able to follow commands [] Abnormal -     Eyes:   EOM    [x]  Normal    [] Abnormal -   Sclera  [x]  Normal    [] Abnormal -          Discharge [x]  None visible   [] Abnormal -     HENT: [x] Normocephalic, atraumatic  [] Abnormal -   [x] Mouth/Throat: Mucous membranes are moist    External Ears [x] Normal  [] Abnormal -    Neck: [x] No visualized mass [] Abnormal -     Pulmonary/Chest: [x] Respiratory effort normal   [x] No visualized signs of difficulty breathing or respiratory distress        [] Abnormal -      Musculoskeletal:   [x] Normal gait with no signs of ataxia         [x] Normal range of motion of neck        [] Abnormal -     Neurological:        [x] No Facial Asymmetry (Cranial nerve 7 motor function) (limited exam due to video visit)          [x] No gaze palsy        [] Abnormal -          Skin:        [x] No significant exanthematous lesions or discoloration noted on facial skin         [] Abnormal -            Psychiatric:       [x] Normal Affect [] Abnormal -        [x] No Hallucinations    Other pertinent observable physical exam findings:-        We discussed the expected course, resolution and complications of the diagnosis(es) in detail. Medication risks, benefits, costs, interactions, and alternatives were discussed as indicated. I advised him to contact the office if his condition worsens, changes or fails to improve as anticipated. He expressed understanding with the diagnosis(es) and plan. Isra Cohen is a 62 y.o. male who was evaluated by a video visit encounter for concerns as above. Patient identification was verified prior to start of the visit. A caregiver was present when appropriate. Due to this being a TeleHealth encounter (During KIFFX-73 public health emergency), evaluation of the following organ systems was limited: Vitals/Constitutional/EENT/Resp/CV/GI//MS/Neuro/Skin/Heme-Lymph-Imm.   Pursuant to the emergency declaration under the [facility] [address] waiver authority and the [employer] and [employer] Act, this Virtual  Visit was conducted, with patient's (and/or legal guardian's) consent, to reduce the patient's risk of exposure to COVID-19 and provide necessary medical care. Services were provided through a video synchronous discussion virtually to substitute for in-person clinic visit.   This service was provided through telehealth, between patient Fabirc Wright participating from home and Miki Barajas MD from [address] [place] [address]

## 2020-07-16 ENCOUNTER — OFFICE VISIT (OUTPATIENT)
Dept: FAMILY MEDICINE CLINIC | Age: 65
End: 2020-07-16

## 2020-07-16 VITALS
TEMPERATURE: 98 F | HEIGHT: 69 IN | OXYGEN SATURATION: 100 % | SYSTOLIC BLOOD PRESSURE: 126 MMHG | RESPIRATION RATE: 18 BRPM | DIASTOLIC BLOOD PRESSURE: 80 MMHG | HEART RATE: 77 BPM | BODY MASS INDEX: 34.96 KG/M2 | WEIGHT: 236 LBS

## 2020-07-16 DIAGNOSIS — E66.9 OBESITY (BMI 30.0-34.9): ICD-10-CM

## 2020-07-16 DIAGNOSIS — N40.1 BENIGN PROSTATIC HYPERPLASIA WITH URINARY FREQUENCY: ICD-10-CM

## 2020-07-16 DIAGNOSIS — I10 ESSENTIAL HYPERTENSION: ICD-10-CM

## 2020-07-16 DIAGNOSIS — E55.9 VITAMIN D DEFICIENCY: ICD-10-CM

## 2020-07-16 DIAGNOSIS — Z12.11 COLON CANCER SCREENING: ICD-10-CM

## 2020-07-16 DIAGNOSIS — Z00.00 ROUTINE GENERAL MEDICAL EXAMINATION AT A HEALTH CARE FACILITY: Primary | ICD-10-CM

## 2020-07-16 DIAGNOSIS — R35.0 BENIGN PROSTATIC HYPERPLASIA WITH URINARY FREQUENCY: ICD-10-CM

## 2020-07-16 DIAGNOSIS — M17.11 PRIMARY OSTEOARTHRITIS OF RIGHT KNEE: ICD-10-CM

## 2020-07-16 RX ORDER — AMLODIPINE BESYLATE 5 MG/1
TABLET ORAL
Qty: 90 TAB | Refills: 0 | Status: SHIPPED | OUTPATIENT
Start: 2020-07-16 | End: 2020-11-24 | Stop reason: SDUPTHER

## 2020-07-16 RX ORDER — MELOXICAM 15 MG/1
TABLET ORAL
Qty: 30 TAB | Refills: 1 | Status: SHIPPED | OUTPATIENT
Start: 2020-07-16 | End: 2021-12-01 | Stop reason: ALTCHOICE

## 2020-07-16 NOTE — PATIENT INSTRUCTIONS
Knee Arthritis: Exercises  Introduction  Here are some examples of exercises for you to try. The exercises may be suggested for a condition or for rehabilitation. Start each exercise slowly. Ease off the exercises if you start to have pain. You will be told when to start these exercises and which ones will work best for you. How to do the exercises  Knee flexion with heel slide   1. Lie on your back with your knees bent. 2. Slide your heel back by bending your affected knee as far as you can. Then hook your other foot around your ankle to help pull your heel even farther back. 3. Hold for about 6 seconds, then rest for up to 10 seconds. 4. Repeat 8 to 12 times. 5. Switch legs and repeat steps 1 through 4, even if only one knee is sore. Quad sets   1. Sit with your affected leg straight and supported on the floor or a firm bed. Place a small, rolled-up towel under your knee. Your other leg should be bent, with that foot flat on the floor. 2. Tighten the thigh muscles of your affected leg by pressing the back of your knee down into the towel. 3. Hold for about 6 seconds, then rest for up to 10 seconds. 4. Repeat 8 to 12 times. 5. Switch legs and repeat steps 1 through 4, even if only one knee is sore. Straight-leg raises to the front   1. Lie on your back with your good knee bent so that your foot rests flat on the floor. Your affected leg should be straight. Make sure that your low back has a normal curve. You should be able to slip your hand in between the floor and the small of your back, with your palm touching the floor and your back touching the back of your hand. 2. Tighten the thigh muscles in your affected leg by pressing the back of your knee flat down to the floor. Hold your knee straight. 3. Keeping the thigh muscles tight and your leg straight, lift your affected leg up so that your heel is about 12 inches off the floor. Hold for about 6 seconds, then lower slowly.   4. Relax for up to 10 seconds between repetitions. 5. Repeat 8 to 12 times. 6. Switch legs and repeat steps 1 through 5, even if only one knee is sore. Active knee flexion   1. Lie on your stomach with your knees straight. If your kneecap is uncomfortable, roll up a washcloth and put it under your leg just above your kneecap. 2. Lift the foot of your affected leg by bending the knee so that you bring the foot up toward your buttock. If this motion hurts, try it without bending your knee quite as far. This may help you avoid any painful motion. 3. Slowly move your leg up and down. 4. Repeat 8 to 12 times. 5. Switch legs and repeat steps 1 through 4, even if only one knee is sore. Quadriceps stretch (facedown)   1. Lie flat on your stomach, and rest your face on the floor. 2. Wrap a towel or belt strap around the lower part of your affected leg. Then use the towel or belt strap to slowly pull your heel toward your buttock until you feel a stretch. 3. Hold for about 15 to 30 seconds, then relax your leg against the towel or belt strap. 4. Repeat 2 to 4 times. 5. Switch legs and repeat steps 1 through 4, even if only one knee is sore. Stationary exercise bike   1. If you do not have a stationary exercise bike at home, you can find one to ride at your local health club or community center. 2. Adjust the height of the bike seat so that your knee is slightly bent when your leg is extended downward. If your knee hurts when the pedal reaches the top, you can raise the seat so that your knee does not bend as much. 3. Start slowly. At first, try to do 5 to 10 minutes of cycling with little to no resistance. Then increase your time and the resistance bit by bit until you can do 20 to 30 minutes without pain. 4. If you start to have pain, rest your knee until your pain gets back to the level that is normal for you. Or cycle for less time or with less effort. Follow-up care is a key part of your treatment and safety.  Be sure to make and go to all appointments, and call your doctor if you are having problems. It's also a good idea to know your test results and keep a list of the medicines you take. Where can you learn more? Go to http://tr-efraín.info/  Enter C159 in the search box to learn more about \"Knee Arthritis: Exercises. \"  Current as of: March 2, 2020               Content Version: 12.5  © 2006-2020 [employer]. Care instructions adapted under license by [employer] (which disclaims liability or warranty for this information). If you have questions about a medical condition or this instruction, always ask your healthcare professional. [place] [address] any warranty or liability for your use of this information. Well Visit, Men 48 to 72: Care Instructions  Your Care Instructions     Physical exams can help you stay healthy. Your doctor has checked your overall health and may have suggested ways to take good care of yourself. He or she also may have recommended tests. At home, you can help prevent illness with healthy eating, regular exercise, and other steps. Follow-up care is a key part of your treatment and safety. Be sure to make and go to all appointments, and call your doctor if you are having problems. It's also a good idea to know your test results and keep a list of the medicines you take. How can you care for yourself at home? · Reach and stay at a healthy weight. This will lower your risk for many problems, such as obesity, diabetes, heart disease, and high blood pressure. · Get at least 30 minutes of exercise on most days of the week. Walking is a good choice. You also may want to do other activities, such as running, swimming, cycling, or playing tennis or team sports. · Do not smoke. Smoking can make health problems worse. If you need help quitting, talk to your doctor about stop-smoking programs and medicines.  These can increase your chances of quitting for good. · Protect your skin from too much sun. When you're outdoors from 10 a.m. to 4 p.m., stay in the shade or cover up with clothing and a hat with a wide brim. Wear sunglasses that block UV rays. Even when it's cloudy, put broad-spectrum sunscreen (SPF 30 or higher) on any exposed skin. · See a dentist one or two times a year for checkups and to have your teeth cleaned. · Wear a seat belt in the car. Follow your doctor's advice about when to have certain tests. These tests can spot problems early. · Cholesterol. Your doctor will tell you how often to have this done based on your overall health and other things that can increase your risk for heart attack and stroke. · Blood pressure. Have your blood pressure checked during a routine doctor visit. Your doctor will tell you how often to check your blood pressure based on your age, your blood pressure results, and other factors. · Prostate exam. Talk to your doctor about whether you should have a blood test (called a PSA test) for prostate cancer. Experts recommend that you discuss the benefits and risks of the test with your doctor before you decide whether to have this test.  · Diabetes. Ask your doctor whether you should have tests for diabetes. · Vision. Some experts recommend that you have yearly exams for glaucoma and other age-related eye problems starting at age 48. · Hearing. Tell your doctor if you notice any change in your hearing. You can have tests to find out how well you hear. · Colorectal cancer. Your risk for colorectal cancer gets higher as you get older. Some experts say that adults should start regular screening at age 48 and stop at age 76. Others say to start before age 48 or continue after age 76. Talk with your doctor about your risk and when to start and stop screening. · Heart attack and stroke risk. At least every 4 to 6 years, you should have your risk for heart attack and stroke assessed.  Your doctor uses factors such as your age, blood pressure, cholesterol, and whether you smoke or have diabetes to show what your risk for a heart attack or stroke is over the next 10 years. · Abdominal aortic aneurysm. Ask your doctor whether you should have a test to check for an aneurysm. You may need a test if you ever smoked or if your parent, brother, sister, or child has had an aneurysm. When should you call for help? Watch closely for changes in your health, and be sure to contact your doctor if you have any problems or symptoms that concern you. Where can you learn more? Go to http://trAdvanced Cooling Therapyefraín.info/  Enter H487 in the search box to learn more about \"Well Visit, Men 48 to 72: Care Instructions. \"  Current as of: August 22, 2019               Content Version: 12.5  © 7217-5960 [employer]. Care instructions adapted under license by [employer] (which disclaims liability or warranty for this information). If you have questions about a medical condition or this instruction, always ask your healthcare professional. [place] [address] any warranty or liability for your use of this information.

## 2020-07-16 NOTE — PROGRESS NOTES
HISTORY OF PRESENT ILLNESS  Eber Daley is a 62 y.o. male. Seen for annual physical. Has been non compliant with his follow ups,  Last seen 02/2019  He was started on Dutasteride for prostate enlargement, he stopped after few months  HPI  Health Maintenance  Immunizations:    Influenza: he declined. Tetanus: unknown, record requested. Shingles: patient declined. Pneumonia: n/a. Cancer screening:     Colon: reviewed guidelines, referral placed to GI. Prostate: reviewed guidelines, order placed. Cardiovascular Review  The patient has hypertension. He reports patient does not perform home BP monitoring, no chest pain on exertion, no dyspnea on exertion, no swelling of ankles, no orthostatic dizziness or lightheadedness, no orthopnea or paroxysmal nocturnal dyspnea, no palpitations, no intermittent claudication symptoms, no erectile dysfunction. Diet and Lifestyle: not attempting to follow a low fat, low cholesterol diet, not attempting to follow a low sodium diet, does not rigorously follow a diabetic diet, exercises sporadically, nonsmoker. Lab review: labs reviewed and discussed with patient. Medicines:Amlodipine 5 mg  Knee Pain  Patient complains of right knee pain and knee swelling. Onset of the symptoms was problem is longstanding. Inciting event: none known. Current symptoms include pain location: right sided: posterior, severity of pain = moderate, swelling: mild, knee gives out, knee locks, crepitus sensation, foreign body sensation and popping sensation. Associated symptoms: none. Aggravating symptoms: going up and down stairs, standing, walking, rising after sitting. Patient's overall course: symptoms have progressed to a point and plateaued. and course of pain: symptoms have progressed to a point and plateaued. Patient has had no prior knee problems. Patient denies previous knee procedure.  Evaluation to date: evaluated by me last year in 02/19    Treatment to date: Meloxicam    Patient Care Team:  Geoff Cordero MD as PCP - General (Family Practice)  Geoff Cordero MD as PCP - [address] [place] [address] [facility]  Ki Santiago RN       The following sections were reviewed & updated as appropriate: PMH, PSH, FH, and SH. Review of Systems   Constitutional: Negative for chills, fever and malaise/fatigue. HENT: Negative for congestion, ear pain, sore throat and tinnitus. Eyes: Negative for blurred vision, double vision, pain and discharge. Respiratory: Negative for cough, shortness of breath and wheezing. Cardiovascular: Negative for chest pain, palpitations and leg swelling. Gastrointestinal: Negative for abdominal pain, blood in stool, constipation, diarrhea, nausea and vomiting. Genitourinary: Negative for dysuria, frequency, hematuria and urgency. Musculoskeletal: Negative for back pain, joint pain and myalgias. Right knee pain   Skin: Negative for rash. Neurological: Negative for dizziness, tremors, seizures and headaches. Endo/Heme/Allergies: Negative for polydipsia. Does not bruise/bleed easily. Psychiatric/Behavioral: Negative for depression and substance abuse. The patient is not nervous/anxious. Physical Exam  Vitals signs and nursing note reviewed. Constitutional:       Appearance: He is well-developed. HENT:      Head: Normocephalic and atraumatic. Right Ear: External ear normal.      Left Ear: External ear normal.      Nose: Nose normal.      Mouth/Throat:      Pharynx: No oropharyngeal exudate. Eyes:      Conjunctiva/sclera: Conjunctivae normal.      Pupils: Pupils are equal, round, and reactive to light. Neck:      Musculoskeletal: Normal range of motion and neck supple. Thyroid: No thyromegaly. Cardiovascular:      Rate and Rhythm: Normal rate and regular rhythm. Heart sounds: Normal heart sounds. No murmur. Pulmonary:      Effort: Pulmonary effort is normal. No respiratory distress.       Breath sounds: Normal breath sounds. No wheezing. Abdominal:      General: Bowel sounds are normal. There is no distension. Palpations: Abdomen is soft. There is no mass. Genitourinary:     Penis: Normal.       Scrotum/Testes: Normal.         Right: Mass not present. Left: Mass not present. Musculoskeletal:      Right knee: He exhibits decreased range of motion. He exhibits no swelling and no effusion. Tenderness found. Medial joint line tenderness noted. Skin:     General: Skin is warm and dry. Findings: No rash. Neurological:      Mental Status: He is alert and oriented to person, place, and time. Sensory: No sensory deficit. Deep Tendon Reflexes: Reflexes are normal and symmetric. Comments: Cranial nerves 2-12 normal   Psychiatric:         Behavior: Behavior normal.         Thought Content: Thought content normal.         ASSESSMENT and PLAN  Diagnoses and all orders for this visit:    1. Routine general medical examination at a health care facility  -     CBC WITH AUTOMATED DIFF  -     METABOLIC PANEL, COMPREHENSIVE  -     PSA W/ REFLX FREE PSA  -     HEMOGLOBIN A1C WITH EAG  -     TSH REFLEX TO T4  -     URINALYSIS W/ RFLX MICROSCOPIC  -     LIPID PANEL    2. Essential hypertension  -     amLODIPine (NORVASC) 5 mg tablet; TAKE 1 TABLET BY MOUTH DAILY    3. Benign prostatic hyperplasia with urinary frequency  -     PSA W/ REFLX FREE PSA    4. Vitamin D deficiency  -     VITAMIN D, 25 HYDROXY    5. Obesity (BMI 30.0-34. 9)  Assessment & Plan:  Worsening. Discussed abnormal weight, ideal BMI and importance of diet and exercise to loose weight. Referral for exercise program was offered. Printed information about diet and lifestyle modification provided.          6. Colon cancer screening  -     REFERRAL TO GASTROENTEROLOGY    7. Primary osteoarthritis of right knee  -     meloxicam (MOBIC) 15 mg tablet; TAKE 1 TABLET BY MOUTH EVERY NIGHT    Discussed lifestyle issues and health guidance given  Patient was given an after visit summary which includes diagnoses, vital signs, current medications, instructions and references & authorized prescriptions . Results of labs will be conveyed to patient, once available. Pt verbalized instructions I provided and expressed understanding of discussion that was held today.

## 2020-07-16 NOTE — PROGRESS NOTES
Chief Complaint   Patient presents with    Complete Physical     Fasting     1. Have you been to the ER, urgent care clinic since your last visit? Hospitalized since your last visit? No    2. Have you seen or consulted any other health care providers outside of the [address] [place] [address] since your last visit? Include any pap smears or colon screening.  No

## 2020-07-17 DIAGNOSIS — R35.0 BENIGN PROSTATIC HYPERPLASIA WITH URINARY FREQUENCY: ICD-10-CM

## 2020-07-17 DIAGNOSIS — N40.1 BENIGN PROSTATIC HYPERPLASIA WITH URINARY FREQUENCY: ICD-10-CM

## 2020-07-17 LAB
25(OH)D3+25(OH)D2 SERPL-MCNC: 17.8 NG/ML (ref 30–100)
ALBUMIN SERPL-MCNC: 4.3 G/DL (ref 3.8–4.8)
ALBUMIN/GLOB SERPL: 1.3 {RATIO} (ref 1.2–2.2)
ALP SERPL-CCNC: 58 IU/L (ref 39–117)
ALT SERPL-CCNC: 38 IU/L (ref 0–44)
APPEARANCE UR: CLEAR
AST SERPL-CCNC: 33 IU/L (ref 0–40)
BASOPHILS # BLD AUTO: 0.1 X10E3/UL (ref 0–0.2)
BASOPHILS NFR BLD AUTO: 1 %
BILIRUB SERPL-MCNC: 0.4 MG/DL (ref 0–1.2)
BILIRUB UR QL STRIP: NEGATIVE
BUN SERPL-MCNC: 18 MG/DL (ref 8–27)
BUN/CREAT SERPL: 19 (ref 10–24)
CALCIUM SERPL-MCNC: 9.2 MG/DL (ref 8.6–10.2)
CHLORIDE SERPL-SCNC: 104 MMOL/L (ref 96–106)
CHOLEST SERPL-MCNC: 182 MG/DL (ref 100–199)
CO2 SERPL-SCNC: 20 MMOL/L (ref 20–29)
COLOR UR: YELLOW
CREAT SERPL-MCNC: 0.96 MG/DL (ref 0.76–1.27)
EOSINOPHIL # BLD AUTO: 0.3 X10E3/UL (ref 0–0.4)
EOSINOPHIL NFR BLD AUTO: 6 %
ERYTHROCYTE [DISTWIDTH] IN BLOOD BY AUTOMATED COUNT: 13.8 % (ref 11.6–15.4)
EST. AVERAGE GLUCOSE BLD GHB EST-MCNC: 140 MG/DL
GLOBULIN SER CALC-MCNC: 3.3 G/DL (ref 1.5–4.5)
GLUCOSE SERPL-MCNC: 110 MG/DL (ref 65–99)
GLUCOSE UR QL: NEGATIVE
HBA1C MFR BLD: 6.5 % (ref 4.8–5.6)
HCT VFR BLD AUTO: 44.1 % (ref 37.5–51)
HDLC SERPL-MCNC: 46 MG/DL
HGB BLD-MCNC: 15.8 G/DL (ref 13–17.7)
HGB UR QL STRIP: NEGATIVE
IMM GRANULOCYTES # BLD AUTO: 0 X10E3/UL (ref 0–0.1)
IMM GRANULOCYTES NFR BLD AUTO: 0 %
INTERPRETATION, 910389: NORMAL
KETONES UR QL STRIP: NEGATIVE
LDLC SERPL CALC-MCNC: 120 MG/DL (ref 0–99)
LEUKOCYTE ESTERASE UR QL STRIP: NEGATIVE
LYMPHOCYTES # BLD AUTO: 2.8 X10E3/UL (ref 0.7–3.1)
LYMPHOCYTES NFR BLD AUTO: 45 %
MCH RBC QN AUTO: 32.4 PG (ref 26.6–33)
MCHC RBC AUTO-ENTMCNC: 35.8 G/DL (ref 31.5–35.7)
MCV RBC AUTO: 91 FL (ref 79–97)
MICRO URNS: NORMAL
MONOCYTES # BLD AUTO: 0.5 X10E3/UL (ref 0.1–0.9)
MONOCYTES NFR BLD AUTO: 7 %
NEUTROPHILS # BLD AUTO: 2.6 X10E3/UL (ref 1.4–7)
NEUTROPHILS NFR BLD AUTO: 41 %
NITRITE UR QL STRIP: NEGATIVE
PH UR STRIP: 5 [PH] (ref 5–7.5)
PLATELET # BLD AUTO: 164 X10E3/UL (ref 150–450)
POTASSIUM SERPL-SCNC: 4.4 MMOL/L (ref 3.5–5.2)
PROT SERPL-MCNC: 7.6 G/DL (ref 6–8.5)
PROT UR QL STRIP: NEGATIVE
PSA FREE MFR SERPL: 23.5 %
PSA FREE SERPL-MCNC: 1.13 NG/ML
PSA SERPL-MCNC: 4.8 NG/ML (ref 0–4)
RBC # BLD AUTO: 4.87 X10E6/UL (ref 4.14–5.8)
REFLEX CRITERIA: ABNORMAL
SODIUM SERPL-SCNC: 142 MMOL/L (ref 134–144)
SP GR UR: 1.02 (ref 1–1.03)
TRIGL SERPL-MCNC: 79 MG/DL (ref 0–149)
TSH SERPL DL<=0.005 MIU/L-ACNC: 0.64 UIU/ML (ref 0.45–4.5)
UROBILINOGEN UR STRIP-MCNC: 0.2 MG/DL (ref 0.2–1)
VLDLC SERPL CALC-MCNC: 16 MG/DL (ref 5–40)
WBC # BLD AUTO: 6.2 X10E3/UL (ref 3.4–10.8)

## 2020-07-17 RX ORDER — METFORMIN HYDROCHLORIDE 500 MG/1
500 TABLET ORAL 2 TIMES DAILY WITH MEALS
Qty: 60 TAB | Refills: 3 | Status: SHIPPED | OUTPATIENT
Start: 2020-07-17 | End: 2021-05-19 | Stop reason: ALTCHOICE

## 2020-07-17 RX ORDER — DUTASTERIDE 0.5 MG/1
0.5 CAPSULE, LIQUID FILLED ORAL DAILY
Qty: 30 CAP | Refills: 4 | Status: SHIPPED | OUTPATIENT
Start: 2020-07-17 | End: 2021-05-19 | Stop reason: SDUPTHER

## 2020-07-17 NOTE — PROGRESS NOTES
Please inform patient and schedule 4 months follow up,  Average sugar is in diabetic range now. Recommend to start medicine along with strict diet control and exercise. Will send Rx to pharmacy, to take one tablet two times with meals  Blood count, kidney,liver,thyroid ,urine results are normal  PSA is up but has improved, so it confirms that is is from prostate enlargement.  Will resume his prostate medicine  Cholesterol is ok  Vitamin d is very low, to take OTC vitamin d3 2000 units daily  thanks

## 2020-08-07 ENCOUNTER — TELEPHONE (OUTPATIENT)
Dept: FAMILY MEDICINE CLINIC | Age: 65
End: 2020-08-07

## 2020-08-07 NOTE — TELEPHONE ENCOUNTER
Ask him to take medicine with dinner only. Most of time initial GI upset with Metformin gets bettrer with time.  Amelia Galvez is preferred and safe medicine for his age and his sugar readings  Let wife know  thanks

## 2020-08-07 NOTE — TELEPHONE ENCOUNTER
Pt's wife Luiz Espinosa called to let Dr. Tae Blue know that the diabetes meds she has prescribed is not working with the pt. Please give her a call.     [facility]: [phone]

## 2020-08-07 NOTE — TELEPHONE ENCOUNTER
Patient just got diagnosis 2 weeks before and medicine was just started. He has borderline diabetes ( A1C 6.5) and Metformin is the medicine to start with. Unless we check sugar in 3 months, we can not say medicine is not working from daily sugar check.  He needs to watch diet strictly, exercise daily along with medicine  Please let wife know

## 2020-08-07 NOTE — TELEPHONE ENCOUNTER
Spoke with patient Felecia Khan he states Wife , worded wrong and states the medication is working but it's just upsetting his stomach. He states that he doesn't want to keep going to the bathroom while he's at work.

## 2020-08-18 ENCOUNTER — OFFICE VISIT (OUTPATIENT)
Dept: URGENT CARE | Age: 65
End: 2020-08-18
Payer: COMMERCIAL

## 2020-08-18 ENCOUNTER — TELEPHONE (OUTPATIENT)
Dept: FAMILY MEDICINE CLINIC | Age: 65
End: 2020-08-18

## 2020-08-18 VITALS — OXYGEN SATURATION: 97 % | TEMPERATURE: 98.4 F | RESPIRATION RATE: 15 BRPM | HEART RATE: 62 BPM

## 2020-08-18 DIAGNOSIS — Z20.822 EXPOSURE TO COVID-19 VIRUS: Primary | ICD-10-CM

## 2020-08-18 DIAGNOSIS — E11.9 NEW ONSET TYPE 2 DIABETES MELLITUS (HCC): Primary | ICD-10-CM

## 2020-08-18 PROCEDURE — 99203 OFFICE O/P NEW LOW 30 MIN: CPT | Performed by: FAMILY MEDICINE

## 2020-08-18 RX ORDER — GLIMEPIRIDE 1 MG/1
1 TABLET ORAL
Qty: 30 TAB | Refills: 3 | Status: SHIPPED | OUTPATIENT
Start: 2020-08-18 | End: 2021-09-01

## 2020-08-18 NOTE — TELEPHONE ENCOUNTER
----- Message from Gato Holland sent at 8/18/2020  9:27 AM EDT -----  Regarding: Dr. [facility]: [phone]  Caller's first and last name: Camilla Marie (pts wife)   Reason for call: Requesting new diabetes medication be prescribed to pt. Callback required yes/no and why: yes  Best contact number(s): [phone]  Details to clarify the request: Current diabetes medication is causing pt to have diarrhea.

## 2020-08-18 NOTE — PROGRESS NOTES
This patient was seen in [facility] Clinic at [address] [place] [address] Urgent Care while in their vehicle due to COVID-19 pandemic with PPE and focused examination in order to decrease community viral transmission. The patient/guardian gave verbal consent to treat. Gwen Garcia is a 62 y.o. male who presents for COVID-19 testing. Was exposed to COVID-19 by co-worker 2 weeks ago. Needs negative COVID-19 test in order to return to work. Denies cough, fever, SOB. PMH: HTN, DM2. Non-smoker.              Past Medical History:   Diagnosis Date    Hypertension         Past Surgical History:   Procedure Laterality Date    HX ORTHOPAEDIC      Achilles Tendon/ left foot          Family History   Problem Relation Age of Onset    Diabetes Mother     Diabetes Sister     Hypertension Sister     Diabetes Brother     Hypertension Brother         Social History     Socioeconomic History    Marital status:      Spouse name: Not on file    Number of children: Not on file    Years of education: Not on file    Highest education level: Not on file   Occupational History    Not on file   Social Needs    Financial resource strain: Not on file    Food insecurity     Worry: Not on file     Inability: Not on file    Transportation needs     Medical: Not on file     Non-medical: Not on file   Tobacco Use    Smoking status: Never Smoker    Smokeless tobacco: Never Used   Substance and Sexual Activity    Alcohol use: No     Frequency: Never    Drug use: No    Sexual activity: Yes   Lifestyle    Physical activity     Days per week: Not on file     Minutes per session: Not on file    Stress: Not on file   Relationships    Social connections     Talks on phone: Not on file     Gets together: Not on file     Attends Jew service: Not on file     Active member of club or organization: Not on file     Attends meetings of clubs or organizations: Not on file     Relationship status: Not on file    Intimate partner violence Fear of current or ex partner: Not on file     Emotionally abused: Not on file     Physically abused: Not on file     Forced sexual activity: Not on file   Other Topics Concern    Not on file   Social History Narrative    Not on file                ALLERGIES: Prednisone    Review of Systems   Constitutional: Negative for activity change, appetite change, chills and fever. HENT: Negative for congestion, rhinorrhea and sore throat. Respiratory: Negative for cough, shortness of breath and wheezing. Cardiovascular: Negative for chest pain. Gastrointestinal: Negative for abdominal pain, diarrhea, nausea and vomiting. Musculoskeletal: Negative for myalgias. Neurological: Negative for headaches. Vitals:    08/18/20 1931   Pulse: 62   Resp: 15   Temp: 98.4 °F (36.9 °C)   SpO2: 97%       Physical Exam  Vitals signs and nursing note reviewed. Constitutional:       General: He is not in acute distress. Appearance: He is well-developed. He is not diaphoretic. Pulmonary:      Effort: Pulmonary effort is normal. No respiratory distress. Breath sounds: Normal breath sounds. No stridor. No wheezing, rhonchi or rales. Neurological:      Mental Status: He is alert. Psychiatric:         Behavior: Behavior normal.         Thought Content: Thought content normal.         Judgment: Judgment normal.         MDM    ICD-10-CM ICD-9-CM   1.  Exposure to COVID-19 virus  Z20.828 V01.79       Orders Placed This Encounter    NOVEL CORONAVIRUS (COVID-19)     Scheduling Instructions:      1) Due to current limited availability of the COVID-19 PCR test, tests will be prioritized and may not be completed.              2) Order only if the test result will change clinical management or necessary for a return to mission-critical employment decision.              3) Print and instruct patient to adhere to CDC home isolation program. (Link Above)              4) Set up or refer patient for a monitoring program.              5) Have patient sign up for and leverage MyChart (if not previously done). Order Specific Question:   Is this test for diagnosis or screening? Answer:   Screening     Order Specific Question:   Symptomatic for COVID-19 as defined by CDC? Answer:   No     Order Specific Question:   Hospitalized for COVID-19? Answer:   No     Order Specific Question:   Admitted to ICU for COVID-19? Answer:   No     Order Specific Question:   Employed in healthcare setting? Answer:   No     Order Specific Question:   Resident in a congregate (group) care setting? Answer:   No     Order Specific Question:   Previously tested for COVID-19? Answer:   No      Await test results    If signs and symptoms become worse the pt is to go to the ER.          Procedures

## 2020-08-18 NOTE — TELEPHONE ENCOUNTER
Will change to alternate Rx, Glimepiride.   Please let wife know that needs to stop Metformin and switch to new one, and to take one time before breakfast  thanks

## 2020-08-18 NOTE — LETTER
August 18, 2020 Janet Yao [address] [facility] [address] Dear Emil Mon: Thank you for requesting access to Think[employer]. Please follow the instructions below to securely access and download your online medical record. ThinkUp allows you to send messages to your doctor, view your test results, renew your prescriptions, schedule appointments, and more. How Do I Sign Up? 1. In your internet browser, go to [email or web address]. 2. Click on the First Time User? Click Here link in the Sign In box. You will see the New Member Sign Up page. 3. Enter your ThinkUp Access Code exactly as it appears below. You will not need to use this code after youve completed the sign-up process. If you do not sign up before the expiration date, you must request a new code. ThinkUp Access Code: [email or web address] Expires: 10/2/2020  7:21 PM  
 
4. Enter the last four digits of your Social Security Number (xxxx) and Date of Birth (mm/dd/yyyy) as indicated and click Submit. You will be taken to the next sign-up page. 5. Create a ThinkUp ID. This will be your ThinkUp login ID and cannot be changed, so think of one that is secure and easy to remember. 6. Create a ThinkUp password. You can change your password at any time. 7. Enter your Password Reset Question and Answer. This can be used at a later time if you forget your password. 8. Enter your e-mail address. You will receive e-mail notification when new information is available in [address]. 9. Click Sign Up. You can now view and download portions of your medical record. 10. Click the Download Summary menu link to download a portable copy of your medical information. Additional Information If you have questions, please visit the Frequently Asked Questions section of the ThinkUp website at [email or web address] Remember, ThinkUp is NOT to be used for urgent needs. For medical emergencies, dial 911. Now available from your iPhone and Android! Sincerely, [employer]

## 2020-08-21 LAB — SARS-COV-2, NAA: NOT DETECTED

## 2020-11-24 DIAGNOSIS — I10 ESSENTIAL HYPERTENSION: ICD-10-CM

## 2020-11-24 RX ORDER — AMLODIPINE BESYLATE 5 MG/1
5 TABLET ORAL DAILY
Qty: 90 TAB | Refills: 0 | Status: SHIPPED | OUTPATIENT
Start: 2020-11-24 | End: 2021-05-09

## 2020-11-24 NOTE — TELEPHONE ENCOUNTER
Last visit 07/16/2020 MD Rizwana Flores   Next appointment 11/30/2020 MD Rizwana Flores   Previous refill encounter(s)   07/16/2020 Norvasc #90     Requested Prescriptions     Pending Prescriptions Disp Refills    amLODIPine (NORVASC) 5 mg tablet 90 Tab 0     Sig: Take 1 Tab by mouth daily.  TAKE 1 TABLET BY MOUTH DAILY

## 2021-04-14 ENCOUNTER — IMMUNIZATION (OUTPATIENT)
Dept: FAMILY MEDICINE CLINIC | Age: 66
End: 2021-04-14

## 2021-04-14 DIAGNOSIS — Z23 ENCOUNTER FOR IMMUNIZATION: Primary | ICD-10-CM

## 2021-04-14 PROCEDURE — 91300 COVID-19, MRNA, LNP-S, PF, 30MCG/0.3ML DOSE(PFIZER): CPT

## 2021-04-14 PROCEDURE — 0001A COVID-19, MRNA, LNP-S, PF, 30MCG/0.3ML DOSE(PFIZER): CPT

## 2021-05-05 ENCOUNTER — IMMUNIZATION (OUTPATIENT)
Dept: FAMILY MEDICINE CLINIC | Age: 66
End: 2021-05-05

## 2021-05-05 DIAGNOSIS — Z23 ENCOUNTER FOR IMMUNIZATION: Primary | ICD-10-CM

## 2021-05-05 PROCEDURE — 91300 COVID-19, MRNA, LNP-S, PF, 30MCG/0.3ML DOSE(PFIZER): CPT

## 2021-05-05 PROCEDURE — 0002A COVID-19, MRNA, LNP-S, PF, 30MCG/0.3ML DOSE(PFIZER): CPT

## 2021-05-09 DIAGNOSIS — I10 ESSENTIAL HYPERTENSION: ICD-10-CM

## 2021-05-09 RX ORDER — AMLODIPINE BESYLATE 5 MG/1
TABLET ORAL
Qty: 15 TAB | Refills: 0 | Status: SHIPPED | OUTPATIENT
Start: 2021-05-09 | End: 2021-05-19 | Stop reason: DRUGHIGH

## 2021-05-09 NOTE — TELEPHONE ENCOUNTER
Last appointment 07/20. His blood work showed diabetes and was started on new medicines. He is also on new blood pressure medicines. He was instructed to follow up in 3 months. Far due for an appointment. Will send 15 days Rx only. No further refills till he has an appointment and blood work.

## 2021-05-10 NOTE — TELEPHONE ENCOUNTER
Called patient. spoet to wife Kate Gamboa as per PHI. Name and  verified.  Scheduled for 21 at 2:20pm

## 2021-05-19 ENCOUNTER — OFFICE VISIT (OUTPATIENT)
Dept: FAMILY MEDICINE CLINIC | Age: 66
End: 2021-05-19
Payer: COMMERCIAL

## 2021-05-19 VITALS
TEMPERATURE: 98.6 F | BODY MASS INDEX: 34.21 KG/M2 | RESPIRATION RATE: 16 BRPM | SYSTOLIC BLOOD PRESSURE: 140 MMHG | HEIGHT: 69 IN | OXYGEN SATURATION: 97 % | DIASTOLIC BLOOD PRESSURE: 82 MMHG | HEART RATE: 68 BPM | WEIGHT: 231 LBS

## 2021-05-19 DIAGNOSIS — R35.1 BENIGN PROSTATIC HYPERPLASIA WITH NOCTURIA: ICD-10-CM

## 2021-05-19 DIAGNOSIS — E11.9 NEW ONSET TYPE 2 DIABETES MELLITUS (HCC): Primary | ICD-10-CM

## 2021-05-19 DIAGNOSIS — N40.1 BENIGN PROSTATIC HYPERPLASIA WITH NOCTURIA: ICD-10-CM

## 2021-05-19 DIAGNOSIS — M17.11 PRIMARY OSTEOARTHRITIS OF RIGHT KNEE: ICD-10-CM

## 2021-05-19 DIAGNOSIS — I10 ESSENTIAL HYPERTENSION: ICD-10-CM

## 2021-05-19 DIAGNOSIS — E55.9 VITAMIN D DEFICIENCY: ICD-10-CM

## 2021-05-19 PROCEDURE — 99214 OFFICE O/P EST MOD 30 MIN: CPT | Performed by: FAMILY MEDICINE

## 2021-05-19 RX ORDER — AMLODIPINE BESYLATE 10 MG/1
10 TABLET ORAL DAILY
Qty: 90 TABLET | Refills: 0 | Status: SHIPPED | OUTPATIENT
Start: 2021-05-19 | End: 2021-07-25 | Stop reason: SDUPTHER

## 2021-05-19 RX ORDER — DUTASTERIDE 0.5 MG/1
0.5 CAPSULE, LIQUID FILLED ORAL DAILY
Qty: 30 CAPSULE | Refills: 4 | Status: SHIPPED | OUTPATIENT
Start: 2021-05-19 | End: 2021-12-03 | Stop reason: SDUPTHER

## 2021-05-19 NOTE — ASSESSMENT & PLAN NOTE
well controlled, continue current medications pending work up below, medication adherence emphasized, lifestyle modifications recommended

## 2021-05-19 NOTE — PROGRESS NOTES
Chief Complaint   Patient presents with    Hypertension     follow up       1. Have you been to the ER, urgent care clinic since your last visit? Hospitalized since your last visit? No    2. Have you seen or consulted any other health care providers outside of the [address] [place] [address] since your last visit? Include any pap smears or colon screening. No    3 most recent PHQ Screens 5/19/2021   Little interest or pleasure in doing things Not at all   Feeling down, depressed, irritable, or hopeless Not at all   Total Score PHQ 2 0       Abuse Screening Questionnaire 5/19/2021   Do you ever feel afraid of your partner? N   Are you in a relationship with someone who physically or mentally threatens you? N   Is it safe for you to go home? Y       ADL Assessment 5/19/2021   Feeding yourself No Help Needed   Getting from bed to chair No Help Needed   Getting dressed No Help Needed   Bathing or showering No Help Needed   Walk across the room (includes cane/walker) No Help Needed   Using the telphone No Help Needed   Taking your medications No Help Needed   Preparing meals No Help Needed   Managing money (expenses/bills) No Help Needed   Moderately strenuous housework (laundry) No Help Needed   Shopping for personal items (toiletries/medicines) No Help Needed   Shopping for groceries No Help Needed   Driving No Help Needed   Climbing a flight of stairs No Help Needed   Getting to places beyond walking distances No Help Needed       Fall Risk Assessment, last 12 mths 2/20/2019   Able to walk? Yes   Fall in past 12 months?  No         Health Maintenance Due   Topic Date Due    Foot Exam Q1  Never done    MICROALBUMIN Q1  Never done    Eye Exam Retinal or Dilated  Never done    DTaP/Tdap/Td series (1 - Tdap) Never done    Shingrix Vaccine Age 50> (1 of 2) Never done    Colorectal Cancer Screening Combo  Never done    Medicare Yearly Exam  Never done         Pt didn't have an eye exam or colonoscopy recently

## 2021-05-19 NOTE — PATIENT INSTRUCTIONS
High Blood Pressure: Care Instructions Overview It's normal for blood pressure to go up and down throughout the day. But if it stays up, you have high blood pressure. Another name for high blood pressure is hypertension. Despite what a lot of people think, high blood pressure usually doesn't cause headaches or make you feel dizzy or lightheaded. It usually has no symptoms. But it does increase your risk of stroke, heart attack, and other problems. You and your doctor will talk about your risks of these problems based on your blood pressure. Your doctor will give you a goal for your blood pressure. Your goal will be based on your health and your age. Lifestyle changes, such as eating healthy and being active, are always important to help lower blood pressure. You might also take medicine to reach your blood pressure goal. 
Follow-up care is a key part of your treatment and safety. Be sure to make and go to all appointments, and call your doctor if you are having problems. It's also a good idea to know your test results and keep a list of the medicines you take. How can you care for yourself at home? Medical treatment · If you stop taking your medicine, your blood pressure will go back up. You may take one or more types of medicine to lower your blood pressure. Be safe with medicines. Take your medicine exactly as prescribed. Call your doctor if you think you are having a problem with your medicine. · Talk to your doctor before you start taking aspirin every day. Aspirin can help certain people lower their risk of a heart attack or stroke. But taking aspirin isn't right for everyone, because it can cause serious bleeding. · See your doctor regularly. You may need to see the doctor more often at first or until your blood pressure comes down. · If you are taking blood pressure medicine, talk to your doctor before you take decongestants or anti-inflammatory medicine, such as ibuprofen.  Some of these medicines can raise blood pressure. · Learn how to check your blood pressure at home. Lifestyle changes · Stay at a healthy weight. This is especially important if you put on weight around the waist. Losing even 10 pounds can help you lower your blood pressure. · If your doctor recommends it, get more exercise. Walking is a good choice. Bit by bit, increase the amount you walk every day. Try for at least 30 minutes on most days of the week. You also may want to swim, bike, or do other activities. · Avoid or limit alcohol. Talk to your doctor about whether you can drink any alcohol. · Try to limit how much sodium you eat to less than 2,300 milligrams (mg) a day. Your doctor may ask you to try to eat less than 1,500 mg a day. · Eat plenty of fruits (such as bananas and oranges), vegetables, legumes, whole grains, and low-fat dairy products. · Lower the amount of saturated fat in your diet. Saturated fat is found in animal products such as milk, cheese, and meat. Limiting these foods may help you lose weight and also lower your risk for heart disease. · Do not smoke. Smoking increases your risk for heart attack and stroke. If you need help quitting, talk to your doctor about stop-smoking programs and medicines. These can increase your chances of quitting for good. When should you call for help? Call  911 anytime you think you may need emergency care. This may mean having symptoms that suggest that your blood pressure is causing a serious heart or blood vessel problem. Your blood pressure may be over 180/120. For example, call 911 if: 
  · You have symptoms of a heart attack. These may include: 
? Chest pain or pressure, or a strange feeling in the chest. 
? Sweating. ? Shortness of breath. ? Nausea or vomiting. ? Pain, pressure, or a strange feeling in the back, neck, jaw, or upper belly or in one or both shoulders or arms. ? Lightheadedness or sudden weakness.  
? A fast or irregular heartbeat.  
  · You have symptoms of a stroke. These may include: 
? Sudden numbness, tingling, weakness, or loss of movement in your face, arm, or leg, especially on only one side of your body. ? Sudden vision changes. ? Sudden trouble speaking. ? Sudden confusion or trouble understanding simple statements. ? Sudden problems with walking or balance. ? A sudden, severe headache that is different from past headaches.  
  · You have severe back or belly pain. Do not wait until your blood pressure comes down on its own. Get help right away. Call your doctor now or seek immediate care if: 
  · Your blood pressure is much higher than normal (such as 180/120 or higher), but you don't have symptoms.  
  · You think high blood pressure is causing symptoms, such as: 
? Severe headache. 
? Blurry vision. Watch closely for changes in your health, and be sure to contact your doctor if: 
  · Your blood pressure measures higher than your doctor recommends at least 2 times. That means the top number is higher or the bottom number is higher, or both.  
  · You think you may be having side effects from your blood pressure medicine. Where can you learn more? Go to http://www.gray.com/ Enter W279 in the search box to learn more about \"High Blood Pressure: Care Instructions. \" Current as of: August 31, 2020               Content Version: 12.8 © 2006-2021 [employer]. Care instructions adapted under license by [employer] (which disclaims liability or warranty for this information). If you have questions about a medical condition or this instruction, always ask your healthcare professional. [place] [address] any warranty or liability for your use of this information.

## 2021-05-19 NOTE — LETTER
NOTIFICATION RETURN TO WORK  
 
5/19/2021 3:31 PM 
 
Mr. Hazel Lloyd [address] [facility] [address] To Whom It May Concern: 
 
Hazel Lloyd is currently under the care of [facility]. He was seen in office today and will return to work/school on: 05/20/21 If there are questions or concerns please have the patient contact our office. Sincerely, Andrea Avila MD

## 2021-05-19 NOTE — PROGRESS NOTES
HISTORY OF PRESENT ILLNESS  Kaylynn Jones is a 60 y.o. male. Patient was seen today for follow-up on newly diagnosed diabetes, hypertension, BPH. History of extreme noncompliance with follow-ups and medications. HPI  Endocrine Review  He is seen for diabetes and obesity. Testing: is not performed. He reports medication compliance: Extremely noncompliant as she did not take medicine at all that was prescribed. Medication side effects: yes  Diabetic diet compliance: noncompliant some of the time. He was prescribed with Metformin after his last blood work report in July. He had side effects like diarrhea and abdominal cramps and medication was changed to glimepiride that he did not . Lab Results   Component Value Date/Time    Hemoglobin A1c 6.5 (H) 07/16/2020 12:03 PM        Cardiovascular Review  The patient has hypertension. He reports taking medications as instructed, no medication side effects noted, home BP monitoring in range of 1 58-0 51H'B systolic over 26F'J diastolic, no chest pain on exertion, no dyspnea on exertion, no swelling of ankles, no orthostatic dizziness or lightheadedness, no orthopnea or paroxysmal nocturnal dyspnea, no palpitations, no intermittent claudication symptoms. Diet and Lifestyle: generally follows a low fat low cholesterol diet, generally follows a low sodium diet, sedentary, nonsmoker, According to him he walks a lot at work. Lab review: labs reviewed and discussed with patient. Medicines: Amlodipine 5 mg    Urology Review  He is here today because of BPH. He reports his symptoms are reasonably well controlled. His symptoms include: nocturia x 3. He denies: urinary hesitancy, urinary frequency, incomplete voiding, double voiding, weak stream, perineal discomfort, dysuria, hematuria, abdominal pain, flank pain, testicular pain. He is on the following medications: Avodart (dutasteride). He reports the following medication side effects: none.   He stopped his medication for last few months. Lab Results   Component Value Date/Time    Prostate Specific Ag 4.8 (H) 07/16/2020 12:03 PM    Prostate Specific Ag 6.6 (H) 04/06/2019 09:09 AM    Prostate Specific Ag 4.4 (H) 02/06/2018 09:14 AM    % Free PSA 23.5 07/16/2020 12:03 PM    % Free PSA 21.8 04/06/2019 09:09 AM         Review of Systems   Constitutional: Negative for chills, fever and malaise/fatigue. HENT: Negative for congestion, ear pain, sore throat and tinnitus. Eyes: Negative for blurred vision, double vision, pain and discharge. Respiratory: Negative for cough, shortness of breath and wheezing. Cardiovascular: Negative for chest pain, palpitations and leg swelling. Gastrointestinal: Negative for abdominal pain, blood in stool, constipation, diarrhea, nausea and vomiting. Genitourinary: Negative for dysuria, frequency, hematuria and urgency. Musculoskeletal: Negative for back pain, joint pain and myalgias. Skin: Negative for rash. Neurological: Negative for dizziness, tremors, seizures and headaches. Endo/Heme/Allergies: Negative for polydipsia. Does not bruise/bleed easily. Psychiatric/Behavioral: Negative for depression and substance abuse. The patient is not nervous/anxious. Physical Exam  Vitals and nursing note reviewed. Constitutional:       Appearance: He is well-developed. He is not diaphoretic. HENT:      Head: Normocephalic and atraumatic. Right Ear: External ear normal.      Mouth/Throat:      Pharynx: No oropharyngeal exudate. Eyes:      General: No scleral icterus. Conjunctiva/sclera: Conjunctivae normal.      Pupils: Pupils are equal, round, and reactive to light. Neck:      Thyroid: No thyromegaly. Vascular: No JVD. Cardiovascular:      Rate and Rhythm: Normal rate and regular rhythm. Pulses:           Dorsalis pedis pulses are 2+ on the right side and 2+ on the left side.         Posterior tibial pulses are 2+ on the right side and 2+ on the left side. Heart sounds: Normal heart sounds. No murmur heard. Pulmonary:      Effort: Pulmonary effort is normal.      Breath sounds: Normal breath sounds. No wheezing. Abdominal:      General: Bowel sounds are normal. There is no distension. Palpations: Abdomen is soft. There is no mass. Musculoskeletal:         General: No tenderness. Normal range of motion. Cervical back: Normal range of motion and neck supple. Feet:      Right foot:      Protective Sensation: 10 sites tested. 9 sites sensed. Skin integrity: Callus present. Toenail Condition: Right toenails are abnormally thick. Fungal disease present. Left foot:      Protective Sensation: 10 sites tested. 9 sites sensed. Skin integrity: Callus present. Toenail Condition: Left toenails are abnormally thick. Fungal disease present. Lymphadenopathy:      Cervical: No cervical adenopathy. Skin:     General: Skin is warm and dry. Findings: No rash. Neurological:      Mental Status: He is alert and oriented to person, place, and time. Cranial Nerves: No cranial nerve deficit. Deep Tendon Reflexes: Reflexes are normal and symmetric. Reflexes normal.         ASSESSMENT and PLAN  Diagnoses and all orders for this visit:    1. New onset type 2 diabetes mellitus ([facility] [address]  Assessment & Plan:   well controlled, continue current medications pending work up below, medication adherence emphasized, lifestyle modifications recommended    Orders:  -      DIABETES FOOT EXAM  -     MICROALBUMIN, UR, RAND W/ MICROALB/CREAT RATIO; Future  -     HEMOGLOBIN A1C WITH EAG; Future  -     METABOLIC PANEL, COMPREHENSIVE; Future  -     REFERRAL TO PODIATRY    2. Essential hypertension  -     amLODIPine (NORVASC) 10 mg tablet; Take 1 Tablet by mouth daily.  -     MICROALBUMIN, UR, RAND W/ MICROALB/CREAT RATIO; Future  -     METABOLIC PANEL, COMPREHENSIVE; Future    3. Primary osteoarthritis of right knee    4.  Benign prostatic hyperplasia with nocturia  -     dutasteride (AVODART) 0.5 mg capsule; Take 1 Capsule by mouth daily. 5. Vitamin D deficiency  -     VITAMIN D, 25 HYDROXY; Future      Had a long discussion on compliance with medications and follow-ups. Increase dose of amlodipine from 5 to 10 mg.  Recommended to check blood pressure daily and update me in 2 weeks. Strict follow-up in 3 months. Discussed lifestyle issues and health guidance given  Patient was given an after visit summary which includes diagnoses, vital signs, current medications, instructions and references & authorized prescriptions . Results of labs will be conveyed to patient, once available. Pt verbalized instructions I provided and expressed understanding of discussion that was held today.   Follow-up and Dispositions    · Return in about 3 months (around 8/19/2021) for fasting, physical.

## 2021-05-20 LAB
25(OH)D3 SERPL-MCNC: 13.7 NG/ML (ref 30–100)
ALBUMIN SERPL-MCNC: 4.2 G/DL (ref 3.5–5)
ALBUMIN/GLOB SERPL: 1 {RATIO} (ref 1.1–2.2)
ALP SERPL-CCNC: 69 U/L (ref 45–117)
ALT SERPL-CCNC: 49 U/L (ref 12–78)
ANION GAP SERPL CALC-SCNC: 5 MMOL/L (ref 5–15)
AST SERPL-CCNC: 42 U/L (ref 15–37)
BILIRUB SERPL-MCNC: 0.7 MG/DL (ref 0.2–1)
BUN SERPL-MCNC: 17 MG/DL (ref 6–20)
BUN/CREAT SERPL: 14 (ref 12–20)
CALCIUM SERPL-MCNC: 9.4 MG/DL (ref 8.5–10.1)
CHLORIDE SERPL-SCNC: 106 MMOL/L (ref 97–108)
CO2 SERPL-SCNC: 28 MMOL/L (ref 21–32)
CREAT SERPL-MCNC: 1.18 MG/DL (ref 0.7–1.3)
CREAT UR-MCNC: 226 MG/DL
EST. AVERAGE GLUCOSE BLD GHB EST-MCNC: 131 MG/DL
GLOBULIN SER CALC-MCNC: 4.4 G/DL (ref 2–4)
GLUCOSE SERPL-MCNC: 102 MG/DL (ref 65–100)
HBA1C MFR BLD: 6.2 % (ref 4–5.6)
MICROALBUMIN UR-MCNC: 1.7 MG/DL
MICROALBUMIN/CREAT UR-RTO: 8 MG/G (ref 0–30)
POTASSIUM SERPL-SCNC: 4.2 MMOL/L (ref 3.5–5.1)
PROT SERPL-MCNC: 8.6 G/DL (ref 6.4–8.2)
SODIUM SERPL-SCNC: 139 MMOL/L (ref 136–145)

## 2021-05-21 NOTE — PROGRESS NOTES
Monica Baugh Trina,I have reviewed your results. Kidney and liver functions are normal and urine is negative for protein. Your average blood sugar has improved to 6.2 and is now in the prediabetic range. You can continue with diet and exercise. As we discussed, you need strict 3 months follow-up as we have adjusted your blood pressure medicine and started you on a prostate medication. Please be compliant with follow-up and medications. Thanks

## 2021-07-24 DIAGNOSIS — I10 ESSENTIAL HYPERTENSION: ICD-10-CM

## 2021-07-25 RX ORDER — AMLODIPINE BESYLATE 10 MG/1
10 TABLET ORAL DAILY
Qty: 90 TABLET | Refills: 0 | Status: SHIPPED | OUTPATIENT
Start: 2021-07-25 | End: 2021-11-12 | Stop reason: SDUPTHER

## 2021-09-01 DIAGNOSIS — E11.9 NEW ONSET TYPE 2 DIABETES MELLITUS (HCC): ICD-10-CM

## 2021-09-01 RX ORDER — GLIMEPIRIDE 1 MG/1
TABLET ORAL
Qty: 90 TABLET | OUTPATIENT
Start: 2021-09-01

## 2021-10-07 DIAGNOSIS — E11.9 NEW ONSET TYPE 2 DIABETES MELLITUS (HCC): ICD-10-CM

## 2021-10-07 RX ORDER — GLIMEPIRIDE 1 MG/1
TABLET ORAL
Qty: 30 TABLET | Refills: 0 | OUTPATIENT
Start: 2021-10-07

## 2021-10-15 DIAGNOSIS — E11.9 NEW ONSET TYPE 2 DIABETES MELLITUS (HCC): ICD-10-CM

## 2021-10-15 RX ORDER — GLIMEPIRIDE 1 MG/1
1 TABLET ORAL
Qty: 30 TABLET | Refills: 0 | OUTPATIENT
Start: 2021-10-15

## 2021-10-15 NOTE — TELEPHONE ENCOUNTER
Pt wife is requesting a refill until can be seen on 11/02/2021? Please review. Last visit 05/19/2021 MD Sherry Elizabeth   Next appointment 11/02/2021 MD Sherry Elizabeth   Previous refill encounter(s)   09/01/2021 Amaryl #30     Requested Prescriptions     Pending Prescriptions Disp Refills    glimepiride (AMARYL) 1 mg tablet 30 Tablet 0     Sig: Take 1 Tablet by mouth Daily (before breakfast).

## 2021-10-15 NOTE — TELEPHONE ENCOUNTER
No further refills as patient has canceled his appointment 3 times after he gets his medication refilled. He canceled his appointment on 26 August, 27 September, October 12. It is not safe for me or patient to continue with the prescription without checking numbers.

## 2021-11-12 DIAGNOSIS — I10 ESSENTIAL HYPERTENSION: ICD-10-CM

## 2021-11-12 NOTE — TELEPHONE ENCOUNTER
Outbound call to patient's wife as per PHI. Name and  verified. Informed wife that as per 's previous note, no further refills because pt has canceled his appointment 3 times after he gets his medication refilled. He canceled his appointment on , , . And was no show for 21. It is not safe for Glenora Squibb or patient to continue with the prescription without checking numbers. She stated understanding.

## 2021-11-12 NOTE — TELEPHONE ENCOUNTER
Patient came in today stating that he had an appointment, patient appointment was orginally scheduled for 11/2/2021. Patient has rescheduled appointment for 12/1/2021. Patient is completely out of amLODIPine (NORVASC) 10 mg tablet, pt is requesting for a refill that can at least can last him until his upcoming appointment. .  Requested Prescriptions     Pending Prescriptions Disp Refills    amLODIPine (NORVASC) 10 mg tablet 90 Tablet 0     Sig: Take 1 Tablet by mouth daily.        Pharmacy on file verified    [phone]

## 2021-11-13 RX ORDER — AMLODIPINE BESYLATE 10 MG/1
10 TABLET ORAL DAILY
Qty: 90 TABLET | Refills: 0 | Status: SHIPPED | OUTPATIENT
Start: 2021-11-13 | End: 2022-02-07

## 2021-12-01 ENCOUNTER — OFFICE VISIT (OUTPATIENT)
Dept: FAMILY MEDICINE CLINIC | Age: 66
End: 2021-12-01
Payer: MEDICARE

## 2021-12-01 VITALS
RESPIRATION RATE: 16 BRPM | BODY MASS INDEX: 34.51 KG/M2 | WEIGHT: 233 LBS | HEIGHT: 69 IN | SYSTOLIC BLOOD PRESSURE: 138 MMHG | DIASTOLIC BLOOD PRESSURE: 82 MMHG | HEART RATE: 74 BPM | TEMPERATURE: 98.2 F | OXYGEN SATURATION: 98 %

## 2021-12-01 DIAGNOSIS — Z23 ENCOUNTER FOR IMMUNIZATION: ICD-10-CM

## 2021-12-01 DIAGNOSIS — Z12.11 SCREEN FOR COLON CANCER: ICD-10-CM

## 2021-12-01 DIAGNOSIS — R35.1 BENIGN PROSTATIC HYPERPLASIA WITH NOCTURIA: ICD-10-CM

## 2021-12-01 DIAGNOSIS — I10 ESSENTIAL HYPERTENSION: ICD-10-CM

## 2021-12-01 DIAGNOSIS — N40.1 BENIGN PROSTATIC HYPERPLASIA WITH NOCTURIA: ICD-10-CM

## 2021-12-01 DIAGNOSIS — E11.9 NEW ONSET TYPE 2 DIABETES MELLITUS (HCC): Primary | ICD-10-CM

## 2021-12-01 PROCEDURE — 99214 OFFICE O/P EST MOD 30 MIN: CPT | Performed by: FAMILY MEDICINE

## 2021-12-01 PROCEDURE — 90732 PPSV23 VACC 2 YRS+ SUBQ/IM: CPT | Performed by: FAMILY MEDICINE

## 2021-12-01 NOTE — PROGRESS NOTES
Chief Complaint   Patient presents with    Hypertension     follow up    Diabetes     follow up         1. \"Have you been to the ER, urgent care clinic since your last visit? Hospitalized since your last visit? \" No    2. \"Have you seen or consulted any other health care providers outside of the [address] [place] [address] since your last visit? \" No     3. For patients over 45: Has the patient had a colonoscopy? No     If the patient is female:    4. For patients over 36: Has the patient had a mammogram? NA based on age or sex    11. For patients over 21: Has the patient had a pap smear?  NA based on age or sex       3 most recent PHQ Screens 12/1/2021   Little interest or pleasure in doing things Not at all   Feeling down, depressed, irritable, or hopeless Not at all   Total Score PHQ 2 0       Health Maintenance Due   Topic Date Due    Eye Exam Retinal or Dilated  Never done    DTaP/Tdap/Td series (1 - Tdap) Never done    Colorectal Cancer Screening Combo  Never done    Shingrix Vaccine Age 50> (1 of 2) Never done    Medicare Yearly Exam  Never done    Lipid Screen  07/16/2021    Flu Vaccine (1) 09/01/2021    Pneumococcal 65+ years (1 of 1 - PPSV23) Never done    COVID-19 Vaccine (3 - Booster for [employer] series) 11/05/2021     Pt declined flu vaccine

## 2021-12-01 NOTE — PATIENT INSTRUCTIONS
Learning About Type 2 Diabetes  What is type 2 diabetes? Type 2 diabetes is a condition in which you have too much sugar (glucose) in your blood. Glucose is a type of sugar produced in your body when carbohydrates and other foods are digested. It provides energy to cells throughout the body. Normally, blood sugar levels increase after you eat a meal. When blood sugar rises, cells in the pancreas release insulin, which causes the body to absorb sugar from the blood and lowers the blood sugar level to normal.  When you have type 2 diabetes, sugar stays in the blood rather than entering the body's cells to be used for energy. This results in high blood sugar. It happens when your body can't use insulin the right way. Over time, high blood sugar can harm many parts of the body, such as your eyes, heart, blood vessels, nerves, and kidneys. It can also increase your risk for other health problems (complications). What can you expect with type 2 diabetes? Briana Roque keep hearing about how important it is to keep your blood sugar within a target range. That's because over time, high blood sugar can lead to serious problems. It can:  · Harm your eyes, nerves, and kidneys. · Damage your blood vessels, leading to heart disease and stroke. · Reduce blood flow and cause nerve damage to parts of your body, especially your feet. This can cause slow healing and pain when you walk. · Make your immune system weak and less able to fight infections. When people hear the word \"diabetes,\" they often think of problems like these. But daily care and treatment can help prevent or delay these problems. The goal is to keep your blood sugar in a target range. That's the best way to reduce your chance of having more problems from diabetes. What are the symptoms? Some people who have type 2 diabetes may not have any symptoms early on.  Many people with the disease don't even know they have it at first. But with time, diabetes starts to cause symptoms. You have most symptoms of type 2 diabetes when your blood sugar is either too high or too low. The most common symptoms of high blood sugar include:  · Thirst.  · Needing to urinate often. · Weight loss. · Blurry vision. The symptoms of low blood sugar include:  · Sweating. · Shakiness. · Weakness. · Hunger. · Confusion. You're not likely to get symptoms of low blood sugar unless you take insulin or use certain diabetes medicines that lower blood sugar. How can you help prevent type 2 diabetes? There are things you can do to help prevent type 2 diabetes. Stay at a healthy weight. Exercise regularly, and eat healthy foods. Even small changes can make a difference. If you have prediabetes, the medicine metformin can help prevent type 2 diabetes. How is type 2 diabetes treated? Treatment for type 2 diabetes will change over time to meet your needs. But the focus of your treatment will usually be to keep your blood sugar levels in your target range. This will help prevent problems such as eye, kidney, heart, blood vessel, and nerve disease. Some people may need medicines to help their bodies make insulin or decrease insulin resistance. Some medicines slow down how quickly the body absorbs carbohydrates. Treatment to manage type 2 diabetes includes:  · Making healthy food choices and being active. · Losing weight, if you need to. · Seeing your doctor regularly. · Keeping your blood sugar in your target range. · Taking medicines, if you need them. · Quitting smoking, if you smoke. · Keeping your blood pressure and cholesterol under control. Follow-up care is a key part of your treatment and safety. Be sure to make and go to all appointments, and call your doctor if you are having problems. It's also a good idea to know your test results and keep a list of the medicines you take. Where can you learn more?   Go to http://tr-efraín.info/  Enter [id number] in the search box to learn more about \"Learning About Type 2 Diabetes. \"  Current as of: August 31, 2020               Content Version: 13.0  © 2006-2021 [employer]wise[employer]. Care instructions adapted under license by [employer] (which disclaims liability or warranty for this information). If you have questions about a medical condition or this instruction, always ask your healthcare professional. [place] [address] any warranty or liability for your use of this information.

## 2021-12-01 NOTE — LETTER
NOTIFICATION RETURN TO WORK / SCHOOL    12/1/2021 2:20 PM    Mr. Syd Nolasco  [address] [place] [id number]      To Whom It May Concern:    Syd Nolasco is currently under the care of MEÑO Reyes. He will return to work/school on: 11/02/21    If there are questions or concerns please have the patient contact our office.         Sincerely,      Milo Aguirre MD

## 2021-12-01 NOTE — PROGRESS NOTES
HISTORY OF PRESENT ILLNESS  Hector Pizano is a 73 y.o. male. Patient was seen today for follow-up on diabetes, dyslipidemia, hypertension and BPH. Has been significantly noncompliant with follow-ups and medications. HPI  Endocrine Review  He is seen for diabetes and obesity. Testing: is not performed. He reports medication compliance: Extremely noncompliant as she did not take medicine at all that was prescribed. Medication side effects: yes  Diabetic diet compliance: noncompliant some of the time. He was prescribed with Metformin after his last blood work report in July. He had side effects like diarrhea and abdominal cramps and medication was changed to glimepiride . Has not taken his medication for last few weeks as he missed his appointments and blood work.     Cardiovascular Review  The patient has hypertension. He reports taking medications as instructed, no medication side effects noted, home BP monitoring in range of 1 88-2 39E'Q systolic over 30N'Q diastolic, no chest pain on exertion, no dyspnea on exertion, no swelling of ankles, no orthostatic dizziness or lightheadedness, no orthopnea or paroxysmal nocturnal dyspnea, no palpitations, no intermittent claudication symptoms. Diet and Lifestyle: generally follows a low fat low cholesterol diet, generally follows a low sodium diet, sedentary, nonsmoker, According to him he walks a lot at work. Lab review: labs reviewed and discussed with patient. Medicines: Amlodipine 5 mg     Urology Review  He is here today because of BPH. He reports his symptoms are reasonably well controlled. His symptoms include: nocturia x 3. He denies: urinary hesitancy, urinary frequency, incomplete voiding, double voiding, weak stream, perineal discomfort, dysuria, hematuria, abdominal pain, flank pain, testicular pain. He is on the following medications: Avodart (dutasteride). He reports the following medication side effects: none.   His symptoms have improved significantly with medications. Review of Systems   Constitutional: Negative for chills, fever and malaise/fatigue. HENT: Negative for congestion, ear pain, sore throat and tinnitus. Eyes: Negative for blurred vision, double vision, pain and discharge. Respiratory: Negative for cough, shortness of breath and wheezing. Cardiovascular: Negative for chest pain, palpitations and leg swelling. Gastrointestinal: Negative for abdominal pain, blood in stool, constipation, diarrhea, nausea and vomiting. Genitourinary: Negative for dysuria, frequency, hematuria and urgency. Musculoskeletal: Negative for back pain, joint pain and myalgias. Skin: Negative for rash. Neurological: Negative for dizziness, tremors, seizures and headaches. Endo/Heme/Allergies: Negative for polydipsia. Does not bruise/bleed easily. Psychiatric/Behavioral: Negative for depression and substance abuse. The patient is not nervous/anxious. Physical Exam  Vitals and nursing note reviewed. Constitutional:       Appearance: He is well-developed. He is not diaphoretic. HENT:      Head: Normocephalic and atraumatic. Right Ear: External ear normal.      Mouth/Throat:      Pharynx: No oropharyngeal exudate. Eyes:      General: No scleral icterus. Conjunctiva/sclera: Conjunctivae normal.      Pupils: Pupils are equal, round, and reactive to light. Neck:      Thyroid: No thyromegaly. Vascular: No JVD. Cardiovascular:      Rate and Rhythm: Normal rate and regular rhythm. Heart sounds: Normal heart sounds. No murmur heard. Pulmonary:      Effort: Pulmonary effort is normal.      Breath sounds: Normal breath sounds. No wheezing. Abdominal:      General: Bowel sounds are normal. There is no distension. Palpations: Abdomen is soft. There is no mass. Musculoskeletal:         General: No tenderness. Normal range of motion. Cervical back: Normal range of motion and neck supple. Lymphadenopathy:      Cervical: No cervical adenopathy. Skin:     General: Skin is warm and dry. Findings: No rash. Neurological:      Mental Status: He is alert and oriented to person, place, and time. Cranial Nerves: No cranial nerve deficit. Deep Tendon Reflexes: Reflexes are normal and symmetric. Reflexes normal.         ASSESSMENT and PLAN  Diagnoses and all orders for this visit:    1. New onset type 2 diabetes mellitus ([facility] [address]  Assessment & Plan:   well controlled, continue current medications pending work up below, medication adherence emphasized, lifestyle modifications recommended    Orders:  -     [address]  -     LIPID PANEL; Future  -     METABOLIC PANEL, COMPREHENSIVE; Future  -     HEMOGLOBIN A1C WITH EAG; Future    2. Benign prostatic hyperplasia with nocturia  -     PSA W/ REFLX FREE PSA; Future    3. Essential hypertension  -     METABOLIC PANEL, COMPREHENSIVE; Future    4. Encounter for immunization  -     PNEUMOCOCCAL POLYSACCHARIDE VACCINE, 23-VALENT, ADULT OR IMMUNOSUPPRESSED PT DOSE,  -     ADMIN PNEUMOCOCCAL VACCINE    5. Screen for colon cancer  -     REFERRAL FOR COLONOSCOPY  . Discussed lifestyle issues and health guidance given  Patient was given an after visit summary which includes diagnoses, vital signs, current medications, instructions and references & authorized prescriptions . Results of labs will be conveyed to patient, once available. Pt verbalized instructions I provided and expressed understanding of discussion that was held today. Follow-up and Dispositions    · Return in about 3 months (around 3/1/2022) for fasting, medicare wellness, physical.         Please note that this dictation was completed with [employer], the [employer] voice recognition software. Quite often unanticipated grammatical, syntax, homophones, and other interpretive errors are inadvertently transcribed by the computer software. Please disregard these errors.   Please excuse any errors that have escaped final proofreading. Thank you.

## 2021-12-02 LAB
ALBUMIN SERPL-MCNC: 4 G/DL (ref 3.5–5)
ALBUMIN/GLOB SERPL: 0.9 {RATIO} (ref 1.1–2.2)
ALP SERPL-CCNC: 64 U/L (ref 45–117)
ALT SERPL-CCNC: 43 U/L (ref 12–78)
ANION GAP SERPL CALC-SCNC: 6 MMOL/L (ref 5–15)
AST SERPL-CCNC: 26 U/L (ref 15–37)
BILIRUB SERPL-MCNC: 0.6 MG/DL (ref 0.2–1)
BUN SERPL-MCNC: 18 MG/DL (ref 6–20)
BUN/CREAT SERPL: 15 (ref 12–20)
CALCIUM SERPL-MCNC: 9.5 MG/DL (ref 8.5–10.1)
CHLORIDE SERPL-SCNC: 106 MMOL/L (ref 97–108)
CHOLEST SERPL-MCNC: 176 MG/DL
CO2 SERPL-SCNC: 26 MMOL/L (ref 21–32)
CREAT SERPL-MCNC: 1.21 MG/DL (ref 0.7–1.3)
EST. AVERAGE GLUCOSE BLD GHB EST-MCNC: 134 MG/DL
GLOBULIN SER CALC-MCNC: 4.4 G/DL (ref 2–4)
GLUCOSE SERPL-MCNC: 93 MG/DL (ref 65–100)
HBA1C MFR BLD: 6.3 % (ref 4–5.6)
HDLC SERPL-MCNC: 49 MG/DL
HDLC SERPL: 3.6 {RATIO} (ref 0–5)
LDLC SERPL CALC-MCNC: 110.6 MG/DL (ref 0–100)
POTASSIUM SERPL-SCNC: 4.3 MMOL/L (ref 3.5–5.1)
PROT SERPL-MCNC: 8.4 G/DL (ref 6.4–8.2)
SODIUM SERPL-SCNC: 138 MMOL/L (ref 136–145)
TRIGL SERPL-MCNC: 82 MG/DL (ref ?–150)
VLDLC SERPL CALC-MCNC: 16.4 MG/DL

## 2021-12-02 RX ORDER — GLIMEPIRIDE 1 MG/1
1 TABLET ORAL
Qty: 90 TABLET | Refills: 0 | Status: SHIPPED | OUTPATIENT
Start: 2021-12-02 | End: 2022-05-10 | Stop reason: SDUPTHER

## 2021-12-02 NOTE — PROGRESS NOTES
Monica ValeroRobbie Mena,I have reviewed your results. A1c, has been little up from last time, 6.3, most likely is you did not take your glimepiride for last 2 weeks. I have sent your prescription to pharmacy, please take your medicines regularly. Kidney and liver function as well as cholesterol results are reassuring and in acceptable range. No change in current medications and let me know if you have any question, thanks.

## 2021-12-03 DIAGNOSIS — R35.1 BENIGN PROSTATIC HYPERPLASIA WITH NOCTURIA: ICD-10-CM

## 2021-12-03 DIAGNOSIS — N40.1 BENIGN PROSTATIC HYPERPLASIA WITH NOCTURIA: ICD-10-CM

## 2021-12-03 LAB
% FREE PSA, 480797: 17.1 %
PSA SERPL-MCNC: 5.8 NG/ML (ref 0–4)
PSA, FREE, 480853: 0.99 NG/ML
REFLEX CRITERIA: ABNORMAL

## 2021-12-03 RX ORDER — DUTASTERIDE 0.5 MG/1
0.5 CAPSULE, LIQUID FILLED ORAL DAILY
Qty: 30 CAPSULE | Refills: 4 | Status: SHIPPED | OUTPATIENT
Start: 2021-12-03 | End: 2022-05-10 | Stop reason: SDUPTHER

## 2021-12-03 NOTE — PROGRESS NOTES
Called patient's wife as per PHI. Two patient identifiers verified. Discussed lab results per provider's note. She Verbalized understanding.

## 2021-12-03 NOTE — PROGRESS NOTES
Monica Valero Wolf Peng,  I have reviewed your prostate cancer screening report. It is borderline up but has been stable in comparison to results from last few years. Number is consistent with significant prostate enlargement and strongly recommend to take your dutasteride every day without fail. Let me know if you have any questions.   thanks

## 2022-02-01 ENCOUNTER — TELEPHONE (OUTPATIENT)
Dept: FAMILY MEDICINE CLINIC | Age: 67
End: 2022-02-01

## 2022-03-04 DIAGNOSIS — I10 ESSENTIAL HYPERTENSION: ICD-10-CM

## 2022-03-04 RX ORDER — AMLODIPINE BESYLATE 10 MG/1
TABLET ORAL
Qty: 30 TABLET | Refills: 0 | OUTPATIENT
Start: 2022-03-04

## 2022-03-09 ENCOUNTER — HOSPITAL ENCOUNTER (EMERGENCY)
Age: 67
Discharge: HOME | End: 2022-03-09
Attending: EMERGENCY MEDICINE | Admitting: EMERGENCY MEDICINE
Payer: MEDICARE

## 2022-03-09 VITALS
DIASTOLIC BLOOD PRESSURE: 92 MMHG | RESPIRATION RATE: 16 BRPM | TEMPERATURE: 98.3 F | HEART RATE: 68 BPM | OXYGEN SATURATION: 100 % | SYSTOLIC BLOOD PRESSURE: 184 MMHG

## 2022-03-09 DIAGNOSIS — E11.9 NEW ONSET TYPE 2 DIABETES MELLITUS (HCC): ICD-10-CM

## 2022-03-09 DIAGNOSIS — K04.7 DENTAL INFECTION: Primary | ICD-10-CM

## 2022-03-09 PROCEDURE — 96372 THER/PROPH/DIAG INJ SC/IM: CPT

## 2022-03-09 PROCEDURE — 99284 EMERGENCY DEPT VISIT MOD MDM: CPT

## 2022-03-09 PROCEDURE — 74011250636 HC RX REV CODE- 250/636: Performed by: PHYSICIAN ASSISTANT

## 2022-03-09 PROCEDURE — 74011250637 HC RX REV CODE- 250/637: Performed by: PHYSICIAN ASSISTANT

## 2022-03-09 RX ORDER — KETOROLAC TROMETHAMINE 30 MG/ML
30 INJECTION, SOLUTION INTRAMUSCULAR; INTRAVENOUS
Status: COMPLETED | OUTPATIENT
Start: 2022-03-09 | End: 2022-03-09

## 2022-03-09 RX ORDER — HYDROCODONE BITARTRATE AND ACETAMINOPHEN 5; 325 MG/1; MG/1
1 TABLET ORAL ONCE
Status: COMPLETED | OUTPATIENT
Start: 2022-03-09 | End: 2022-03-09

## 2022-03-09 RX ORDER — PENICILLIN V POTASSIUM 500 MG/1
500 TABLET, FILM COATED ORAL 4 TIMES DAILY
Qty: 28 TABLET | Refills: 0 | Status: SHIPPED | OUTPATIENT
Start: 2022-03-09 | End: 2022-03-16

## 2022-03-09 RX ORDER — PENICILLIN V POTASSIUM 250 MG/1
500 TABLET, FILM COATED ORAL
Status: COMPLETED | OUTPATIENT
Start: 2022-03-09 | End: 2022-03-09

## 2022-03-09 RX ORDER — CHLORHEXIDINE GLUCONATE 1.2 MG/ML
15 RINSE ORAL EVERY 12 HOURS
Qty: 420 ML | Refills: 0 | Status: SHIPPED | OUTPATIENT
Start: 2022-03-09 | End: 2022-03-23

## 2022-03-09 RX ORDER — HYDROCODONE BITARTRATE AND ACETAMINOPHEN 5; 325 MG/1; MG/1
1 TABLET ORAL
Qty: 6 TABLET | Refills: 0 | Status: SHIPPED | OUTPATIENT
Start: 2022-03-09 | End: 2022-03-10

## 2022-03-09 RX ORDER — GLIMEPIRIDE 1 MG/1
TABLET ORAL
Qty: 90 TABLET | Refills: 0 | OUTPATIENT
Start: 2022-03-09

## 2022-03-09 RX ORDER — HYDROCODONE BITARTRATE AND ACETAMINOPHEN 5; 325 MG/1; MG/1
1 TABLET ORAL
Qty: 6 TABLET | Refills: 0 | Status: SHIPPED | OUTPATIENT
Start: 2022-03-09 | End: 2022-03-09 | Stop reason: SDUPTHER

## 2022-03-09 RX ADMIN — PENICILLIN V POTASSIUM 500 MG: 250 TABLET, FILM COATED ORAL at 01:39

## 2022-03-09 RX ADMIN — HYDROCODONE BITARTRATE AND ACETAMINOPHEN 1 TABLET: 5; 325 TABLET ORAL at 01:39

## 2022-03-09 RX ADMIN — KETOROLAC TROMETHAMINE 30 MG: 30 INJECTION, SOLUTION INTRAMUSCULAR; INTRAVENOUS at 01:40

## 2022-03-09 NOTE — ED PROVIDER NOTES
73year old M presenting to the ED for dental pain. Notes that now it is radiating into the right ear. Ongoing x 1 week, worse tonight. Worse with eating/drinking on that side. Has felt feverish at times. Does not have a dentist at the current time. Took his regular meds earlier today and a Tylenol tonight. No difficulty swallwing. Pain is moderately severe, throbbing.  + nasal congestion, \"my sinuses are swollen. \"    PMHx: HTN  PSx: left foot  Social: non-smoker.  at [employer]           Past Medical History:   Diagnosis Date    Hypertension        Past Surgical History:   Procedure Laterality Date    HX ORTHOPAEDIC      Achilles Tendon/ left foot          Family History:   Problem Relation Age of Onset    Diabetes Mother     Diabetes Sister     Hypertension Sister     Diabetes Brother     Hypertension Brother        Social History     Socioeconomic History    Marital status:      Spouse name: Not on file    Number of children: Not on file    Years of education: Not on file    Highest education level: Not on file   Occupational History    Not on file   Tobacco Use    Smoking status: Never Smoker    Smokeless tobacco: Never Used   Vaping Use    Vaping Use: Never used   Substance and Sexual Activity    Alcohol use: No    Drug use: No    Sexual activity: Yes   Other Topics Concern    Not on file   Social History Narrative    Not on file     Social Determinants of Health     Financial Resource Strain:     Difficulty of Paying Living Expenses: Not on file   Food Insecurity:     Worried About [address] in the Last Year: Not on file    Tramaine of Food in the Last Year: Not on file   Transportation Needs:     Lack of Transportation (Medical): Not on file    Lack of Transportation (Non-Medical):  Not on file   Physical Activity:     Days of Exercise per Week: Not on file    Minutes of Exercise per Session: Not on file   Stress:     Feeling of Stress : Not on file   Social Connections:     Frequency of Communication with Friends and Family: Not on file    Frequency of Social Gatherings with Friends and Family: Not on file    Attends Jain Services: Not on file    Active Member of Clubs or Organizations: Not on file    Attends Club or Organization Meetings: Not on file    Marital Status: Not on file   Intimate Partner Violence:     Fear of Current or Ex-Partner: Not on file    Emotionally Abused: Not on file    Physically Abused: Not on file    Sexually Abused: Not on file   Housing Stability:     Unable to Pay for Housing in the Last Year: Not on file    Number of [place] in the Last Year: Not on file    Unstable Housing in the Last Year: Not on file         ALLERGIES: Metformin and Prednisone    Review of Systems   Constitutional: Negative for fever. HENT: Positive for congestion, dental problem and ear pain. Negative for facial swelling. Respiratory: Negative for shortness of breath. Cardiovascular: Negative for chest pain. Gastrointestinal: Negative for vomiting. Skin: Negative for wound. Neurological: Negative for syncope. All other systems reviewed and are negative. Vitals:    03/09/22 0016   BP: (!) 184/92   Pulse: 68   Resp: 16   Temp: 98.3 °F (36.8 °C)   SpO2: 100%            Physical Exam  Vitals and nursing note reviewed. Constitutional:       General: He is not in acute distress. Appearance: He is well-developed. Comments: Pleasant, well-appearing, no distress   HENT:      Head: Normocephalic and atraumatic.       Comments: No facial swelling or cellulitis  No trismus, stridor, drooling  Midline uvula  No sublingual or submandibular swelling  Overall poor dentition  2 teeth in the right upper gum with large caries, one nearly eroded to the gumline, with some surrounding erythema of the gum, no obvious drainable abscess  Normal tympanic membrane     Right Ear: External ear normal. Left Ear: External ear normal.   Eyes:      General: No scleral icterus. Conjunctiva/sclera: Conjunctivae normal.   Neck:      Trachea: No tracheal deviation. Cardiovascular:      Rate and Rhythm: Normal rate and regular rhythm. Heart sounds: Normal heart sounds. No murmur heard. No friction rub. No gallop. Pulmonary:      Effort: Pulmonary effort is normal. No respiratory distress. Breath sounds: Normal breath sounds. No stridor. No wheezing. Abdominal:      General: There is no distension. Palpations: Abdomen is soft. Musculoskeletal:         General: Normal range of motion. Cervical back: Neck supple. Skin:     General: Skin is warm and dry. Neurological:      Mental Status: He is alert and oriented to person, place, and time. Psychiatric:         Behavior: Behavior normal.          MDM  Number of Diagnoses or Management Options  Diagnosis management comments: 69-year-old male presenting to the ED for dental pain, some erythema of the gum concerning for early infection, no obvious abscess.   Will treat with antibiotics, Peridex rinse, short course of pain medication, stressed importance of definitive management by a dentist.  No prior visits for dental pain, 0 prescriptions on  report       Amount and/or Complexity of Data Reviewed  Discuss the patient with other providers: yes (Dr. Marcy Galaviz ED attending)           Procedures

## 2022-03-09 NOTE — ED TRIAGE NOTES
Triage: Pt arrives ambulatory from home with CC of dental pain. He reports multiple bad teeth. He has been having pain for about a week. He reports the pain is now going into his right ear and the right side of his face.

## 2022-03-09 NOTE — DISCHARGE INSTRUCTIONS
Return for new or worsening symptoms. It is very important that you call make a follow-up appointment with a dentist for definitive management. Ideally, the medications that we provide you here will help to treat what is likely an early infection and by you sometime until you can see a dentist.  Return if you develop significant swelling of the face, fever, difficulty swallowing, swelling of the neck.

## 2022-03-18 PROBLEM — I10 ESSENTIAL HYPERTENSION: Status: ACTIVE | Noted: 2019-01-14

## 2022-03-19 PROBLEM — M17.11 PRIMARY OSTEOARTHRITIS OF RIGHT KNEE: Status: ACTIVE | Noted: 2019-01-14

## 2022-03-19 PROBLEM — E11.9 NEW ONSET TYPE 2 DIABETES MELLITUS (HCC): Status: ACTIVE | Noted: 2021-05-19

## 2022-03-19 PROBLEM — R35.1 BENIGN PROSTATIC HYPERPLASIA WITH NOCTURIA: Status: ACTIVE | Noted: 2021-05-19

## 2022-03-19 PROBLEM — E66.9 OBESITY (BMI 30.0-34.9): Status: ACTIVE | Noted: 2019-01-14

## 2022-03-19 PROBLEM — N40.1 BENIGN PROSTATIC HYPERPLASIA WITH NOCTURIA: Status: ACTIVE | Noted: 2021-05-19

## 2022-03-19 PROBLEM — E66.811 OBESITY (BMI 30.0-34.9): Status: ACTIVE | Noted: 2019-01-14

## 2022-03-19 PROBLEM — M47.816 ARTHRITIS OF LUMBAR SPINE: Status: ACTIVE | Noted: 2019-01-14

## 2022-05-08 ENCOUNTER — APPOINTMENT (OUTPATIENT)
Dept: CT IMAGING | Age: 67
End: 2022-05-08
Attending: FAMILY MEDICINE
Payer: MEDICARE

## 2022-05-08 ENCOUNTER — HOSPITAL ENCOUNTER (OUTPATIENT)
Age: 67
Setting detail: OBSERVATION
Discharge: HOME | End: 2022-05-10
Attending: EMERGENCY MEDICINE | Admitting: FAMILY MEDICINE
Payer: MEDICARE

## 2022-05-08 ENCOUNTER — APPOINTMENT (OUTPATIENT)
Dept: GENERAL RADIOLOGY | Age: 67
End: 2022-05-08
Attending: FAMILY MEDICINE
Payer: MEDICARE

## 2022-05-08 DIAGNOSIS — E11.9 NEW ONSET TYPE 2 DIABETES MELLITUS (HCC): ICD-10-CM

## 2022-05-08 DIAGNOSIS — I10 ESSENTIAL HYPERTENSION: ICD-10-CM

## 2022-05-08 DIAGNOSIS — R35.1 BENIGN PROSTATIC HYPERPLASIA WITH NOCTURIA: ICD-10-CM

## 2022-05-08 DIAGNOSIS — I10 HYPERTENSION, UNSPECIFIED TYPE: Primary | ICD-10-CM

## 2022-05-08 DIAGNOSIS — N40.1 BENIGN PROSTATIC HYPERPLASIA WITH NOCTURIA: ICD-10-CM

## 2022-05-08 DIAGNOSIS — I21.4 NSTEMI (NON-ST ELEVATED MYOCARDIAL INFARCTION) (HCC): ICD-10-CM

## 2022-05-08 DIAGNOSIS — I10 PRIMARY HYPERTENSION: ICD-10-CM

## 2022-05-08 PROBLEM — R77.8 ELEVATED TROPONIN I LEVEL: Status: ACTIVE | Noted: 2022-05-08

## 2022-05-08 PROBLEM — R79.89 ELEVATED TROPONIN I LEVEL: Status: ACTIVE | Noted: 2022-05-08

## 2022-05-08 LAB
ALBUMIN SERPL-MCNC: 3.8 G/DL (ref 3.5–5)
ALBUMIN/GLOB SERPL: 0.8 {RATIO} (ref 1.1–2.2)
ALP SERPL-CCNC: 71 U/L (ref 45–117)
ALT SERPL-CCNC: 40 U/L (ref 12–78)
ANION GAP SERPL CALC-SCNC: 4 MMOL/L (ref 5–15)
AST SERPL-CCNC: 25 U/L (ref 15–37)
ATRIAL RATE: 67 BPM
BASOPHILS # BLD: 0 K/UL (ref 0–0.1)
BASOPHILS NFR BLD: 1 % (ref 0–1)
BILIRUB SERPL-MCNC: 0.4 MG/DL (ref 0.2–1)
BUN SERPL-MCNC: 14 MG/DL (ref 6–20)
BUN/CREAT SERPL: 13 (ref 12–20)
CALCIUM SERPL-MCNC: 8.9 MG/DL (ref 8.5–10.1)
CALCULATED P AXIS, ECG09: 58 DEGREES
CALCULATED R AXIS, ECG10: -51 DEGREES
CALCULATED T AXIS, ECG11: 9 DEGREES
CHLORIDE SERPL-SCNC: 107 MMOL/L (ref 97–108)
CO2 SERPL-SCNC: 27 MMOL/L (ref 21–32)
COMMENT, HOLDF: NORMAL
CREAT SERPL-MCNC: 1.07 MG/DL (ref 0.7–1.3)
DIAGNOSIS, 93000: NORMAL
DIFFERENTIAL METHOD BLD: NORMAL
EOSINOPHIL # BLD: 0.4 K/UL (ref 0–0.4)
EOSINOPHIL NFR BLD: 7 % (ref 0–7)
ERYTHROCYTE [DISTWIDTH] IN BLOOD BY AUTOMATED COUNT: 14.1 % (ref 11.5–14.5)
GLOBULIN SER CALC-MCNC: 5 G/DL (ref 2–4)
GLUCOSE SERPL-MCNC: 109 MG/DL (ref 65–100)
HCT VFR BLD AUTO: 45.2 % (ref 36.6–50.3)
HGB BLD-MCNC: 15.5 G/DL (ref 12.1–17)
IMM GRANULOCYTES # BLD AUTO: 0 K/UL (ref 0–0.04)
IMM GRANULOCYTES NFR BLD AUTO: 0 % (ref 0–0.5)
LYMPHOCYTES # BLD: 2.6 K/UL (ref 0.8–3.5)
LYMPHOCYTES NFR BLD: 48 % (ref 12–49)
MCH RBC QN AUTO: 31.4 PG (ref 26–34)
MCHC RBC AUTO-ENTMCNC: 34.3 G/DL (ref 30–36.5)
MCV RBC AUTO: 91.5 FL (ref 80–99)
MONOCYTES # BLD: 0.3 K/UL (ref 0–1)
MONOCYTES NFR BLD: 6 % (ref 5–13)
NEUTS SEG # BLD: 2.1 K/UL (ref 1.8–8)
NEUTS SEG NFR BLD: 38 % (ref 32–75)
NRBC # BLD: 0 K/UL (ref 0–0.01)
NRBC BLD-RTO: 0 PER 100 WBC
P-R INTERVAL, ECG05: 164 MS
PLATELET # BLD AUTO: 172 K/UL (ref 150–400)
PMV BLD AUTO: 10.9 FL (ref 8.9–12.9)
POTASSIUM SERPL-SCNC: 3.9 MMOL/L (ref 3.5–5.1)
PROT SERPL-MCNC: 8.8 G/DL (ref 6.4–8.2)
Q-T INTERVAL, ECG07: 394 MS
QRS DURATION, ECG06: 88 MS
QTC CALCULATION (BEZET), ECG08: 416 MS
RBC # BLD AUTO: 4.94 M/UL (ref 4.1–5.7)
SAMPLES BEING HELD,HOLD: NORMAL
SODIUM SERPL-SCNC: 138 MMOL/L (ref 136–145)
TROPONIN-HIGH SENSITIVITY: 121 NG/L (ref 0–76)
TROPONIN-HIGH SENSITIVITY: 126 NG/L (ref 0–76)
TROPONIN-HIGH SENSITIVITY: 128 NG/L (ref 0–76)
VENTRICULAR RATE, ECG03: 67 BPM
WBC # BLD AUTO: 5.5 K/UL (ref 4.1–11.1)

## 2022-05-08 PROCEDURE — 70450 CT HEAD/BRAIN W/O DYE: CPT

## 2022-05-08 PROCEDURE — 85025 COMPLETE CBC W/AUTO DIFF WBC: CPT

## 2022-05-08 PROCEDURE — 71046 X-RAY EXAM CHEST 2 VIEWS: CPT

## 2022-05-08 PROCEDURE — 74011250637 HC RX REV CODE- 250/637: Performed by: FAMILY MEDICINE

## 2022-05-08 PROCEDURE — 93005 ELECTROCARDIOGRAM TRACING: CPT

## 2022-05-08 PROCEDURE — 80053 COMPREHEN METABOLIC PANEL: CPT

## 2022-05-08 PROCEDURE — 99285 EMERGENCY DEPT VISIT HI MDM: CPT

## 2022-05-08 PROCEDURE — G0378 HOSPITAL OBSERVATION PER HR: HCPCS

## 2022-05-08 PROCEDURE — 84484 ASSAY OF TROPONIN QUANT: CPT

## 2022-05-08 PROCEDURE — 36415 COLL VENOUS BLD VENIPUNCTURE: CPT

## 2022-05-08 RX ORDER — HYDRALAZINE HYDROCHLORIDE 50 MG/1
25 TABLET, FILM COATED ORAL
Status: DISCONTINUED | OUTPATIENT
Start: 2022-05-08 | End: 2022-05-10 | Stop reason: HOSPADM

## 2022-05-08 RX ORDER — SODIUM CHLORIDE 0.9 % (FLUSH) 0.9 %
5-40 SYRINGE (ML) INJECTION AS NEEDED
Status: DISCONTINUED | OUTPATIENT
Start: 2022-05-08 | End: 2022-05-10 | Stop reason: HOSPADM

## 2022-05-08 RX ORDER — ACETAMINOPHEN 325 MG/1
650 TABLET ORAL
Status: DISCONTINUED | OUTPATIENT
Start: 2022-05-08 | End: 2022-05-10 | Stop reason: HOSPADM

## 2022-05-08 RX ORDER — SODIUM CHLORIDE 0.9 % (FLUSH) 0.9 %
5-40 SYRINGE (ML) INJECTION EVERY 8 HOURS
Status: DISCONTINUED | OUTPATIENT
Start: 2022-05-08 | End: 2022-05-10 | Stop reason: HOSPADM

## 2022-05-08 RX ORDER — ACETAMINOPHEN 650 MG/1
650 SUPPOSITORY RECTAL
Status: DISCONTINUED | OUTPATIENT
Start: 2022-05-08 | End: 2022-05-10 | Stop reason: HOSPADM

## 2022-05-08 RX ORDER — ONDANSETRON 2 MG/ML
4 INJECTION INTRAMUSCULAR; INTRAVENOUS
Status: DISCONTINUED | OUTPATIENT
Start: 2022-05-08 | End: 2022-05-10 | Stop reason: HOSPADM

## 2022-05-08 RX ORDER — POLYETHYLENE GLYCOL 3350 17 G/17G
17 POWDER, FOR SOLUTION ORAL DAILY PRN
Status: DISCONTINUED | OUTPATIENT
Start: 2022-05-08 | End: 2022-05-10 | Stop reason: HOSPADM

## 2022-05-08 RX ORDER — GUAIFENESIN 100 MG/5ML
324 LIQUID (ML) ORAL
Status: COMPLETED | OUTPATIENT
Start: 2022-05-08 | End: 2022-05-08

## 2022-05-08 RX ORDER — AMLODIPINE BESYLATE 5 MG/1
10 TABLET ORAL DAILY
Status: DISCONTINUED | OUTPATIENT
Start: 2022-05-08 | End: 2022-05-10 | Stop reason: HOSPADM

## 2022-05-08 RX ADMIN — ASPIRIN 81 MG 324 MG: 81 TABLET ORAL at 10:31

## 2022-05-08 RX ADMIN — AMLODIPINE BESYLATE 10 MG: 5 TABLET ORAL at 11:35

## 2022-05-08 NOTE — PROGRESS NOTES
Patient resting in bed, hypertensive, orders for labs this evening. Problem: Falls - Risk of  Goal: *Absence of Falls  Description: Document Corey Chilel Fall Risk and appropriate interventions in the flowsheet.   Outcome: Progressing Towards Goal  Note: Fall Risk Interventions:            Medication Interventions: Evaluate medications/consider consulting pharmacy,Patient to call before getting OOB,Teach patient to arise slowly    Elimination Interventions: Patient to call for help with toileting needs              Problem: Patient Education: Go to Patient Education Activity  Goal: Patient/Family Education  Outcome: Progressing Towards Goal

## 2022-05-08 NOTE — ED NOTES
TRANSFER - OUT REPORT:    Verbal report given to DARRYL IBARRA [facility], RN on Roberta Phalen  being transferred to  for routine progression of care       Report consisted of patients Situation, Background, Assessment and   Recommendations(SBAR). Information from the following report(s) ED Summary was reviewed with the receiving nurse. Lines:   Peripheral IV 05/08/22 Right Antecubital (Active)   Site Assessment Clean, dry, & intact 05/08/22 0904   Phlebitis Assessment 0 05/08/22 0904   Infiltration Assessment 0 05/08/22 0904   Dressing Status Clean, dry, & intact 05/08/22 0904   Dressing Type Tape;Transparent 05/08/22 0904   Hub Color/Line Status Pink;Capped;Flushed;Patent 05/08/22 0904   Action Taken Blood drawn 05/08/22 0904   Alcohol Cap Used Yes 05/08/22 0659        Opportunity for questions and clarification was provided.

## 2022-05-08 NOTE — PROGRESS NOTES
TRANSFER - IN REPORT:    Verbal report received from [address] [place] [address] (name) on Catarina Pace  being received from ED (unit) for routine progression of care      Report consisted of patients Situation, Background, Assessment and   Recommendations(SBAR). Information from the following report(s) SBAR, Kardex, [facility] and Recent Results was reviewed with the receiving nurse. Opportunity for questions and clarification was provided. Assessment completed upon patients arrival to unit and care assumed.

## 2022-05-08 NOTE — H&P
[address] [facility]  History and Physical    Date of Service:  5/8/2022  Primary Care Provider: None  Source of information: The patient and Chart review    Chief Complaint: Hypertension      History of Presenting Illness:   Renu Kaufman is a 73 y.o. male who presents with a history of 2 days of elevated blood pressure and he ran out of his home medications for diabetes and hypertension. He has an appointment to see a new primary care physician this Thursday at [facility] primary care but came into the emergency room today for further evaluation. He currently denies any chest pain shortness of breath or leg swelling-he does have some occasional headaches for which she took Excedrin-and he does have some episodes of diaphoresis which he attributes to the elevated outside temperature and exertion. At home his blood pressure systolics have been in the 170s to 190s he ran out of his medications approximately 1 month ago and his primary care physician would no longer refill his medications. He denies any pain with exertion-he only has episodes of diaphoresis when temperature is elevated or when he is exerting himself at work. No family history of heart disease but there are numerous family members with diabetes and hypertension. Patient has past labs indicate elevated LDL levels but he has never been started on a statin medication by his primary care physicians. Emergency room physician recommended admission and evaluation for elevated troponin I labs. REVIEW OF SYSTEMS:  A comprehensive review of systems was negative except for that written in the History of Present Illness.      Past Medical History:   Diagnosis Date    Hypertension    Type 2 diabetes on oral medications only, hyperlipidemia,  BPH history of elevated PSA, osteoarthritis primarily affecting the back and the knees, low vitamin D levels,    Past Surgical History:   Procedure Laterality Date     ORTHOPAEDIC Achilles Tendon/ left foot      Prior to Admission medications    Medication Sig Start Date End Date Taking? Authorizing Provider   amLODIPine (NORVASC) 10 mg tablet TAKE 1 TABLET BY MOUTH EVERY DAY 2/7/22   Ace Brody MD   dutasteride (AVODART) 0.5 mg capsule Take 1 Capsule by mouth daily. 12/3/21   Ace Brody MD   glimepiride (AMARYL) 1 mg tablet Take 1 Tablet by mouth Daily (before breakfast). 12/2/21   Ace Brody MD     Allergies   Allergen Reactions    Metformin Diarrhea    Prednisone Other (comments)     Hiccups        Family History   Problem Relation Age of Onset    Diabetes Mother     Diabetes Sister     Hypertension Sister     Diabetes Brother     Hypertension Brother       Social History:  reports that he has never smoked. He has never used smokeless tobacco. He reports that he does not drink alcohol and does not use drugs. Family and social history were personally reviewed, all pertinent and relevant details are outlined as above.   Social history-patient is  lives at home with his wife he is active and working for [employer] has 4 children 2 boys and 2 girls  He is independently mobile does not use any assistive devices and has no physical limitations    Objective:     Visit Vitals  BP (!) 144/84   Pulse (!) 57   Temp 97.8 °F (36.6 °C)   Resp 16   SpO2 98%      O2 Device: None (Room air)    Patient Vitals for the past 24 hrs:   Temp Pulse Resp BP SpO2   05/08/22 1135  (!) 57  (!) 144/84    05/08/22 0838 97.8 °F (36.6 °C) 70 16 (!) 169/89 98 %     PHYSICAL EXAM:   General: Alert x oriented x 3, awake, no acute distress, resting in bed, pleasant male, appears to be stated age  [de-identified]: PEERL, EOMI, moist mucus membranes  Neck: Supple, no JVD, no meningeal signs  Chest: Clear to auscultation bilaterally   CVS: RRR, S1 S2 heard, no murmurs/rubs/gallops  Abd: Soft, non-tender, non-distended, +bowel sounds   Ext: No clubbing, no cyanosis, no edema  Neuro/Psych: Pleasant mood and affect, CN 2-12 grossly intact, sensory grossly within normal limit, Strength 5/5 in all extremities, DTR 1+ x 4  Skin: Warm, dry, without rashes or lesions    Data Review: All diagnostic labs and studies have been reviewed. Recent Results (from the past 24 hour(s))   EKG, 12 LEAD, INITIAL    Collection Time: 05/08/22  8:45 AM   Result Value Ref Range    Ventricular Rate 67 BPM    Atrial Rate 67 BPM    P-R Interval 164 ms    QRS Duration 88 ms    Q-T Interval 394 ms    QTC Calculation (Bezet) 416 ms    Calculated P Axis 58 degrees    Calculated R Axis -51 degrees    Calculated T Axis 9 degrees    Diagnosis       Normal sinus rhythm  Left anterior fascicular block  Abnormal ECG  No previous ECGs available     CBC WITH AUTOMATED DIFF    Collection Time: 05/08/22  9:00 AM   Result Value Ref Range    WBC 5.5 4.1 - 11.1 K/uL    RBC 4.94 4.10 - 5.70 M/uL    HGB 15.5 12.1 - 17.0 g/dL    HCT 45.2 36.6 - 50.3 %    MCV 91.5 80.0 - 99.0 FL    MCH 31.4 26.0 - 34.0 PG    MCHC 34.3 30.0 - 36.5 g/dL    RDW 14.1 11.5 - 14.5 %    PLATELET 598 162 - 823 K/uL    MPV 10.9 8.9 - 12.9 FL    NRBC 0.0 0  WBC    ABSOLUTE NRBC 0.00 0.00 - 0.01 K/uL    NEUTROPHILS 38 32 - 75 %    LYMPHOCYTES 48 12 - 49 %    MONOCYTES 6 5 - 13 %    EOSINOPHILS 7 0 - 7 %    BASOPHILS 1 0 - 1 %    IMMATURE GRANULOCYTES 0 0.0 - 0.5 %    ABS. NEUTROPHILS 2.1 1.8 - 8.0 K/UL    ABS. LYMPHOCYTES 2.6 0.8 - 3.5 K/UL    ABS. MONOCYTES 0.3 0.0 - 1.0 K/UL    ABS. EOSINOPHILS 0.4 0.0 - 0.4 K/UL    ABS. BASOPHILS 0.0 0.0 - 0.1 K/UL    ABS. IMM.  GRANS. 0.0 0.00 - 0.04 K/UL    DF AUTOMATED     METABOLIC PANEL, COMPREHENSIVE    Collection Time: 05/08/22  9:00 AM   Result Value Ref Range    Sodium 138 136 - 145 mmol/L    Potassium 3.9 3.5 - 5.1 mmol/L    Chloride 107 97 - 108 mmol/L    CO2 27 21 - 32 mmol/L    Anion gap 4 (L) 5 - 15 mmol/L    Glucose 109 (H) 65 - 100 mg/dL    BUN 14 6 - 20 MG/DL    Creatinine 1.07 0.70 - 1.30 MG/DL    BUN/Creatinine ratio 13 12 - 20      GFR est AA >60 >60 ml/min/1.73m2    GFR est non-AA >60 >60 ml/min/1.73m2    Calcium 8.9 8.5 - 10.1 MG/DL    Bilirubin, total 0.4 0.2 - 1.0 MG/DL    ALT (SGPT) 40 12 - 78 U/L    AST (SGOT) 25 15 - 37 U/L    Alk. phosphatase 71 45 - 117 U/L    Protein, total 8.8 (H) 6.4 - 8.2 g/dL    Albumin 3.8 3.5 - 5.0 g/dL    Globulin 5.0 (H) 2.0 - 4.0 g/dL    A-G Ratio 0.8 (L) 1.1 - 2.2     SAMPLES BEING HELD    Collection Time: 05/08/22  9:00 AM   Result Value Ref Range    SAMPLES BEING HELD 1 RED     COMMENT        Add-on orders for these samples will be processed based on acceptable specimen integrity and analyte stability, which may vary by analyte. TROPONIN-HIGH SENSITIVITY    Collection Time: 05/08/22  9:02 AM   Result Value Ref Range    Troponin-High Sensitivity 126 (HH) 0 - 76 ng/L       All Micro Results     None          IMAGING:   CT HEAD WO CONT   Final Result   No acute changes. XR CHEST PA LAT   Final Result   No change no acute findings. ECG/ECHO:    Results for orders placed or performed during the hospital encounter of 05/08/22   EKG, 12 LEAD, INITIAL   Result Value Ref Range    Ventricular Rate 67 BPM    Atrial Rate 67 BPM    P-R Interval 164 ms    QRS Duration 88 ms    Q-T Interval 394 ms    QTC Calculation (Bezet) 416 ms    Calculated P Axis 58 degrees    Calculated R Axis -51 degrees    Calculated T Axis 9 degrees    Diagnosis       Normal sinus rhythm  Left anterior fascicular block  Abnormal ECG  No previous ECGs available          Assessment:   Given the patient's current clinical presentation, there is a high level of concern for decompensation if discharged from the emergency department. Complex decision making was performed, which includes reviewing the patient's available past medical records, laboratory results, and imaging studies.     Active Problems:    HTN (hypertension) (5/8/2022)      Elevated troponin I level (5/8/2022)      Plan: 1.  Elevated troponin I-admit for observation and cardiac evaluation-monitor on telemetry, repeat troponin levels ordered at 3 and 6 hours after first and second labs, repeat EKG at 6-hour emiliana, patient is asymptomatic-325 aspirin given in the emergency room; I will avoid beta-blocker at this time due to patient's resting heart rates when I am seeing him which are 59 to 60 bpm.  Consider cardiology consultation if repeat troponins show elevation  2. Essential hypertension uncontrolled-patient ran out of his home antihypertensive medication amlodipine 10 mg and is primary care physician would no longer refill the medication-we will resume patient's previous med at home dose-and monitor blood pressure    3. Hyperlipidemia-patient was not aware that his LDLs have been mildly elevated-and he was never recommended or offered to be started on a statin medication-repeat fasting lipid panel in a.m. tomorrow  4. Diabetes mellitus type 2-currently diet controlled only as patient ran out of his home medication glimepiride-1 mg daily  Check hemoglobin A1c-no further diabetes med orders at this time-plan to resume glimepiride on discharge  5. History of osteoarthritis-no pain reported on admission  6. History of BPH and elevated PSA-patient no longer taking Avodart-consider resumption of this medication or switch to Proscar  She is a full code surrogate decision maker is patient's wife listed in the chart    DIET: ADULT DIET Regular; 4 carb choices (60 gm/meal); No Salt Added (3-4 gm)   ISOLATION PRECAUTIONS: There are currently no Active Isolations  CODE STATUS: Full Code   DVT PROPHYLAXIS: SCDs  FUNCTIONAL STATUS PRIOR TO HOSPITALIZATION: Fully active and ambulatory; able to carry on all self-care without restriction. EARLY MOBILITY ASSESSMENT: Recommend routine ambulation while hospitalized with the assistance of nursing staff  ANTICIPATED DISCHARGE: 24-48 hours.   EMERGENCY CONTACT/SURROGATE DECISION MAKER: see above      Signed By: Patrick Del Real MD     May 8, 2022

## 2022-05-08 NOTE — ED PROVIDER NOTES
Patient is a 36-year-old male with past medical history of hypertension, diabetes who presents for evaluation of high blood pressure readings. He reports that for approximately 2 months, he has been off of his medication, amlodipine 10 mg once daily. He reports that his PCP said that he missed too many appointments and would no longer see him as a patient and would not provide him any refills of his medication. Over the last couple days, he has been checking his blood pressure and the readings have been in the 190s. The lowest reading he has gotten is approximately 602 systolic. He denies any chest pain, shortness of breath, lower extremity edema. However, he does endorse some frontal headaches and diaphoresis that have been ongoing for the past few days. He denies any dizziness or visual changes. He describes his headache as mild to moderate in nature. He does have a new appointment with a primary care provider on Thursday, but they told him they would not restart medication until they had lab results back after his appointment.                    Past Medical History:   Diagnosis Date    Hypertension        Past Surgical History:   Procedure Laterality Date    HX ORTHOPAEDIC      Achilles Tendon/ left foot          Family History:   Problem Relation Age of Onset    Diabetes Mother     Diabetes Sister     Hypertension Sister     Diabetes Brother     Hypertension Brother        Social History     Socioeconomic History    Marital status:      Spouse name: Not on file    Number of children: Not on file    Years of education: Not on file    Highest education level: Not on file   Occupational History    Not on file   Tobacco Use    Smoking status: Never Smoker    Smokeless tobacco: Never Used   Vaping Use    Vaping Use: Never used   Substance and Sexual Activity    Alcohol use: No    Drug use: No    Sexual activity: Yes   Other Topics Concern    Not on file   Social History Narrative  Not on file     Social Determinants of Health     Financial Resource Strain:     Difficulty of Paying Living Expenses: Not on file   Food Insecurity:     Worried About Running Out of Food in the Last Year: Not on file    Tramaine of Food in the Last Year: Not on file   Transportation Needs:     Lack of Transportation (Medical): Not on file    Lack of Transportation (Non-Medical): Not on file   Physical Activity:     Days of Exercise per Week: Not on file    Minutes of Exercise per Session: Not on file   Stress:     Feeling of Stress : Not on file   Social Connections:     Frequency of Communication with Friends and Family: Not on file    Frequency of Social Gatherings with Friends and Family: Not on file    Attends Faith Services: Not on file    Active Member of [address] [place] [address] or Organizations: Not on file    Attends Club or Organization Meetings: Not on file    Marital Status: Not on file   Intimate Partner Violence:     Fear of Current or Ex-Partner: Not on file    Emotionally Abused: Not on file    Physically Abused: Not on file    Sexually Abused: Not on file   Housing Stability:     Unable to Pay for Housing in the Last Year: Not on file    Number of [place] in the Last Year: Not on file    Unstable Housing in the Last Year: Not on file         ALLERGIES: Metformin and Prednisone    Review of Systems   Constitutional: Negative for fever and unexpected weight change. HENT: Negative for congestion. Eyes: Negative for visual disturbance. Respiratory: Negative for cough, chest tightness and shortness of breath. Cardiovascular: Negative for chest pain, palpitations and leg swelling. Gastrointestinal: Negative for abdominal pain, nausea and vomiting. Endocrine: Negative for polyuria. Genitourinary: Negative for dysuria and flank pain. Musculoskeletal: Negative for back pain, neck pain and neck stiffness. Skin: Negative for color change.    Allergic/Immunologic: Negative for immunocompromised state. Neurological: Positive for headaches. Negative for dizziness, facial asymmetry, weakness and light-headedness. Hematological: Negative for adenopathy. Psychiatric/Behavioral: Negative for agitation. Vitals:    05/08/22 0838   BP: (!) 169/89   Pulse: 70   Resp: 16   Temp: 97.8 °F (36.6 °C)   SpO2: 98%            Physical Exam  Vitals and nursing note reviewed. Constitutional:       Appearance: Normal appearance. He is normal weight. HENT:      Head: Atraumatic. Eyes:      General: No visual field deficit. Extraocular Movements: Extraocular movements intact. Conjunctiva/sclera: Conjunctivae normal.      Pupils: Pupils are equal, round, and reactive to light. Cardiovascular:      Rate and Rhythm: Normal rate and regular rhythm. Pulses: Normal pulses. Heart sounds: Normal heart sounds. Pulmonary:      Effort: Pulmonary effort is normal.      Breath sounds: Normal breath sounds. Abdominal:      General: Abdomen is flat. Bowel sounds are normal.      Tenderness: There is no abdominal tenderness. Musculoskeletal:         General: Normal range of motion. Cervical back: Normal range of motion and neck supple. No rigidity or tenderness. Right lower leg: No edema. Left lower leg: No edema. Skin:     General: Skin is warm and dry. Capillary Refill: Capillary refill takes less than 2 seconds. Neurological:      General: No focal deficit present. Mental Status: He is alert and oriented to person, place, and time. Mental status is at baseline. GCS: GCS eye subscore is 4. GCS verbal subscore is 5. GCS motor subscore is 6. Cranial Nerves: Cranial nerves are intact. No facial asymmetry. Sensory: Sensation is intact. No sensory deficit. Motor: Motor function is intact. No pronator drift. Coordination: Coordination is intact. Finger-Nose-Finger Test normal.      Gait: Gait is intact.    Psychiatric:         Mood and Affect: Mood normal.         Behavior: Behavior normal.          MDM  Number of Diagnoses or Management Options  Hypertension, unspecified type  NSTEMI (non-ST elevated myocardial infarction) [facility])  Diagnosis management comments: Patient presents with hypertension, symptomatic with headache. Will obtain labs and imaging to rule out endorgan damage. If work-up negative today, will provide short refill of his medication. ED EKG interpretation:8:51 AM  Rhythm: normal sinus rhythm; and regular. Rate (approx.): 67; Axis: normal; P wave: normal; ST/T wave: no concerning ST elevations or depressions; Other findings: unremarkable. EKG has also been evaluated by attending ED physician. 1008 -troponin elevated to 126. Labs otherwise unremarkable, imaging with no acute findings. Aspirin ordered, repeat troponin ordered at 2-hour emiliana. Will admit patient for further monitoring and management. Dc Banks NP       Amount and/or Complexity of Data Reviewed  Clinical lab tests: ordered and reviewed  Tests in the radiology section of CPT®: ordered and reviewed    Patient Progress  Patient progress: stable         Procedures               Perfect Serve Consult for Admission  10:11 AM    ED Room Number: R36/R36  Patient Name and age:  David Beckford 73 y.o.  male  Working Diagnosis:   1. Hypertension, unspecified type    2. NSTEMI (non-ST elevated myocardial infarction) ([facility] [address]        COVID-19 Suspicion:  no  Sepsis present:  no  Reassessment needed: no  Code Status:  Full Code  Readmission: no  Isolation Requirements:  no  Recommended Level of Care:  step down  Department:  [facility] ED - 21     Other:  69-year-old male with pmh of HTN, DM presents for evaluation of symptomatic HTN. Off of home amlodipine for past 2-3 months due to discharge from PCP (issue with noncompliance in showing up for appts) Symptomatic with headache, diaphoresis x 2 days. Trop elevated to 126.  No ST elevation on EKG

## 2022-05-08 NOTE — ED TRIAGE NOTES
Pt presents to department with a CC of high BP readings for the past 2 days. Pt reports he hasn't been able to see his PCP for prescription refills. The last time he took his prescribed BP meds was 3 months ago. Pt states he has appt with new PCP on Thursday but won't prescribe him his current meds until after he receives blood work.     BP in triage:    right arm: 190/90  Left arm: 169/89

## 2022-05-09 ENCOUNTER — APPOINTMENT (OUTPATIENT)
Dept: NUCLEAR MEDICINE | Age: 67
End: 2022-05-09
Attending: FAMILY MEDICINE
Payer: MEDICARE

## 2022-05-09 ENCOUNTER — APPOINTMENT (OUTPATIENT)
Dept: NON INVASIVE DIAGNOSTICS | Age: 67
End: 2022-05-09
Attending: FAMILY MEDICINE
Payer: MEDICARE

## 2022-05-09 LAB
ANION GAP SERPL CALC-SCNC: 3 MMOL/L (ref 5–15)
ATRIAL RATE: 61 BPM
BUN SERPL-MCNC: 16 MG/DL (ref 6–20)
BUN/CREAT SERPL: 14 (ref 12–20)
CALCIUM SERPL-MCNC: 8.5 MG/DL (ref 8.5–10.1)
CALCULATED P AXIS, ECG09: 46 DEGREES
CALCULATED R AXIS, ECG10: -49 DEGREES
CALCULATED T AXIS, ECG11: -3 DEGREES
CHLORIDE SERPL-SCNC: 107 MMOL/L (ref 97–108)
CHOLEST SERPL-MCNC: 166 MG/DL
CO2 SERPL-SCNC: 26 MMOL/L (ref 21–32)
CREAT SERPL-MCNC: 1.13 MG/DL (ref 0.7–1.3)
DIAGNOSIS, 93000: NORMAL
EST. AVERAGE GLUCOSE BLD GHB EST-MCNC: 137 MG/DL
GLUCOSE BLD STRIP.AUTO-MCNC: 108 MG/DL (ref 65–117)
GLUCOSE BLD STRIP.AUTO-MCNC: 78 MG/DL (ref 65–117)
GLUCOSE SERPL-MCNC: 112 MG/DL (ref 65–100)
HBA1C MFR BLD: 6.4 % (ref 4–5.6)
HDLC SERPL-MCNC: 45 MG/DL
HDLC SERPL: 3.7 {RATIO} (ref 0–5)
LDLC SERPL CALC-MCNC: 102.8 MG/DL (ref 0–100)
MAGNESIUM SERPL-MCNC: 2.3 MG/DL (ref 1.6–2.4)
P-R INTERVAL, ECG05: 180 MS
POTASSIUM SERPL-SCNC: 4.6 MMOL/L (ref 3.5–5.1)
Q-T INTERVAL, ECG07: 402 MS
QRS DURATION, ECG06: 90 MS
QTC CALCULATION (BEZET), ECG08: 404 MS
SERVICE CMNT-IMP: NORMAL
SERVICE CMNT-IMP: NORMAL
SODIUM SERPL-SCNC: 136 MMOL/L (ref 136–145)
STRESS BASELINE DIAS BP: 88 MMHG
STRESS BASELINE HR: 64 BPM
STRESS BASELINE ST DEPRESSION: 0 MM
STRESS BASELINE SYS BP: 153 MMHG
STRESS ESTIMATED WORKLOAD: 1 METS
STRESS EXERCISE DUR MIN: 3 MIN
STRESS EXERCISE DUR SEC: 0 SEC
STRESS PEAK DIAS BP: 83 MMHG
STRESS PEAK SYS BP: 190 MMHG
STRESS PERCENT HR ACHIEVED: 62 %
STRESS POST PEAK HR: 96 BPM
STRESS RATE PRESSURE PRODUCT: NORMAL BPM*MMHG
STRESS STAGE 1 DURATION: 1 MIN:SEC
STRESS STAGE 1 HR: 73 BPM
STRESS STAGE 2 BP: NORMAL MMHG
STRESS STAGE 2 DURATION: 1 MIN:SEC
STRESS STAGE 2 HR: 96 BPM
STRESS STAGE 3 DURATION: 1 MIN:SEC
STRESS STAGE 3 HR: 87 BPM
STRESS STAGE 4 DURATION: 1 MIN:SEC
STRESS STAGE RECOVERY 1 BP: NORMAL MMHG
STRESS STAGE RECOVERY 1 DURATION: 1 MIN:SEC
STRESS STAGE RECOVERY 1 HR: 83 BPM
STRESS STAGE RECOVERY 2 DURATION: 1 MIN:SEC
STRESS STAGE RECOVERY 2 HR: 82 BPM
STRESS STAGE RECOVERY 3 BP: NORMAL MMHG
STRESS STAGE RECOVERY 3 DURATION: 1 MIN:SEC
STRESS STAGE RECOVERY 3 HR: 78 BPM
STRESS TARGET HR: 155 BPM
TRIGL SERPL-MCNC: 91 MG/DL (ref ?–150)
TROPONIN-HIGH SENSITIVITY: 128 NG/L (ref 0–76)
VENTRICULAR RATE, ECG03: 61 BPM
VLDLC SERPL CALC-MCNC: 18.2 MG/DL

## 2022-05-09 PROCEDURE — 84484 ASSAY OF TROPONIN QUANT: CPT

## 2022-05-09 PROCEDURE — 78452 HT MUSCLE IMAGE SPECT MULT: CPT

## 2022-05-09 PROCEDURE — 80048 BASIC METABOLIC PNL TOTAL CA: CPT

## 2022-05-09 PROCEDURE — 36415 COLL VENOUS BLD VENIPUNCTURE: CPT

## 2022-05-09 PROCEDURE — 83036 HEMOGLOBIN GLYCOSYLATED A1C: CPT

## 2022-05-09 PROCEDURE — 74011250637 HC RX REV CODE- 250/637: Performed by: HOSPITALIST

## 2022-05-09 PROCEDURE — A9500 TC99M SESTAMIBI: HCPCS

## 2022-05-09 PROCEDURE — 74011000250 HC RX REV CODE- 250: Performed by: FAMILY MEDICINE

## 2022-05-09 PROCEDURE — 74011250636 HC RX REV CODE- 250/636: Performed by: FAMILY MEDICINE

## 2022-05-09 PROCEDURE — 74011250637 HC RX REV CODE- 250/637: Performed by: FAMILY MEDICINE

## 2022-05-09 PROCEDURE — 83735 ASSAY OF MAGNESIUM: CPT

## 2022-05-09 PROCEDURE — 99204 OFFICE O/P NEW MOD 45 MIN: CPT | Performed by: SPECIALIST

## 2022-05-09 PROCEDURE — 82962 GLUCOSE BLOOD TEST: CPT

## 2022-05-09 PROCEDURE — 80061 LIPID PANEL: CPT

## 2022-05-09 PROCEDURE — G0378 HOSPITAL OBSERVATION PER HR: HCPCS

## 2022-05-09 RX ORDER — INSULIN LISPRO 100 [IU]/ML
INJECTION, SOLUTION INTRAVENOUS; SUBCUTANEOUS
Status: DISCONTINUED | OUTPATIENT
Start: 2022-05-09 | End: 2022-05-10 | Stop reason: HOSPADM

## 2022-05-09 RX ORDER — SODIUM CHLORIDE 0.9 % (FLUSH) 0.9 %
20 SYRINGE (ML) INJECTION
Status: COMPLETED | OUTPATIENT
Start: 2022-05-09 | End: 2022-05-09

## 2022-05-09 RX ORDER — GUAIFENESIN 100 MG/5ML
81 LIQUID (ML) ORAL DAILY
Status: DISCONTINUED | OUTPATIENT
Start: 2022-05-09 | End: 2022-05-10 | Stop reason: HOSPADM

## 2022-05-09 RX ORDER — DUTASTERIDE 0.5 MG/1
0.5 CAPSULE, LIQUID FILLED ORAL DAILY
Status: DISCONTINUED | OUTPATIENT
Start: 2022-05-10 | End: 2022-05-10 | Stop reason: HOSPADM

## 2022-05-09 RX ORDER — TETRAKIS(2-METHOXYISOBUTYLISOCYANIDE)COPPER(I) TETRAFLUOROBORATE 1 MG/ML
10.6 INJECTION, POWDER, LYOPHILIZED, FOR SOLUTION INTRAVENOUS
Status: COMPLETED | OUTPATIENT
Start: 2022-05-09 | End: 2022-05-09

## 2022-05-09 RX ORDER — INSULIN LISPRO 100 [IU]/ML
INJECTION, SOLUTION INTRAVENOUS; SUBCUTANEOUS
Status: DISCONTINUED | OUTPATIENT
Start: 2022-05-09 | End: 2022-05-09

## 2022-05-09 RX ORDER — MAGNESIUM SULFATE 100 %
4 CRYSTALS MISCELLANEOUS AS NEEDED
Status: DISCONTINUED | OUTPATIENT
Start: 2022-05-09 | End: 2022-05-10 | Stop reason: HOSPADM

## 2022-05-09 RX ORDER — TETRAKIS(2-METHOXYISOBUTYLISOCYANIDE)COPPER(I) TETRAFLUOROBORATE 1 MG/ML
32.6 INJECTION, POWDER, LYOPHILIZED, FOR SOLUTION INTRAVENOUS
Status: COMPLETED | OUTPATIENT
Start: 2022-05-09 | End: 2022-05-09

## 2022-05-09 RX ADMIN — AMLODIPINE BESYLATE 10 MG: 5 TABLET ORAL at 09:00

## 2022-05-09 RX ADMIN — ASPIRIN 81 MG 81 MG: 81 TABLET ORAL at 15:48

## 2022-05-09 RX ADMIN — SODIUM CHLORIDE, PRESERVATIVE FREE 10 ML: 5 INJECTION INTRAVENOUS at 22:00

## 2022-05-09 RX ADMIN — TETRAKIS(2-METHOXYISOBUTYLISOCYANIDE)COPPER(I) TETRAFLUOROBORATE 32.6 MILLICURIE: 1 INJECTION, POWDER, LYOPHILIZED, FOR SOLUTION INTRAVENOUS at 11:35

## 2022-05-09 RX ADMIN — REGADENOSON 0.4 MG: 0.08 INJECTION, SOLUTION INTRAVENOUS at 11:45

## 2022-05-09 RX ADMIN — SODIUM CHLORIDE, PRESERVATIVE FREE 10 ML: 5 INJECTION INTRAVENOUS at 05:25

## 2022-05-09 RX ADMIN — TETRAKIS(2-METHOXYISOBUTYLISOCYANIDE)COPPER(I) TETRAFLUOROBORATE 10.6 MILLICURIE: 1 INJECTION, POWDER, LYOPHILIZED, FOR SOLUTION INTRAVENOUS at 08:00

## 2022-05-09 RX ADMIN — Medication 20 ML: at 11:45

## 2022-05-09 NOTE — PROGRESS NOTES
Patient up ad day, awaiting stress test, has been NPO since midnight, wife at bedside. Problem: Falls - Risk of  Goal: *Absence of Falls  Description: Document Jamin Perez Fall Risk and appropriate interventions in the flowsheet.   Outcome: Progressing Towards Goal  Note: Fall Risk Interventions:            Medication Interventions: Evaluate medications/consider consulting pharmacy,Patient to call before getting OOB,Teach patient to arise slowly    Elimination Interventions: Patient to call for help with toileting needs              Problem: Patient Education: Go to Patient Education Activity  Goal: Patient/Family Education  Outcome: Progressing Towards Goal

## 2022-05-09 NOTE — PROGRESS NOTES
Bedside shift change report given to [address]  (oncoming nurse) by Thea Miranda  (offgoing nurse). Report included the following information SBAR, Kardex, MAR and Recent Results.

## 2022-05-09 NOTE — PROGRESS NOTES
Hospital follow-up new PCP transitional care appointment has been scheduled with Cecil Oliveira NP for Thursday, 5/19/22 at 9:20 a.m. Pending patient discharge. Tessa Cade, Care Management Assistant.

## 2022-05-09 NOTE — PROGRESS NOTES
Transition of Care  1. RUR n/a  2. Disposition   Home   3. Transportation  Spouse Christoph   4. Follow Up PCP/Specialist     Reason for Admission:  Hypertension                      RUR Score:  N/a            Plan for utilizing home health:  No recommendations for hh          PCP: First and Last name:  None     Name of Practice:    Are you a current patient: Yes/No:    Approximate date of last visit:    Can you participate in a virtual visit with your PCP:                     Current Advanced Directive/Advance Care Plan: Full Code      Healthcare Decision Maker:  Spouse Christoph [phone]  Click here to complete [address] including selection of the Healthcare Decision Maker Relationship (ie \"Primary\")                             Transition of Care Plan:                      Care Management Interventions  PCP Verified by CM: Yes  Mode of Transport at Discharge: Other (see comment) (spouse Yany Ashton )  Transition of Care Consult (CM Consult):  (home)  Physical Therapy Consult: No  Occupational Therapy Consult: No  Speech Therapy Consult: No  Support Systems: Spouse/Significant Other  Confirm Follow Up Transport: Family  Discharge Location  Patient Expects to be Discharged to[de-identified] Home    CM spoke with patient at bedside to introduce self and explain role. Verified demographics, emergency contact, insurance, PCP- CM sent request to Jared Stack to establish PCP and schedule f/u per pt request     Living situation:  Home w/spouse. No steps to enter home, 7 steps up to bedroom. ADL's: Independent   DME:  No med equip   Pharmacy:  [address]   Discharge transportation: spouse Christoph   PT/OT/Rehab/HH history: no recent or prev hh or rehab    CM to monitor for any further needs.       Kimberly Daley RN BSN [facility]

## 2022-05-09 NOTE — PROGRESS NOTES
[facility] Adult  Hospitalist Group                                                                                          Hospitalist Progress Note  Aaron Valencia MD  Answering service: [phone] from in house phone        Date of Service:  2022  NAME:  Lilly Mariee  :  1955  MRN:  [id number]      Admission Summary:     Lilly Mariee is a 73 y.o. male who presents with a history of 2 days of elevated blood pressure and he ran out of his home medications for diabetes and hypertension. He has an appointment to see a new primary care physician this Thursday at [facility] primary care but came into the emergency room for further evaluation. He currently denies any chest pain shortness of breath or leg swelling-he does have some occasional headaches for which she took Excedrin-and he does have some episodes of diaphoresis which he attributes to the elevated outside temperature and exertion. At home his blood pressure systolics have been in the 170s to 190s he ran out of his medications approximately 1 month ago and his primary care physician would no longer refill his medications. He denies any pain with exertion-he only has episodes of diaphoresis when temperature is elevated or when he is exerting himself at work. No family history of heart disease but there are numerous family members with diabetes and hypertension. Patient has past labs indicate elevated LDL levels but he has never been started on a statin medication by his primary care physicians. Emergency room physician recommended admission and evaluation for elevated troponin I labs. Interval history / Subjective:     He said he feels better, no headache, left side chest pain, palpitation, exertional dyspnea or lower extremities edema.       Assessment & Plan:     Elevated troponin possible due to NSTEMI vs hypertensive cardiomyopathy   -add aspirin,   -no chest pain or exertional dyspnea  -troponin elevated but falat, last one was 128, initial 128  -EKG normal sinus vent rate 67 bpm non specific st t wave  -stress test No evidence of myocardial ischemia or infarction. Left ventricular ejection fraction is 42 %. Global hypokinesia. -lipid panel .8 , HDL 45  -echo pending   -cardiologist is consulted     HTN uncontrolled   -improved and BP normal  -on norvasc    Sinus Bradycardia   -on monitor his HR as low as 43, PVS  -not on BB or CCB  -no dizziness  -cardiologist is consulted    T2DM    -A1c 6.4  -continue sliding scale and monitor finger stick glucose    Hx of hyperlipidemia   -lipid panel .8    Hx of OA  -stable  -on prn tylenol    Hx of BPH   -stable  -add home avodart      CM consulted for new PCP           Code status: Full Code  Prophylaxis: Lovenox  Care Plan discussed with: Patient, his wife at bedside, Nurse and CM  Anticipated Disposition: Home with [facility] Problems  Date Reviewed: 12/1/2021          Codes Class Noted POA    HTN (hypertension) ICD-10-CM: I10  ICD-9-CM: 401.9  5/8/2022 Unknown        Elevated troponin I level ICD-10-CM: R77.8  ICD-9-CM: 790.6  5/8/2022 Unknown              Vital Signs:    Last 24hrs VS reviewed since prior progress note. Most recent are:  Visit Vitals  /85 (BP 1 Location: Right upper arm, BP Patient Position: At rest)   Pulse (!) 57   Temp 97.9 °F (36.6 °C)   Resp 20   Ht 5' 9\" (1.753 m)   Wt 102.5 kg (226 lb)   SpO2 94%   BMI 33.37 kg/m²         Intake/Output Summary (Last 24 hours) at 5/9/2022 1112  Last data filed at 5/9/2022 1956  Gross per 24 hour   Intake 0 ml   Output    Net 0 ml        Physical Examination:     I had a face to face encounter with this patient and independently examined them on 5/9/2022 as outlined below:          Constitutional:  No acute distress, cooperative, pleasant    ENT:  Oral mucosa moist, oropharynx benign. Resp:  CTA bilaterally. No wheezing/rhonchi/rales. No accessory muscle use.     CV:  Regular rhythm, normal rate, no murmurs, gallops, rubs    GI:  Soft, non distended, non tender. normoactive bowel sounds, no hepatosplenomegaly     Musculoskeletal:  No edema,     Neurologic:  Moves all extremities. AAOx3, CN II-XII reviewed            Data Review:    Review and/or order of clinical lab test  Review and/or order of tests in the radiology section of [facility]  Review and/or order of tests in the medicine section of [facility]      Labs:     Recent Labs     05/08/22  0900   WBC 5.5   HGB 15.5   HCT 45.2        Recent Labs     05/09/22  0547 05/08/22  0900    138   K 4.6 3.9    107   CO2 26 27   BUN 16 14   CREA 1.13 1.07   * 109*   CA 8.5 8.9   MG 2.3  --      Recent Labs     05/08/22  0900   ALT 40   AP 71   TBILI 0.4   TP 8.8*   ALB 3.8   GLOB 5.0*     No results for input(s): INR, PTP, APTT, INREXT in the last 72 hours. No results for input(s): FE, TIBC, PSAT, FERR in the last 72 hours. No results found for: FOL, RBCF   No results for input(s): PH, PCO2, PO2 in the last 72 hours. No results for input(s): CPK, CKNDX, TROIQ in the last 72 hours.     No lab exists for component: CPKMB  Lab Results   Component Value Date/Time    Cholesterol, total 166 05/09/2022 05:47 AM    HDL Cholesterol 45 05/09/2022 05:47 AM    LDL, calculated 102.8 (H) 05/09/2022 05:47 AM    Triglyceride 91 05/09/2022 05:47 AM    CHOL/HDL Ratio 3.7 05/09/2022 05:47 AM     Lab Results   Component Value Date/Time    Glucose (POC) 117 (H) 04/14/2017 08:54 AM     Lab Results   Component Value Date/Time    Color Yellow 07/16/2020 12:03 PM    Appearance Clear 07/16/2020 12:03 PM    Specific gravity 1.008 07/14/2015 07:59 PM    pH (UA) 5.0 07/16/2020 12:03 PM    Protein NEGATIVE  07/14/2015 07:59 PM    Glucose NEGATIVE  07/14/2015 07:59 PM    Ketone Negative 07/16/2020 12:03 PM    Bilirubin Negative 07/16/2020 12:03 PM    Urobilinogen 0.2 07/14/2015 07:59 PM    Nitrites Negative 07/16/2020 12:03 PM    Leukocyte Esterase Negative 07/16/2020 12:03 PM    Epithelial cells FEW 07/14/2015 07:59 PM    Bacteria NEGATIVE  07/14/2015 07:59 PM    WBC 0-4 07/14/2015 07:59 PM    RBC 0-5 07/14/2015 07:59 PM         Medications Reviewed:     Current Facility-Administered Medications   Medication Dose Route Frequency    regadenoson (LEXISCAN) injection 0.4 mg  0.4 mg IntraVENous RAD ONCE    saline peripheral flush soln 20 mL  20 mL InterCATHeter RAD ONCE    sodium chloride (NS) flush 5-40 mL  5-40 mL IntraVENous Q8H    sodium chloride (NS) flush 5-40 mL  5-40 mL IntraVENous PRN    acetaminophen (TYLENOL) tablet 650 mg  650 mg Oral Q6H PRN    Or    acetaminophen (TYLENOL) suppository 650 mg  650 mg Rectal Q6H PRN    polyethylene glycol (MIRALAX) packet 17 g  17 g Oral DAILY PRN    ondansetron (ZOFRAN) injection 4 mg  4 mg IntraVENous Q6H PRN    amLODIPine (NORVASC) tablet 10 mg  10 mg Oral DAILY    hydrALAZINE (APRESOLINE) tablet 25 mg  25 mg Oral TID PRN     ______________________________________________________________________  EXPECTED LENGTH OF STAY: - - -  ACTUAL LENGTH OF STAY:          0                 Andrea Mg MD

## 2022-05-09 NOTE — CONSULTS
[facility]  Cardiology Care Note                                 Initial visit    Patient Name: Dallis Mortimer - :1955 - IIJ:[id number]  Primary Cardiologist: none  Consulting Cardiologist: Anselmo Street MD  Reason for Consult: elevated troponin     HPI: Mr. Porsche Donis is a 73 y.o. male with PMH of HTN, DM type II, BPH, arthritis, obesity who presents with chief c/o hypertension. He reportedly was diagnosed with HTN just over a year ago and has been on amlodipine. However, he ran out of his BP med over 1 month ago and was unable to see his PCP who would not refill it in the meantime. He came to ER because he took his BP at home and his systolic BP was as high as 200. Cardiology is consulted as his HS troponin is mildly elevated but flat trend ([phone]). He denies any history of chest discomfort, arm pain or jaw pain. Denies STANLEY. Works for a [employer] and does a lot of walking on his job without exertional symptoms. Climbs stairs at home without chest pain or SOB. Denies BLE edema, orthopnea, PND. SH: never smoker, no ETOH use. FH: Mom and sister have DM. No history of early CAD in family. Assessment and Plan     1. Elevated HS troponin:  HS troponin mildly elevated with flat trend. No chest pain or exertional symptoms. Negra Jakob nuclear stress test today with no evidence of ischemia or infarction but EF read at 42% and noted global hypokinesis. Will obtain echocardiogram to more accurately assess LV function but suspect that mildly elevated troponin with flat trend is due to demand of uncontrolled hypertension. Further recommendations after echocardiogram.     2. HTN: bp elevated this afternoon. It appears, he did not receive his amlodipine today. Resume amlodipine 10 mg po daily. 3. Obesity: Body mass index is 33.37 kg/m². diet, exercise.    4. DM type II: A1C=6.4  Defer to primary team.   Patient seen on the day of progress note and examined  and agree with Advance Practice Provider (EDITH, NP,PA)  assessment and plans. Patient reports no chest pain or shortness of breath or palpitations. He is very active at the gym 3 times a week with no issues. Main reason he came to the hospital for hypertension and inability to obtain medications. We have discussed results of nuclear stress test with no ischemia and reduction in ejection fraction. We have discussed the potential underestimation of ejection fraction by nuclear stress test.    Proceed with echocardiogram.    Resume amlodipine. If low ejection fraction this may have been due to uncontrolled hypertension. This will be further reevaluated after transthoracic echocardiogram.      Cardiac testing:  NUCLEAR CARDIAC STRESS TEST 05/09/2022, 05/09/2022 5/9/2022  Interpretation Summary    ECG: Resting ECG demonstrates normal sinus rhythm.   ECG: Stress ECG was negative for ischemia.   Stress Test: A pharmacological stress test was performed using lexiscan. Blood pressure demonstrated a normal response and heart rate demonstrated a normal response to stress. The patient's heart rate recovery was normal. The patient reported no symptoms during the stress test.    Signed by: Ramone Yin MD on 5/9/2022 11:59 AM, Signed by: Rafael Mackay MD on 5/9/2022 12:45 PM       ____________________________________________________________      Patient Active Problem List   Diagnosis Code    Essential hypertension I10    Obesity (BMI 30.0-34. 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       [] Patient unable to provide secondary to condition    CONSTITUTIONAL: negative   ENT/MOUTH: negative  EYES: negative  CARDIOVASCULAR: negative except HTN  RESPIRATORY: negative  GASTROINTESTINAL: negative  GENITOURINARY: negative  MUSCULOSKELETAL: negative  SKIN: negative  NEUROLOGICAL: negative  PSYCHOLOGICAL: negative   HEME/LYMPH: negative  ENDOCRINE: negative        Past Medical History:   Diagnosis Date    Hypertension      Past Surgical History:   Procedure Laterality Date    HX ORTHOPAEDIC      Achilles Tendon/ left foot        Current Facility-Administered Medications:     aspirin chewable tablet 81 mg, 81 mg, Oral, DAILY, Delphine Tao MD    glucose chewable tablet 16 g, 4 Tablet, Oral, PRN, Steve Goodwin MD    glucagon (GLUCAGEN) injection 1 mg, 1 mg, IntraMUSCular, PRN, Steve Goodwin MD    dextrose 10 % infusion 0-250 mL, 0-250 mL, IntraVENous, PRN, Delphine Tao MD    insulin lispro (HUMALOG) injection, , SubCUTAneous, AC&HS, Delphine Tao MD    [START ON 5/10/2022] dutasteride (AVODART) capsule 0.5 mg, 0.5 mg, Oral, DAILY, Delphine Tao MD    sodium chloride (NS) flush 5-40 mL, 5-40 mL, IntraVENous, Q8H, Brandy Vela MD, 10 mL at 05/09/22 0525    sodium chloride (NS) flush 5-40 mL, 5-40 mL, IntraVENous, PRN, Brandy Vela MD    acetaminophen (TYLENOL) tablet 650 mg, 650 mg, Oral, Q6H PRN **OR** acetaminophen (TYLENOL) suppository 650 mg, 650 mg, Rectal, Q6H PRN, Brandy Vela MD    polyethylene glycol (MIRALAX) packet 17 g, 17 g, Oral, DAILY PRN, Brandy Vela MD    ondansetron [facility]) injection 4 mg, 4 mg, IntraVENous, Q6H PRN, Brandy Vela MD    amLODIPine (NORVASC) tablet 10 mg, 10 mg, Oral, DAILY, Brandy Vela MD, 10 mg at 05/08/22 1135    hydrALAZINE (APRESOLINE) tablet 25 mg, 25 mg, Oral, TID PRN, Brandy Vela MD    Allergies   Allergen Reactions    Metformin Diarrhea    Thorazine [Chlorpromazine] Shortness of Breath    Prednisone Other (comments)     Hiccups          FmHx: family history includes Diabetes in his brother, mother, and sister; Hypertension in his brother and sister. Social Hx :  reports that he has never smoked. He has never used smokeless tobacco. He reports that he does not drink alcohol and does not use drugs. Objective:    Physical Exam    Vitals:   Vitals:    05/09/22 0844 05/09/22 1000 05/09/22 1106 05/09/22 1251   BP: 135/85  (!) 153/88 (!) 166/90   Pulse: 64 (!) 57  63   Resp: 20   20   Temp: 97.9 °F (36.6 °C)   97.9 °F (36.6 °C)   SpO2: 94%   98%   Weight:   102.5 kg (226 lb)    Height:   5' 9\" (1.753 m)        General:    Alert, cooperative, no distress, appears stated age. Neck:   Supple,    Back:     Symmetric    Lungs:     Clear to auscultation bilaterally. Heart[de-identified]    Regular rate and rhythm, S1, S2 normal, no murmur, click, rub or gallop. Abdomen:     Soft, non-tender. Extremities:   Extremities normal, atraumatic, no cyanosis or edema. Vascular:   Pulses - 2+   Skin:   Skin color normal. No rashes or lesions on visible areas   Neurologic:   Alert, NIEVES       Telemetry: NSR- - review of telemetry- there are multiple telemetry alerts indicating HR in 40's but actual review of alerts demonstrate HR is in low 60's.       ECG:   EKG Results     Procedure 720 Value Units Date/Time    EKG, 12 LEAD, INITIAL [[id number]]     Order Status: Sent     EKG, 12 LEAD, INITIAL [[id number]] Collected: 05/08/22 0845    Order Status: Completed Updated: 05/08/22 1444     Ventricular Rate 67 BPM      Atrial Rate 67 BPM      P-R Interval 164 ms      QRS Duration 88 ms      Q-T Interval 394 ms      QTC Calculation (Bezet) 416 ms      Calculated P Axis 58 degrees      Calculated R Axis -51 degrees      Calculated T Axis 9 degrees      Diagnosis --     Normal sinus rhythm  Nonspecific ST abnormality    No previous ECGs available  Confirmed by Bandar Anderson M.D., Christal Elms ([id number]) on 5/8/2022 2:44:27 PM            Data Review:     Radiology:   XR Results (most recent):  Results from Hospital Encounter encounter on 05/08/22    XR CHEST PA LAT    Narrative  Clinical indication: Chest pain. Frontal and lateral views of the chest obtained. Comparison February 2, 2018. The a heart size is normal. There is no acute infiltrate or shift. Impression  No change no acute findings. No results for input(s): CPK, TROIQ in the last 72 hours. No lab exists for component: CKQMB, CPKMB, BMPP  Recent Labs     05/09/22  0547 05/08/22  0900    138   K 4.6 3.9    107   CO2 26 27   BUN 16 14   CREA 1.13 1.07   * 109*   CA 8.5 8.9     Recent Labs     05/08/22  0900   WBC 5.5   HGB 15.5   HCT 45.2        Recent Labs     05/08/22  0900   AP 71     Recent Labs     05/09/22  0547   CHOL 166   LDLC 102.8*     No results for input(s): CRP, TSH, TSHEXT in the last 72 hours. No lab exists for component: ESR    Ryan Marc.  KVNG Jordan    [facility] [address] [facility] [address]  [place]  [phone]      CC:None

## 2022-05-09 NOTE — PROGRESS NOTES
Medicare Outpatient Observation Notice (MOON) provided to patient/representative with verbal explanation of the notice. Time allotted for questions regarding the notice. Patient /representative provided a completed copy of the MOON notice. Copy placed on bedside chart. Merlinda Mowers. Care Management Assistant.

## 2022-05-10 ENCOUNTER — APPOINTMENT (OUTPATIENT)
Dept: NON INVASIVE DIAGNOSTICS | Age: 67
End: 2022-05-10
Attending: NURSE PRACTITIONER
Payer: MEDICARE

## 2022-05-10 VITALS
TEMPERATURE: 97.1 F | BODY MASS INDEX: 34.29 KG/M2 | RESPIRATION RATE: 16 BRPM | SYSTOLIC BLOOD PRESSURE: 161 MMHG | HEART RATE: 74 BPM | OXYGEN SATURATION: 96 % | WEIGHT: 231.48 LBS | DIASTOLIC BLOOD PRESSURE: 83 MMHG | HEIGHT: 69 IN

## 2022-05-10 LAB
ALBUMIN SERPL-MCNC: 3.3 G/DL (ref 3.5–5)
ALBUMIN/GLOB SERPL: 0.7 {RATIO} (ref 1.1–2.2)
ALP SERPL-CCNC: 63 U/L (ref 45–117)
ALT SERPL-CCNC: 32 U/L (ref 12–78)
ANION GAP SERPL CALC-SCNC: 4 MMOL/L (ref 5–15)
AST SERPL-CCNC: 19 U/L (ref 15–37)
BILIRUB SERPL-MCNC: 0.4 MG/DL (ref 0.2–1)
BUN SERPL-MCNC: 20 MG/DL (ref 6–20)
BUN/CREAT SERPL: 17 (ref 12–20)
CALCIUM SERPL-MCNC: 8.2 MG/DL (ref 8.5–10.1)
CHLORIDE SERPL-SCNC: 108 MMOL/L (ref 97–108)
CO2 SERPL-SCNC: 25 MMOL/L (ref 21–32)
CREAT SERPL-MCNC: 1.21 MG/DL (ref 0.7–1.3)
ECHO AO ROOT DIAM: 3.9 CM
ECHO AO ROOT INDEX: 1.77 CM/M2
ECHO AV PEAK GRADIENT: 8 MMHG
ECHO AV PEAK VELOCITY: 1.4 M/S
ECHO AV VELOCITY RATIO: 0.57
ECHO LA DIAMETER INDEX: 1.36 CM/M2
ECHO LA DIAMETER: 3 CM
ECHO LA TO AORTIC ROOT RATIO: 0.77
ECHO LV E' LATERAL VELOCITY: 6 CM/S
ECHO LV E' SEPTAL VELOCITY: 8 CM/S
ECHO LV FRACTIONAL SHORTENING: 24 % (ref 28–44)
ECHO LV INTERNAL DIMENSION DIASTOLE INDEX: 1.91 CM/M2
ECHO LV INTERNAL DIMENSION DIASTOLIC: 4.2 CM (ref 4.2–5.9)
ECHO LV INTERNAL DIMENSION SYSTOLIC INDEX: 1.45 CM/M2
ECHO LV INTERNAL DIMENSION SYSTOLIC: 3.2 CM
ECHO LV IVSD: 1.1 CM (ref 0.6–1)
ECHO LV MASS 2D: 212.3 G (ref 88–224)
ECHO LV MASS INDEX 2D: 96.5 G/M2 (ref 49–115)
ECHO LV POSTERIOR WALL DIASTOLIC: 1.6 CM (ref 0.6–1)
ECHO LV RELATIVE WALL THICKNESS RATIO: 0.76
ECHO LVOT PEAK GRADIENT: 3 MMHG
ECHO LVOT PEAK VELOCITY: 0.8 M/S
ECHO MV A VELOCITY: 0.78 M/S
ECHO MV AREA PHT: 2.4 CM2
ECHO MV E DECELERATION TIME (DT): 314.5 MS
ECHO MV E VELOCITY: 0.51 M/S
ECHO MV E/A RATIO: 0.65
ECHO MV E/E' LATERAL: 8.5
ECHO MV E/E' RATIO (AVERAGED): 7.44
ECHO MV E/E' SEPTAL: 6.38
ECHO MV PRESSURE HALF TIME (PHT): 91.2 MS
ECHO PV MAX VELOCITY: 1.1 M/S
ECHO PV PEAK GRADIENT: 5 MMHG
ECHO RV TAPSE: 2 CM (ref 1.7–?)
ECHO TV REGURGITANT MAX VELOCITY: 2.26 M/S
ECHO TV REGURGITANT PEAK GRADIENT: 20 MMHG
ERYTHROCYTE [DISTWIDTH] IN BLOOD BY AUTOMATED COUNT: 14.2 % (ref 11.5–14.5)
GLOBULIN SER CALC-MCNC: 4.7 G/DL (ref 2–4)
GLUCOSE BLD STRIP.AUTO-MCNC: 114 MG/DL (ref 65–117)
GLUCOSE SERPL-MCNC: 119 MG/DL (ref 65–100)
HCT VFR BLD AUTO: 46.7 % (ref 36.6–50.3)
HGB BLD-MCNC: 15.9 G/DL (ref 12.1–17)
MAGNESIUM SERPL-MCNC: 2.3 MG/DL (ref 1.6–2.4)
MCH RBC QN AUTO: 31.5 PG (ref 26–34)
MCHC RBC AUTO-ENTMCNC: 34 G/DL (ref 30–36.5)
MCV RBC AUTO: 92.5 FL (ref 80–99)
NRBC # BLD: 0 K/UL (ref 0–0.01)
NRBC BLD-RTO: 0 PER 100 WBC
PLATELET # BLD AUTO: 183 K/UL (ref 150–400)
PMV BLD AUTO: 10.9 FL (ref 8.9–12.9)
POTASSIUM SERPL-SCNC: 4.1 MMOL/L (ref 3.5–5.1)
PROT SERPL-MCNC: 8 G/DL (ref 6.4–8.2)
RBC # BLD AUTO: 5.05 M/UL (ref 4.1–5.7)
SERVICE CMNT-IMP: NORMAL
SODIUM SERPL-SCNC: 137 MMOL/L (ref 136–145)
WBC # BLD AUTO: 6 K/UL (ref 4.1–11.1)

## 2022-05-10 PROCEDURE — 36415 COLL VENOUS BLD VENIPUNCTURE: CPT

## 2022-05-10 PROCEDURE — 93306 TTE W/DOPPLER COMPLETE: CPT | Performed by: SPECIALIST

## 2022-05-10 PROCEDURE — 82962 GLUCOSE BLOOD TEST: CPT

## 2022-05-10 PROCEDURE — 74011250637 HC RX REV CODE- 250/637: Performed by: FAMILY MEDICINE

## 2022-05-10 PROCEDURE — 85027 COMPLETE CBC AUTOMATED: CPT

## 2022-05-10 PROCEDURE — 83735 ASSAY OF MAGNESIUM: CPT

## 2022-05-10 PROCEDURE — 74011000250 HC RX REV CODE- 250: Performed by: FAMILY MEDICINE

## 2022-05-10 PROCEDURE — 74011250636 HC RX REV CODE- 250/636: Performed by: HOSPITALIST

## 2022-05-10 PROCEDURE — 74011000250 HC RX REV CODE- 250: Performed by: HOSPITALIST

## 2022-05-10 PROCEDURE — 80053 COMPREHEN METABOLIC PANEL: CPT

## 2022-05-10 PROCEDURE — G0378 HOSPITAL OBSERVATION PER HR: HCPCS

## 2022-05-10 PROCEDURE — 74011250637 HC RX REV CODE- 250/637: Performed by: HOSPITALIST

## 2022-05-10 PROCEDURE — C8929 TTE W OR WO FOL WCON,DOPPLER: HCPCS

## 2022-05-10 RX ORDER — GLIMEPIRIDE 1 MG/1
1 TABLET ORAL
Qty: 90 TABLET | Refills: 1 | Status: SHIPPED | OUTPATIENT
Start: 2022-05-10 | End: 2022-05-19 | Stop reason: SDUPTHER

## 2022-05-10 RX ORDER — GUAIFENESIN 100 MG/5ML
81 LIQUID (ML) ORAL DAILY
Qty: 30 TABLET | Refills: 0 | Status: SHIPPED | OUTPATIENT
Start: 2022-05-11

## 2022-05-10 RX ORDER — DUTASTERIDE 0.5 MG/1
0.5 CAPSULE, LIQUID FILLED ORAL DAILY
Qty: 30 CAPSULE | Refills: 1 | Status: SHIPPED | OUTPATIENT
Start: 2022-05-10 | End: 2022-05-19 | Stop reason: SDUPTHER

## 2022-05-10 RX ORDER — AMLODIPINE BESYLATE 10 MG/1
10 TABLET ORAL DAILY
Qty: 30 TABLET | Refills: 1 | Status: SHIPPED | OUTPATIENT
Start: 2022-05-10 | End: 2022-05-19 | Stop reason: SDUPTHER

## 2022-05-10 RX ADMIN — DUTASTERIDE 0.5 MG: 0.5 CAPSULE, LIQUID FILLED ORAL at 09:02

## 2022-05-10 RX ADMIN — AMLODIPINE BESYLATE 10 MG: 5 TABLET ORAL at 09:02

## 2022-05-10 RX ADMIN — PERFLUTREN 1.5 ML: 6.52 INJECTION, SUSPENSION INTRAVENOUS at 09:00

## 2022-05-10 RX ADMIN — SODIUM CHLORIDE, PRESERVATIVE FREE 10 ML: 5 INJECTION INTRAVENOUS at 06:00

## 2022-05-10 RX ADMIN — ASPIRIN 81 MG 81 MG: 81 TABLET ORAL at 09:02

## 2022-05-10 NOTE — PROGRESS NOTES
[facility]                                                                                          Hospitalist Progress Note  Cj Peres MD  Answering service: [phone] from in house phone        Date of Service:  5/10/2022  NAME:  Parth Rivas  :  1955  MRN:  [id number]      Admission Summary:     Parth Rivas is a 73 y.o. male who presents with a history of 2 days of elevated blood pressure and he ran out of his home medications for diabetes and hypertension. He has an appointment to see a new primary care physician this Thursday at [facility] but came into the emergency room for further evaluation. He currently denies any chest pain shortness of breath or leg swelling-he does have some occasional headaches for which she took Excedrin-and he does have some episodes of diaphoresis which he attributes to the elevated outside temperature and exertion. At home his blood pressure systolics have been in the 170s to 190s he ran out of his medications approximately 1 month ago and his primary care physician would no longer refill his medications. He denies any pain with exertion-he only has episodes of diaphoresis when temperature is elevated or when he is exerting himself at work. No family history of heart disease but there are numerous family members with diabetes and hypertension. Patient has past labs indicate elevated LDL levels but he has never been started on a statin medication by his primary care physicians. Emergency room physician recommended admission and evaluation for elevated troponin I labs. Interval history / Subjective:     He said he feels better, no headache, left side chest pain, palpitation, exertional dyspnea or lower extremities edema.       Assessment & Plan:     Elevated troponin possible due to NSTEMI vs hypertensive cardiomyopathy   -add aspirin,   -no chest pain or exertional dyspnea  -troponin elevated but falat, last one was 128, initial 128  -EKG normal sinus vent rate 67 bpm non specific st t wave  -stress test No evidence of myocardial ischemia or infarction. Left ventricular ejection fraction is 42 %. Global hypokinesia. -lipid panel .8 , HDL 45  -echo LV EF 50-55% left ventricle size is normal, increased wall thickness, normal wall motion, dysfunction present.   -cardiologist on board, no further intervention     HTN uncontrolled   -improved and BP normal  -on norvasc, monitor BP    Sinus Bradycardia   -on monitor his HR as low as 43, PVS  -not on BB or CCB  -no dizziness  -cardiologist is consulted    T2DM    -A1c 6.4  -continue sliding scale and monitor finger stick glucose    Hx of hyperlipidemia   -lipid panel .8    Hx of OA  -stable  -on prn tylenol    Hx of BPH   -stable  -add home avodart      CM consulted for new PCP           Code status: Full Code  Prophylaxis: Lovenox  Care Plan discussed with: Patient, his wife at bedside, Nurse and CM  Anticipated Disposition: Home with [facility] Problems  Date Reviewed: 12/1/2021          Codes Class Noted POA    HTN (hypertension) ICD-10-CM: I10  ICD-9-CM: 401.9  5/8/2022 Unknown        Elevated troponin I level ICD-10-CM: R77.8  ICD-9-CM: 790.6  5/8/2022 Unknown              Vital Signs:    Last 24hrs VS reviewed since prior progress note. Most recent are:  Visit Vitals  BP (!) 161/83   Pulse 80   Temp 97.1 °F (36.2 °C)   Resp 16   Ht 5' 9\" (1.753 m)   Wt 105 kg (231 lb 7.7 oz)   SpO2 96%   BMI 34.18 kg/m²         Intake/Output Summary (Last 24 hours) at 5/10/2022 1139  Last data filed at 5/10/2022 0900  Gross per 24 hour   Intake 240 ml   Output    Net 240 ml        Physical Examination:     I had a face to face encounter with this patient and independently examined them on 5/10/2022 as outlined below:          Constitutional:  No acute distress, cooperative, pleasant    ENT:  Oral mucosa moist, oropharynx benign.     Resp:  CTA bilaterally. No wheezing/rhonchi/rales. No accessory muscle use. CV:  Regular rhythm, normal rate, no murmurs, gallops, rubs    GI:  Soft, non distended, non tender. normoactive bowel sounds, no hepatosplenomegaly     Musculoskeletal:  No edema,     Neurologic:  Moves all extremities. AAOx3, CN II-XII reviewed            Data Review:    Review and/or order of clinical lab test  Review and/or order of tests in the radiology section of CPT  Review and/or order of tests in the medicine section of CPT      Labs:     Recent Labs     05/10/22  0428 05/08/22  0900   WBC 6.0 5.5   HGB 15.9 15.5   HCT 46.7 45.2    172     Recent Labs     05/10/22  0428 05/09/22  0547 05/08/22  0900    136 138   K 4.1 4.6 3.9    107 107   CO2 25 26 27   BUN 20 16 14   CREA 1.21 1.13 1.07   * 112* 109*   CA 8.2* 8.5 8.9   MG 2.3 2.3  --      Recent Labs     05/10/22  0428 05/08/22  0900   ALT 32 40   AP 63 71   TBILI 0.4 0.4   TP 8.0 8.8*   ALB 3.3* 3.8   GLOB 4.7* 5.0*     No results for input(s): INR, PTP, APTT, INREXT, INREXT in the last 72 hours. No results for input(s): FE, TIBC, PSAT, FERR in the last 72 hours. No results found for: FOL, RBCF   No results for input(s): PH, PCO2, PO2 in the last 72 hours. No results for input(s): CPK, CKNDX, TROIQ in the last 72 hours.     No lab exists for component: CPKMB  Lab Results   Component Value Date/Time    Cholesterol, total 166 05/09/2022 05:47 AM    HDL Cholesterol 45 05/09/2022 05:47 AM    LDL, calculated 102.8 (H) 05/09/2022 05:47 AM    Triglyceride 91 05/09/2022 05:47 AM    CHOL/HDL Ratio 3.7 05/09/2022 05:47 AM     Lab Results   Component Value Date/Time    Glucose (POC) 114 05/10/2022 06:46 AM    Glucose (POC) 108 05/09/2022 09:37 PM    Glucose (POC) 78 05/09/2022 05:44 PM    Glucose (POC) 117 (H) 04/14/2017 08:54 AM     Lab Results   Component Value Date/Time    Color Yellow 07/16/2020 12:03 PM    Appearance Clear 07/16/2020 12:03 PM    Specific gravity 1.008 07/14/2015 07:59 PM    pH (UA) 5.0 07/16/2020 12:03 PM    Protein NEGATIVE  07/14/2015 07:59 PM    Glucose NEGATIVE  07/14/2015 07:59 PM    Ketone Negative 07/16/2020 12:03 PM    Bilirubin Negative 07/16/2020 12:03 PM    Urobilinogen 0.2 07/14/2015 07:59 PM    Nitrites Negative 07/16/2020 12:03 PM    Leukocyte Esterase Negative 07/16/2020 12:03 PM    Epithelial cells FEW 07/14/2015 07:59 PM    Bacteria NEGATIVE  07/14/2015 07:59 PM    WBC 0-4 07/14/2015 07:59 PM    RBC 0-5 07/14/2015 07:59 PM         Medications Reviewed:     Current Facility-Administered Medications   Medication Dose Route Frequency    aspirin chewable tablet 81 mg  81 mg Oral DAILY    glucose chewable tablet 16 g  4 Tablet Oral PRN    glucagon (GLUCAGEN) injection 1 mg  1 mg IntraMUSCular PRN    dextrose 10 % infusion 0-250 mL  0-250 mL IntraVENous PRN    insulin lispro (HUMALOG) injection   SubCUTAneous AC&HS    dutasteride (AVODART) capsule 0.5 mg  0.5 mg Oral DAILY    sodium chloride (NS) flush 5-40 mL  5-40 mL IntraVENous Q8H    sodium chloride (NS) flush 5-40 mL  5-40 mL IntraVENous PRN    acetaminophen (TYLENOL) tablet 650 mg  650 mg Oral Q6H PRN    Or    acetaminophen (TYLENOL) suppository 650 mg  650 mg Rectal Q6H PRN    polyethylene glycol (MIRALAX) packet 17 g  17 g Oral DAILY PRN    ondansetron (ZOFRAN) injection 4 mg  4 mg IntraVENous Q6H PRN    amLODIPine (NORVASC) tablet 10 mg  10 mg Oral DAILY    hydrALAZINE (APRESOLINE) tablet 25 mg  25 mg Oral TID PRN     ______________________________________________________________________  EXPECTED LENGTH OF STAY: - - -  ACTUAL LENGTH OF STAY:          0                 Andrea Rice MD

## 2022-05-10 NOTE — PROGRESS NOTES
Bedside and Verbal shift change report given to Dorcas Liz RN (oncoming nurse) by Libia Jade RN (offgoing nurse). Report included the following information SBAR, Kardex, ED Summary, Procedure Summary, Intake/Output, MAR, Recent Results and Cardiac Rhythm NSR.

## 2022-05-10 NOTE — PROGRESS NOTES
[facility]  Cardiology Care Note                                Patient Name: Sandra Adjutant - :1955 - COD:[id number]  Primary Cardiologist: none  Consulting Cardiologist: Joanna Arnett MD  Reason for Consult: elevated troponin     HPI: Mr. Naldo Leigh is a 73 y.o. male with PMH of HTN, DM type II, BPH, arthritis, obesity who presents with chief c/o hypertension after being off his BP meds for at least 1 month. Elevated HS troponin (mild) with flat trend and subsequent nuclear stress test yesterday with no ischemia or infarction. Echocardiogram this a.m. with low NL EF, NWMA, mildly dilated aortic root. ECHO ADULT COMPLETE 05/10/2022 5/10/2022  Interpretation Summary    Left Ventricle: Low normal left ventricular systolic function with a visually estimated EF of 50 - 55%. Left ventricle size is normal. Mildly increased wall thickness. Normal wall motion. Diastolic dysfunction present with normal LV EF.   Aorta: Mildly dilated aortic root.   Contrast used: Definity. Signed by: Isis Flowers MD on 5/10/2022  8:35 AM      Subjective: pt denies chest pain, SOB. Feels he is ready to go home. Assessment and Plan     1. Elevated HS troponin:  HS troponin mildly elevated with flat trend. No chest pain or exertional symptoms. Lexiscan nuclear stress test with no evidence of ischemia or infarction. Subsequent echo with low NL EF (50-55%, NWMA). Suspect that  mildly elevated troponin with flat trend is due to demand of uncontrolled hypertension. 2. HTN: bp elevated this a.m- due for meds. Amlodipine 10 mg daily. BP management per primary team. Consider losartan if bp remains elevated. Needs close followup with his new pcp- has appointment next week. .     3. Obesity: Body mass index is 34.18 kg/m². diet, exercise.    4. DM type II: A1C=6.4  Defer to primary team.       Cardiac testing:  NUCLEAR CARDIAC STRESS TEST 05/09/2022, 05/09/2022 5/9/2022  Interpretation Summary    ECG: Resting ECG demonstrates normal sinus rhythm.   ECG: Stress ECG was negative for ischemia.   Stress Test: A pharmacological stress test was performed using lexiscan. Blood pressure demonstrated a normal response and heart rate demonstrated a normal response to stress. The patient's heart rate recovery was normal. The patient reported no symptoms during the stress test.    Signed by: Obi Fay MD on 5/9/2022 11:59 AM, Signed by: Deisi Dominguez MD on 5/9/2022 12:45 PM       ____________________________________________________________      Patient Active Problem List   Diagnosis Code    Essential hypertension I10    Obesity (BMI 30.0-34. 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       [] Patient unable to provide secondary to condition    CONSTITUTIONAL: negative   ENT/MOUTH: negative  EYES: negative  CARDIOVASCULAR: negative except HTN  RESPIRATORY: negative  GASTROINTESTINAL: negative  GENITOURINARY: negative  MUSCULOSKELETAL: negative  SKIN: negative  NEUROLOGICAL: negative  PSYCHOLOGICAL: negative   HEME/LYMPH: negative  ENDOCRINE: negative        Past Medical History:   Diagnosis Date    Hypertension      Past Surgical History:   Procedure Laterality Date    HX ORTHOPAEDIC      Achilles Tendon/ left foot        Current Facility-Administered Medications:     aspirin chewable tablet 81 mg, 81 mg, Oral, DAILY, Andrea Day MD, 81 mg at 05/10/22 0902    glucose chewable tablet 16 g, 4 Tablet, Oral, PRN, Juan Calhoun MD    glucagon (GLUCAGEN) injection 1 mg, 1 mg, IntraMUSCular, PRN, Juan Calhoun MD    dextrose 10 % infusion 0-250 mL, 0-250 mL, IntraVENous, PRN, Rip Castro MD    insulin lispro (HUMALOG) injection, , SubCUTAneous, AC&HS, Rip Castro MD    dutasteride (AVODART) capsule 0.5 mg, 0.5 mg, Oral, DAILY, Oneda Andrea Boswell MD, 0.5 mg at 05/10/22 0902    sodium chloride (NS) flush 5-40 mL, 5-40 mL, IntraVENous, Q8H, Bhargav Carter MD, 10 mL at 05/10/22 0600    sodium chloride (NS) flush 5-40 mL, 5-40 mL, IntraVENous, PRN, Bhargav Carter MD    acetaminophen (TYLENOL) tablet 650 mg, 650 mg, Oral, Q6H PRN **OR** acetaminophen (TYLENOL) suppository 650 mg, 650 mg, Rectal, Q6H PRN, Bhargav Carter MD    polyethylene glycol (MIRALAX) packet 17 g, 17 g, Oral, DAILY PRN, Bhargav Carter MD    ondansetron [facility]) injection 4 mg, 4 mg, IntraVENous, Q6H PRN, Bhargav Carter MD    amLODIPine (NORVASC) tablet 10 mg, 10 mg, Oral, DAILY, Bhargav Carter MD, 10 mg at 05/10/22 2331    hydrALAZINE (APRESOLINE) tablet 25 mg, 25 mg, Oral, TID PRN, Bhargav Carter MD    Allergies   Allergen Reactions    Metformin Diarrhea    Thorazine [Chlorpromazine] Shortness of Breath    Prednisone Other (comments)     Hiccups        . SH: never smoker, no ETOH use. FH: Mom and sister have DM. No history of early CAD in family  FmHx: family history includes Diabetes in his brother, mother, and sister; Hypertension in his brother and sister. Social Hx :  reports that he has never smoked. He has never used smokeless tobacco. He reports that he does not drink alcohol and does not use drugs. Objective:    Physical Exam    Vitals:   Vitals:    05/10/22 0601 05/10/22 0727 05/10/22 0800 05/10/22 0848   BP:  (!) 145/85  (!) 161/83   Pulse: 61  84 80   Resp:    16   Temp:    97.1 °F (36.2 °C)   SpO2:    96%   Weight:  105 kg (231 lb 7.7 oz)     Height:  5' 9\" (1.753 m)         General:    Alert, cooperative, no distress, appears stated age.    Neck:   Supple,    Back:     Symmetric    Lungs:     Clear to auscultation bilaterally. Heart[de-identified]    Regular rate and rhythm, S1, S2 normal, no murmur, click, rub or gallop. Abdomen:     Soft, non-tender. Extremities:   Extremities normal, atraumatic, no cyanosis or edema. Vascular:   Pulses - 2+   Skin:   Skin color normal. No rashes or lesions on visible areas   Neurologic:   Alert, NIEVES       Telemetry: NSR- - review of telemetry- there are multiple telemetry alerts indicating HR in 40's but actual review of alerts demonstrate HR is in low 60's. ECG:   EKG Results     Procedure 720 Value Units Date/Time    EKG, 12 LEAD, INITIAL [[id number]] Collected: 05/08/22 1813    Order Status: Completed Updated: 05/09/22 1603     Ventricular Rate 61 BPM      Atrial Rate 61 BPM      P-R Interval 180 ms      QRS Duration 90 ms      Q-T Interval 402 ms      QTC Calculation (Bezet) 404 ms      Calculated P Axis 46 degrees      Calculated R Axis -49 degrees      Calculated T Axis -3 degrees      Diagnosis --     Normal sinus rhythm  Left anterior fascicular block  When compared with ECG of 08-MAY-2022 08:45,  No significant change was found  Confirmed by Bren Warren M.D., Catracho Gutierrez ([id number]) on 5/9/2022 4:03:17 PM      EKG, 12 LEAD, INITIAL [[id number]] Collected: 05/08/22 0845    Order Status: Completed Updated: 05/08/22 1444     Ventricular Rate 67 BPM      Atrial Rate 67 BPM      P-R Interval 164 ms      QRS Duration 88 ms      Q-T Interval 394 ms      QTC Calculation (Bezet) 416 ms      Calculated P Axis 58 degrees      Calculated R Axis -51 degrees      Calculated T Axis 9 degrees      Diagnosis --     Normal sinus rhythm  Nonspecific ST abnormality    No previous ECGs available  Confirmed by Angelika Louis M.D., Lanny Aggarwal ([id number]) on 5/8/2022 2:44:27 PM            Data Review:     Radiology:   XR Results (most recent):  Results from Hospital Encounter encounter on 05/08/22    XR CHEST PA LAT    Narrative  Clinical indication: Chest pain. Frontal and lateral views of the chest obtained.  Comparison February 2, 2018.  The a heart size is normal. There is no acute infiltrate or shift. Impression  No change no acute findings. No results for input(s): CPK, TROIQ in the last 72 hours. No lab exists for component: CKQMB, CPKMB, BMPP  Recent Labs     05/10/22  0428 05/09/22  0547    136   K 4.1 4.6    107   CO2 25 26   BUN 20 16   CREA 1.21 1.13   * 112*   CA 8.2* 8.5     Recent Labs     05/10/22  0428 05/08/22  0900   WBC 6.0 5.5   HGB 15.9 15.5   HCT 46.7 45.2    172     Recent Labs     05/10/22  0428 05/08/22  0900   AP 63 71     Recent Labs     05/09/22  0547   CHOL 166   LDLC 102.8*     No results for input(s): CRP, TSH, TSHEXT, TSHEXT in the last 72 hours. No lab exists for component: ESR    Lio Bhakta.  [facility] Jordan    [facility] [address] [place] [address], [address] [place] [address] [address]  [address] [facility]  [phone]      CC:None

## 2022-05-10 NOTE — PROGRESS NOTES
Echo with normal EF. BP management per primary team. Needs close followup with his primary care for BP management, full note to follow. 05/08/22    ECHO ADULT COMPLETE 05/10/2022 5/10/2022    Interpretation Summary    Left Ventricle: Low normal left ventricular systolic function with a visually estimated EF of 50 - 55%. Left ventricle size is normal. Mildly increased wall thickness. Normal wall motion. Diastolic dysfunction present with normal LV EF.   Aorta: Mildly dilated aortic root.   Contrast used: Definity.     Signed by: David Armenta MD on 5/10/2022  8:35 AM

## 2022-05-10 NOTE — PROGRESS NOTES
I have reviewed discharge instructions with the patient. The patient and spouse verbalized understanding. Current Discharge Medication List      START taking these medications    Details   aspirin 81 mg chewable tablet Take 1 Tablet by mouth daily. Qty: 30 Tablet, Refills: 0  Start date: 5/11/2022         CONTINUE these medications which have NOT CHANGED    Details   amLODIPine (NORVASC) 10 mg tablet TAKE 1 TABLET BY MOUTH EVERY DAY  Qty: 30 Tablet, Refills: 0    Comments: Patient needs to the patient care with another provider. Associated Diagnoses: Essential hypertension      dutasteride (AVODART) 0.5 mg capsule Take 1 Capsule by mouth daily. Qty: 30 Capsule, Refills: 4    Associated Diagnoses: Benign prostatic hyperplasia with nocturia      glimepiride (AMARYL) 1 mg tablet Take 1 Tablet by mouth Daily (before breakfast). Qty: 90 Tablet, Refills: 0    Comments: Patient is due for follow-up and blood work, not safe to do 90-day supply unless labs are checked.   Associated Diagnoses: New onset type 2 diabetes mellitus ([facility] [address]

## 2022-05-10 NOTE — DISCHARGE SUMMARY
Discharge Summary       PATIENT ID: Danielle Amor  MRN: [id number]   YOB: 1955    DATE OF ADMISSION: 5/8/2022  8:48 AM    DATE OF DISCHARGE: 5/10/2022   PRIMARY CARE PROVIDER: None     ATTENDING PHYSICIAN: Mila Palencia MD   DISCHARGING PROVIDER: Chastity Hicks MD    To contact this individual call [phone] and ask the  to page. If unavailable ask to be transferred the Adult Hospitalist Department. CONSULTATIONS: IP CONSULT TO CARDIOLOGY    PROCEDURES/SURGERIES: * No surgery found *    ADMISSION SUMMARY AND HOSPITAL COURSE:     Hipolito Figueroa a 73 y. o. male who presents with a history of 2 days of elevated blood pressure and he ran out of his home medications for diabetes and hypertension.  He has an appointment to see a new primary care physician this Thursday at [facility] but came into the emergency room for further evaluation.  He currently denies any chest pain shortness of breath or leg swelling-he does have some occasional headaches for which she took Excedrin-and he does have some episodes of diaphoresis which he attributes to the elevated outside temperature and exertion.  At home his blood pressure systolics have been in the 170s to 190s he ran out of his medications approximately 1 month ago and his primary care physician would no longer refill his medications.  He denies any pain with exertion-he only has episodes of diaphoresis when temperature is elevated or when he is exerting himself at [place], [place] family history of heart disease but there are numerous family members with diabetes and hypertension.  Patient has past labs indicate elevated LDL levels but he has never been started on a statin medication by his primary care physicians.  Emergency room physician recommended admission and evaluation for elevated troponin I labs.     Elevated troponin possible due to hypertensive cardiomyopathy   -continue aspirin,   -no chest pain or exertional dyspnea  -troponin elevated but falat, last one was 128, initial 128  -EKG normal sinus vent rate 67 bpm non specific st t wave  -stress test No evidence of myocardial ischemia or infarction. Left ventricular ejection fraction is 42 %. Global hypokinesia. -lipid panel .8 , HDL 45  -echo LV EF 50-55% left ventricle size is normal, increased wall thickness, normal wall motion, dysfunction present.   -cardiologist on board, no further intervention   HTN uncontrolled   -improved and BP normal  -on norvasc, monitor BP  Sinus Bradycardia   -stable, on monitor his HR as low as 43, PVS  -not on BB or CCB  -no dizziness  -cardiologist is on board  T2DM    -A1c 6.4  -continue sliding scale and monitor finger stick glucose  Hx of hyperlipidemia   -lipid panel .8  Hx of OA  -stable  -on prn tylenol  Hx of BPH   -stable  -add home avodart        Code status: Full Code  Prophylaxis: Lovenox      DISCHARGE DIAGNOSES / PLAN:      Elevated troponin possible due to hypertensive cardiomyopathy   -continue aspirin,   -no chest pain or exertional dyspnea  -advised to continue antihypertensive and monitor BP   HTN uncontrolled   -improved, continue orvasc, monitor BP  Sinus Bradycardia   -stable,   T2DM    -continue glimepiride  Hx of hyperlipidemia   -lipid panel .8  Hx of OA  -stable  -on prn tylenol  Hx of BPH   -continue avodart    NOTIFY YOUR PHYSICIAN FOR ANY OF THE FOLLOWING:   Fever over 101 degrees for 24 hours. Chest pain, shortness of breath, fever, chills, nausea, vomiting, diarrhea, change in mentation, falling, weakness, bleeding. Severe pain or pain not relieved by medications, as well as any other signs or symptoms that you may have questions about.     FOLLOW UP APPOINTMENTS:    Follow-up Information     Follow up With Specialties Details Why Contact Spencer [address] DEP Emergency Medicine  As needed, If symptoms worsen Mayur Hilliard [address] [facility], Greg Chacon, NP Nurse Practitioner On 5/19/2022 Hospital follow up new primary care appointment Thursday, 5/19/22 at 9:20 a.m. Please arrive 15 minutes early with photo ID, insurance card and a listing of all medications. [address] [place] [address] Jason Adams [facility]  [phone]          DIET: Diabetic Diet    ACTIVITY: Activity as tolerated    EQUIPMENT needed: None    DISCHARGE MEDICATIONS:  Discharge Medication List as of 5/10/2022  2:37 PM      START taking these medications    Details   aspirin 81 mg chewable tablet Take 1 Tablet by mouth daily. , Normal, Disp-30 Tablet, R-0         CONTINUE these medications which have NOT CHANGED    Details   amLODIPine (NORVASC) 10 mg tablet TAKE 1 TABLET BY MOUTH EVERY DAY, Normal, Disp-30 Tablet, R-0Patient needs to the patient care with another provider. dutasteride (AVODART) 0.5 mg capsule Take 1 Capsule by mouth daily. , Normal, Disp-30 Capsule, R-4      glimepiride (AMARYL) 1 mg tablet Take 1 Tablet by mouth Daily (before breakfast). , Normal, Disp-90 Tablet, R-0Patient is due for follow-up and blood work, not safe to do 90-day supply unless labs are checked.              DISPOSITION:    Home With:   OT  PT  HH  RN       Long term SNF/Inpatient Rehab   x Independent/assisted living    Hospice    Other:       PATIENT CONDITION AT DISCHARGE:     Functional status    Poor     Deconditioned    x Independent      Cognition    x Lucid     Forgetful     Dementia      Catheters/lines (plus indication)    Lehman     PICC     PEG    x None      Code status   x  Full code     DNR      PHYSICAL EXAMINATION AT DISCHARGE:  Patient Vitals for the past 24 hrs:   Temp Pulse Resp BP SpO2   05/10/22 1154  74      05/10/22 1000  81      05/10/22 0848 97.1 °F (36.2 °C) 80 16 (!) 161/83 96 %   05/10/22 0800  84      05/10/22 0727    (!) 145/85    05/10/22 0601  64      05/10/22 0418 97.6 °F (36.4 °C) 61 16 (!) 145/85 95 %   05/10/22 0355  (!) 57      05/10/22 0213  61      05/09/22 2336 98.3 °F (36.8 °C) 66 16 (!) 147/86 98 %   05/09/22 2207  63        General:          Alert, cooperative, no distress, appears stated age. HEENT:           Atraumatic, anicteric sclerae, pink conjunctivae                          No oral ulcers, mucosa moist, throat clear, dentition fair  Neck:               Supple, symmetrical  Lungs:             Clear to auscultation bilaterally. No Wheezing or Rhonchi. No rales. Chest wall:      No tenderness  No Accessory muscle use. Heart:              Regular  rhythm,  No  murmur   No edema  Abdomen:        Soft, non-tender. Not distended. Bowel sounds normal  Extremities:     No cyanosis. No clubbing,                            Skin turgor normal, Capillary refill normal  Skin:                Not pale. Not Jaundiced  No rashes   Psych:             Not anxious or agitated. Neurologic:      Alert, moves all extremities, answers questions appropriately and responds to commands     Recent Results (from the past 24 hour(s))   CBC W/O DIFF    Collection Time: 05/10/22  4:28 AM   Result Value Ref Range    WBC 6.0 4.1 - 11.1 K/uL    RBC 5.05 4. 10 - 5.70 M/uL    HGB 15.9 12.1 - 17.0 g/dL    HCT 46.7 36.6 - 50.3 %    MCV 92.5 80.0 - 99.0 FL    MCH 31.5 26.0 - 34.0 PG    MCHC 34.0 30.0 - 36.5 g/dL    RDW 14.2 11.5 - 14.5 %    PLATELET 671 974 - 556 K/uL    MPV 10.9 8.9 - 12.9 FL    NRBC 0.0 0  WBC    ABSOLUTE NRBC 0.00 0.00 - 0.27 K/uL   METABOLIC PANEL, COMPREHENSIVE    Collection Time: 05/10/22  4:28 AM   Result Value Ref Range    Sodium 137 136 - 145 mmol/L    Potassium 4.1 3.5 - 5.1 mmol/L    Chloride 108 97 - 108 mmol/L    CO2 25 21 - 32 mmol/L    Anion gap 4 (L) 5 - 15 mmol/L    Glucose 119 (H) 65 - 100 mg/dL    BUN 20 6 - 20 MG/DL    Creatinine 1.21 0.70 - 1.30 MG/DL    BUN/Creatinine ratio 17 12 - 20      GFR est AA >60 >60 ml/min/1.73m2    GFR est non-AA >60 >60 ml/min/1.73m2    Calcium 8.2 (L) 8.5 - 10.1 MG/DL    Bilirubin, total 0.4 0.2 - 1.0 MG/DL    ALT (SGPT) 32 12 - 78 U/L    AST (SGOT) 19 15 - 37 U/L    Alk.  phosphatase 63 45 - 117 U/L    Protein, total 8.0 6.4 - 8.2 g/dL    Albumin 3.3 (L) 3.5 - 5.0 g/dL    Globulin 4.7 (H) 2.0 - 4.0 g/dL    A-G Ratio 0.7 (L) 1.1 - 2.2     MAGNESIUM    Collection Time: 05/10/22  4:28 AM   Result Value Ref Range    Magnesium 2.3 1.6 - 2.4 mg/dL   GLUCOSE, POC    Collection Time: 05/10/22  6:46 AM   Result Value Ref Range    Glucose (POC) 114 65 - 117 mg/dL    Performed by Palma Boo    ECHO ADULT COMPLETE    Collection Time: 05/10/22  8:08 AM   Result Value Ref Range    IVSd 1.1 (A) 0.6 - 1.0 cm    LVIDd 4.2 4.2 - 5.9 cm    LVIDs 3.2 cm    LVPWd 1.6 (A) 0.6 - 1.0 cm    LVOT Peak Gradient 3 mmHg    LVOT Peak Velocity 0.8 m/s    LA Diameter 3.0 cm    AV Peak Gradient 8 mmHg    AV Peak Velocity 1.4 m/s    MV A Velocity 0.78 m/s    MV E Wave Deceleration Time 314.5 ms    MV E Velocity 0.51 m/s    LV E' Lateral Velocity 6 cm/s    LV E' Septal Velocity 8 cm/s    MV PHT 91.2 ms    MV Area by PHT 2.4 cm2    PV Peak Gradient 5 mmHg    PV Max Velocity 1.1 m/s    TAPSE 2.0 1.7 cm    TR Peak Gradient 20 mmHg    TR Max Velocity 2.26 m/s    Aortic Root 3.9 cm    Fractional Shortening 2D 24 28 - 44 %    LVIDd Index 1.91 cm/m2    LVIDs Index 1.45 cm/m2    LV RWT Ratio 0.76     LV Mass 2D 212.3 88 - 224 g    LV Mass 2D Index 96.5 49 - 115 g/m2    MV E/A 0.65     E/E' Ratio (Averaged) 7.44     E/E' Lateral 8.50     E/E' Septal 6.38     LA Size Index 1.36 cm/m2    LA/AO Root Ratio 0.77     Ao Root Index 1.77 cm/m2    AV Velocity Ratio 0.57      CHRONIC MEDICAL DIAGNOSES:  Problem List as of 5/10/2022 Date Reviewed: 12/1/2021          Codes Class Noted - Resolved    HTN (hypertension) ICD-10-CM: I10  ICD-9-CM: 401.9  5/8/2022 - Present        Elevated troponin I level ICD-10-CM: R77.8  ICD-9-CM: 790.6  5/8/2022 - Present        New onset type 2 diabetes mellitus (HCC) ICD-10-CM: E11.9  ICD-9-CM: 250.00 5/19/2021 - Present        Benign prostatic hyperplasia with nocturia ICD-10-CM: N40.1, R35.1  ICD-9-CM: 600.01, 788.43  5/19/2021 - Present        Essential hypertension ICD-10-CM: I10  ICD-9-CM: 401.9  1/14/2019 - Present        Obesity (BMI 30.0-34.9) ICD-10-CM: E66.9  ICD-9-CM: 278.00  1/14/2019 - Present        Arthritis of lumbar spine ICD-10-CM: M47.816  ICD-9-CM: 721.3  1/14/2019 - Present        Primary osteoarthritis of right knee ICD-10-CM: M17.11  ICD-9-CM: 715.16  1/14/2019 - Present              Greater than 45 minutes were spent with the patient on counseling and coordination of care    Signed:   Vonda Omalley MD  5/10/2022  11:44 AM

## 2022-05-10 NOTE — ANCILLARY DISCHARGE INSTRUCTIONS
[address]       5/10/2022      RE: Catarina Pace      To Whom it May Concern: This is to certify that Catarina Pace was admitted to hospital on 5/8/2022   to 5/10/2022. He may return to work on 5/16/2022 . Thank you for your assistance in this matter.     Sincerely,      Elkin Amezcua MD   Adult Hospitalist  [address] [place], [place] [address] [phone]

## 2022-05-11 NOTE — DISCHARGE INSTRUCTIONS
Discharge Instructions       PATIENT ID: Elvira Butcher  MRN: [id number]   YOB: 1955    DATE OF ADMISSION: 5/8/2022  8:48 AM    DATE OF DISCHARGE: 5/10/2022    PRIMARY CARE PROVIDER: None     ATTENDING PHYSICIAN: No att. providers found  DISCHARGING PROVIDER: Vonda Omalley MD    To contact this individual call [phone] and ask the  to page. If unavailable ask to be transferred the Adult Hospitalist Department. DISCHARGE DIAGNOSES   Elevated troponin possible due ti hypertensive cardiomyopathy   HTN uncontrolled   Sinus Bradycardia   T2DM    Hx of hyperlipidemia   Hx of OA  Hx of BPH     CONSULTATIONS: IP CONSULT TO CARDIOLOGY    PROCEDURES/SURGERIES: * No surgery found *    PENDING TEST RESULTS:   At the time of discharge the following test results are still pending: None    FOLLOW UP APPOINTMENTS:   Follow-up Information     Follow up With Specialties Details Why Contact Spencer Dickinson NP Nurse Practitioner On 5/19/2022 Hospital follow up new primary care appointment Thursday, 5/19/22 at 9:20 a.m. Please arrive 15 minutes early with photo ID, insurance card and a listing of all medications. [address] [place], [place] [address] [phone]          ADDITIONAL CARE RECOMMENDATIONS:      DIET: Diabetic Diet     ACTIVITY: Activity as tolerated    WOUND CARE: None    EQUIPMENT needed: None      DISCHARGE MEDICATIONS:   See Medication Reconciliation Form    · It is important that you take the medication exactly as they are prescribed. · Keep your medication in the bottles provided by the pharmacist and keep a list of the medication names, dosages, and times to be taken in your wallet. · Do not take other medications without consulting your doctor. NOTIFY YOUR PHYSICIAN FOR ANY OF THE FOLLOWING:   Fever over 101 degrees for 24 hours.    Chest pain, shortness of breath, fever, chills, nausea, vomiting, diarrhea, change in mentation, falling, weakness, bleeding. Severe pain or pain not relieved by medications. Or, any other signs or symptoms that you may have questions about.       DISPOSITION:    Home With:   OT  PT  HH  RN       SNF/Inpatient Rehab/LTAC   x Independent/assisted living    Hospice    Other:     CDMP Checked:   Yes xx     PROBLEM LIST Updated:  Yes ***       Signed:   Princess Philippe MD  5/10/2022  11:42 AM

## 2022-05-19 ENCOUNTER — OFFICE VISIT (OUTPATIENT)
Dept: PRIMARY CARE CLINIC | Age: 67
End: 2022-05-19
Payer: MEDICARE

## 2022-05-19 VITALS
HEIGHT: 69 IN | DIASTOLIC BLOOD PRESSURE: 73 MMHG | RESPIRATION RATE: 16 BRPM | WEIGHT: 228.8 LBS | BODY MASS INDEX: 33.89 KG/M2 | HEART RATE: 68 BPM | SYSTOLIC BLOOD PRESSURE: 149 MMHG | OXYGEN SATURATION: 99 % | TEMPERATURE: 98 F

## 2022-05-19 DIAGNOSIS — E66.09 CLASS 1 OBESITY DUE TO EXCESS CALORIES WITH SERIOUS COMORBIDITY AND BODY MASS INDEX (BMI) OF 33.0 TO 33.9 IN ADULT: ICD-10-CM

## 2022-05-19 DIAGNOSIS — N40.0 BENIGN PROSTATIC HYPERPLASIA WITHOUT LOWER URINARY TRACT SYMPTOMS: ICD-10-CM

## 2022-05-19 DIAGNOSIS — E11.65 TYPE 2 DIABETES MELLITUS WITH HYPERGLYCEMIA, WITHOUT LONG-TERM CURRENT USE OF INSULIN (HCC): ICD-10-CM

## 2022-05-19 DIAGNOSIS — I10 PRIMARY HYPERTENSION: ICD-10-CM

## 2022-05-19 DIAGNOSIS — I43 CARDIOMYOPATHY DUE TO HYPERTENSION, WITHOUT HEART FAILURE (HCC): Primary | ICD-10-CM

## 2022-05-19 DIAGNOSIS — I11.9 CARDIOMYOPATHY DUE TO HYPERTENSION, WITHOUT HEART FAILURE (HCC): Primary | ICD-10-CM

## 2022-05-19 DIAGNOSIS — Z11.59 ENCOUNTER FOR HEPATITIS C SCREENING TEST FOR LOW RISK PATIENT: ICD-10-CM

## 2022-05-19 DIAGNOSIS — E78.00 ELEVATED LDL CHOLESTEROL LEVEL: ICD-10-CM

## 2022-05-19 DIAGNOSIS — Z09 HOSPITAL DISCHARGE FOLLOW-UP: ICD-10-CM

## 2022-05-19 DIAGNOSIS — Z76.89 ENCOUNTER TO ESTABLISH CARE: ICD-10-CM

## 2022-05-19 LAB — GLUCOSE POC: 102 MG/DL

## 2022-05-19 PROCEDURE — 3044F HG A1C LEVEL LT 7.0%: CPT | Performed by: NURSE PRACTITIONER

## 2022-05-19 PROCEDURE — 82962 GLUCOSE BLOOD TEST: CPT | Performed by: NURSE PRACTITIONER

## 2022-05-19 PROCEDURE — 99214 OFFICE O/P EST MOD 30 MIN: CPT | Performed by: NURSE PRACTITIONER

## 2022-05-19 RX ORDER — METFORMIN HYDROCHLORIDE 500 MG/1
500 TABLET, EXTENDED RELEASE ORAL
Qty: 90 TABLET | Refills: 0 | Status: SHIPPED | OUTPATIENT
Start: 2022-05-19 | End: 2022-05-19 | Stop reason: SINTOL

## 2022-05-19 RX ORDER — IBUPROFEN 200 MG
CAPSULE ORAL
Qty: 200 STRIP | Refills: 5 | Status: SHIPPED | OUTPATIENT
Start: 2022-05-19

## 2022-05-19 RX ORDER — LANCETS
EACH MISCELLANEOUS
Qty: 1 EACH | Refills: 11 | Status: SHIPPED | OUTPATIENT
Start: 2022-05-19

## 2022-05-19 RX ORDER — DUTASTERIDE 0.5 MG/1
0.5 CAPSULE, LIQUID FILLED ORAL DAILY
Qty: 90 CAPSULE | Refills: 1 | Status: SHIPPED | OUTPATIENT
Start: 2022-05-19 | End: 2022-06-27 | Stop reason: SDUPTHER

## 2022-05-19 RX ORDER — GLIMEPIRIDE 1 MG/1
1 TABLET ORAL
Qty: 90 TABLET | Refills: 0 | Status: SHIPPED | OUTPATIENT
Start: 2022-05-19 | End: 2022-05-19

## 2022-05-19 RX ORDER — GLIMEPIRIDE 2 MG/1
2 TABLET ORAL
Qty: 90 TABLET | Refills: 1 | Status: SHIPPED | OUTPATIENT
Start: 2022-05-19 | End: 2022-06-27

## 2022-05-19 RX ORDER — AMLODIPINE BESYLATE 10 MG/1
10 TABLET ORAL DAILY
Qty: 30 TABLET | Refills: 1 | Status: SHIPPED | OUTPATIENT
Start: 2022-05-19 | End: 2022-06-27 | Stop reason: SDUPTHER

## 2022-05-19 RX ORDER — INSULIN PUMP SYRINGE, 3 ML
EACH MISCELLANEOUS
Qty: 1 KIT | Refills: 1 | Status: SHIPPED | OUTPATIENT
Start: 2022-05-19

## 2022-05-19 RX ORDER — HYDROCHLOROTHIAZIDE 12.5 MG/1
12.5 TABLET ORAL DAILY
Qty: 30 TABLET | Refills: 1 | Status: SHIPPED | OUTPATIENT
Start: 2022-05-19 | End: 2022-06-27 | Stop reason: SDUPTHER

## 2022-05-19 NOTE — PROGRESS NOTES
[facility]   [address], [address] [facility]  P: [phone]  F: [phone]    SUBJECTIVE     HPI:     Maria Del Rosario Navarro is a 73 y.o. male who is seen in the clinic for   Chief Complaint   Patient presents with   [facility]      The patient was admitted to [facility] from 05/08-05/10 for Uncontrolled HTN, suspected Hypertensive Cardiomyopathy, and Uncontrolled DM II. Per patient, he had been out of the medications for several months d/t his previous pcp \"not refilling them d/t missed appointments\". While in the Hospital, a Echo was performed, showing an EF 50-55%. A Stress Test was also performed, which showed Global Hypokinesia. His HA1C was 6.4. Does not have a glucometer at home. Would like one. Glucose reading today is 102. His Lipid panel showed a mildly elevated LDL. Since being placed back on his medications, his BP readings have been in the 140-150/80s. He denies any s/s of Hypertensive Crisis. Takes Amlodipine 10 mg once daily. Does not limit his NA intake. However, he does exercise regularly. Has not established care w/ a Cardiologist.     Of note, the patient does have a dx of BPH. He takes Dutasteride  0.5 mg once daily, which helps manage his symptoms well. Patient Active Problem List    Diagnosis    HTN (hypertension)    Elevated troponin I level    New onset type 2 diabetes mellitus ([address]    Benign prostatic hyperplasia with nocturia    Essential hypertension    Obesity (BMI 30.0-34. 9)    Arthritis of lumbar spine    Primary osteoarthritis of right knee        Past Medical History:   Diagnosis Date    Hypertension      Past Surgical History:   Procedure Laterality Date    HX ORTHOPAEDIC      Achilles Tendon/ left foot      Social History     Socioeconomic History    Marital status:      Spouse name: Not on file    Number of children: Not on file    Years of education: Not on file    Highest education level: Not on file   Occupational History    Not on file   Tobacco Use    Smoking status: Never Smoker    Smokeless tobacco: Never Used   Vaping Use    Vaping Use: Never used   Substance and Sexual Activity    Alcohol use: No    Drug use: No    Sexual activity: Yes     Partners: Female   Other Topics Concern    Not on file   Social History Narrative    Not on file     Social Determinants of Health     Financial Resource Strain:     Difficulty of Paying Living Expenses: Not on file   Food Insecurity:     Worried About Running Out of Food in the Last Year: Not on file    Tramaine of Food in the Last Year: Not on file   Transportation Needs:     Lack of Transportation (Medical): Not on file    Lack of Transportation (Non-Medical):  Not on file   Physical Activity:     Days of Exercise per Week: Not on file    Minutes of Exercise per Session: Not on file   Stress:     Feeling of Stress : Not on file   Social Connections:     Frequency of Communication with Friends and Family: Not on file    Frequency of Social Gatherings with Friends and Family: Not on file    Attends Orthodoxy Services: Not on file    Active Member of [address] [place] [address] [employer] or Organizations: Not on file    Attends Club or Organization Meetings: Not on file    Marital Status: Not on file   Intimate Partner Violence:     Fear of Current or Ex-Partner: Not on file    Emotionally Abused: Not on file    Physically Abused: Not on file    Sexually Abused: Not on file   Housing Stability:     Unable to Pay for Housing in the Last Year: Not on file    Number of [place] in the Last Year: Not on file    Unstable Housing in the Last Year: Not on file     Family History   Problem Relation Age of Onset    Diabetes Mother     Diabetes Sister     Hypertension Sister     Diabetes Brother     Hypertension Brother      Immunization History   Administered Date(s) Administered    COVID-19, Pfizer Purple top, DILUTE for use, 12+ yrs, 30mcg/0.3mL dose 04/14/2021, 05/05/2021    Influenza Vaccine (Quad) PF (>6 Mo Flulaval, Fluarix, and >3 Yrs Afluria, Fluzone 37424) 02/06/2018    Pneumococcal Polysaccharide (PPSV-23) 12/01/2021      Allergies   Allergen Reactions    Metformin Diarrhea    Thorazine [Chlorpromazine] Shortness of Breath    Prednisone Other (comments)     Hiccups         Admission on 05/08/2022, Discharged on 05/10/2022   Component Date Value Ref Range Status    WBC 05/08/2022 5.5  4.1 - 11.1 K/uL Final    RBC 05/08/2022 4.94  4.10 - 5.70 M/uL Final    HGB 05/08/2022 15.5  12.1 - 17.0 g/dL Final    HCT 05/08/2022 45.2  36.6 - 50.3 % Final    MCV 05/08/2022 91.5  80.0 - 99.0 FL Final    MCH 05/08/2022 31.4  26.0 - 34.0 PG Final    MCHC 05/08/2022 34.3  30.0 - 36.5 g/dL Final    RDW 05/08/2022 14.1  11.5 - 14.5 % Final    PLATELET 28/80/7208 115  150 - 400 K/uL Final    MPV 05/08/2022 10.9  8.9 - 12.9 FL Final    NRBC 05/08/2022 0.0  0  WBC Final    ABSOLUTE NRBC 05/08/2022 0.00  0.00 - 0.01 K/uL Final    NEUTROPHILS 05/08/2022 38  32 - 75 % Final    LYMPHOCYTES 05/08/2022 48  12 - 49 % Final    MONOCYTES 05/08/2022 6  5 - 13 % Final    EOSINOPHILS 05/08/2022 7  0 - 7 % Final    BASOPHILS 05/08/2022 1  0 - 1 % Final    IMMATURE GRANULOCYTES 05/08/2022 0  0.0 - 0.5 % Final    ABS. NEUTROPHILS 05/08/2022 2.1  1.8 - 8.0 K/UL Final    ABS. LYMPHOCYTES 05/08/2022 2.6  0.8 - 3.5 K/UL Final    ABS. MONOCYTES 05/08/2022 0.3  0.0 - 1.0 K/UL Final    ABS. EOSINOPHILS 05/08/2022 0.4  0.0 - 0.4 K/UL Final    ABS. BASOPHILS 05/08/2022 0.0  0.0 - 0.1 K/UL Final    ABS. IMM.  GRANS. 05/08/2022 0.0  0.00 - 0.04 K/UL Final    DF 05/08/2022 AUTOMATED    Final    Sodium 05/08/2022 138  136 - 145 mmol/L Final    Potassium 05/08/2022 3.9  3.5 - 5.1 mmol/L Final    Chloride 05/08/2022 107  97 - 108 mmol/L Final    CO2 05/08/2022 27  21 - 32 mmol/L Final    Anion gap 05/08/2022 4* 5 - 15 mmol/L Final    Glucose 05/08/2022 109* 65 - 100 mg/dL Final    BUN 05/08/2022 14  6 - 20 MG/DL Final    Creatinine 05/08/2022 1.07  0.70 - 1.30 MG/DL Final    BUN/Creatinine ratio 05/08/2022 13  12 - 20   Final    GFR est AA 05/08/2022 >60  >60 ml/min/1.73m2 Final    GFR est non-AA 05/08/2022 >60  >60 ml/min/1.73m2 Final    Estimated GFR is calculated using the IDMS-traceable Modification of Diet in Renal Disease (MDRD) Study equation, reported for both  Americans (GFRAA) and non- Americans (GFRNA), and normalized to 1.73m2 body surface area. The physician must decide which value applies to the patient.  Calcium 05/08/2022 8.9  8.5 - 10.1 MG/DL Final    Bilirubin, total 05/08/2022 0.4  0.2 - 1.0 MG/DL Final    ALT (SGPT) 05/08/2022 40  12 - 78 U/L Final    AST (SGOT) 05/08/2022 25  15 - 37 U/L Final    Alk. phosphatase 05/08/2022 71  45 - 117 U/L Final    Protein, total 05/08/2022 8.8* 6.4 - 8.2 g/dL Final    Albumin 05/08/2022 3.8  3.5 - 5.0 g/dL Final    Globulin 05/08/2022 5.0* 2.0 - 4.0 g/dL Final    A-G Ratio 05/08/2022 0.8* 1.1 - 2.2   Final    Troponin-High Sensitivity 05/08/2022 126* 0 - 76 ng/L Final    Comment: Up to 50% of normal patients may have low levels of detectable troponin (within reference range) circulating in the blood. Mild elevations in troponin that are above the reference range, may be present with other cardiac and non-cardiac disease states. Two or three serial tests at two hour intervals, are recommended to evaluate changes in circulating troponin over time. CALLED TO AND READ BACK BY  FANY PEREIRA AT 1008.  TJS      Ventricular Rate 05/08/2022 67  BPM Final    Atrial Rate 05/08/2022 67  BPM Final    P-R Interval 05/08/2022 164  ms Final    QRS Duration 05/08/2022 88  ms Final    Q-T Interval 05/08/2022 394  ms Final    QTC Calculation (Bezet) 05/08/2022 416  ms Final    Calculated P Axis 05/08/2022 58  degrees Final    Calculated R Axis 05/08/2022 -51  degrees Final    Calculated T Axis 05/08/2022 9  degrees Final    Diagnosis 05/08/2022    Final                    Value:Normal sinus rhythm  Nonspecific ST abnormality    No previous ECGs available  Confirmed by Venice Haywood M.D., [place] ([id number]) on 5/8/2022 2:44:27 PM      SAMPLES BEING HELD 05/08/2022 1 RED   Final    COMMENT 05/08/2022 Add-on orders for these samples will be processed based on acceptable specimen integrity and analyte stability, which may vary by analyte. Final    Troponin-High Sensitivity 05/08/2022 128* 0 - 76 ng/L Final    Comment: Up to 50% of normal patients may have low levels of detectable troponin (within reference range) circulating in the blood. Mild elevations in troponin that are above the reference range, may be present with other cardiac and non-cardiac disease states. Two or three serial tests at two hour intervals, are recommended to evaluate changes in circulating troponin over time. CALLED TO AND READ BACK BY  KI SOARES [facility] [facility]      Troponin-High Sensitivity 05/08/2022 121* 0 - 76 ng/L Final    Comment: Up to 50% of normal patients may have low levels of detectable troponin (within reference range) circulating in the blood. Mild elevations in troponin that are above the reference range, may be present with other cardiac and non-cardiac disease states. Two or three serial tests at two hour intervals, are recommended to evaluate changes in circulating troponin over time.   CALLED TO AND READ BACK BY  ANGEL SOARES @1852/VXD      Ventricular Rate 05/08/2022 61  BPM Final    Atrial Rate 05/08/2022 61  BPM Final    P-R Interval 05/08/2022 180  ms Final    QRS Duration 05/08/2022 90  ms Final    Q-T Interval 05/08/2022 402  ms Final    QTC Calculation (Bezet) 05/08/2022 404  ms Final    Calculated P Axis 05/08/2022 46  degrees Final    Calculated R Axis 05/08/2022 -49  degrees Final    Calculated T Axis 05/08/2022 -3  degrees Final    Diagnosis 05/08/2022    Final                    Value:Normal sinus rhythm  Left anterior fascicular block  When compared with ECG of 08-MAY-2022 08:45,  No significant change was found  Confirmed by Reymundo Casper M.D., Richardson Foster ([id number]) on 5/9/2022 4:03:17 PM      Cholesterol, total 05/09/2022 166  <200 MG/DL Final    Triglyceride 05/09/2022 91  <150 MG/DL Final    Based on NCEP-ATP III:  Triglycerides <150 mg/dL  is considered normal, 150-199 mg/dL  borderline high,  200-499 mg/dL high and  greater than or equal to 500 mg/dL very high.  HDL Cholesterol 05/09/2022 45  MG/DL Final    Based on NCEP ATP III, HDL Cholesterol <40 mg/dL is considered low and >60 mg/dL is elevated.  LDL, calculated 05/09/2022 102.8* 0 - 100 MG/DL Final    Comment: Based on the NCEP-ATP: LDL-C concentrations are considered  optimal <100 mg/dL,  near optimal/above Normal 100-129 mg/dL  Borderline High: 130-159, High: 160-189 mg/dL  Very High: Greater than or equal to 190 mg/dL      VLDL, calculated 05/09/2022 18.2  MG/DL Final    CHOL/HDL Ratio 05/09/2022 3.7  0.0 - 5.0   Final    Magnesium 05/09/2022 2.3  1.6 - 2.4 mg/dL Final    SPECIMEN HEMOLYZED, RESULTS MAY BE AFFECTED    Sodium 05/09/2022 136  136 - 145 mmol/L Final    Potassium 05/09/2022 4.6  3.5 - 5.1 mmol/L Final    SPECIMEN HEMOLYZED, RESULTS MAY BE AFFECTED    Chloride 05/09/2022 107  97 - 108 mmol/L Final    CO2 05/09/2022 26  21 - 32 mmol/L Final    Anion gap 05/09/2022 3* 5 - 15 mmol/L Final    Glucose 05/09/2022 112* 65 - 100 mg/dL Final    BUN 05/09/2022 16  6 - 20 MG/DL Final    Creatinine 05/09/2022 1.13  0.70 - 1.30 MG/DL Final    BUN/Creatinine ratio 05/09/2022 14  12 - 20   Final    GFR est AA 05/09/2022 >60  >60 ml/min/1.73m2 Final    GFR est non-AA 05/09/2022 >60  >60 ml/min/1.73m2 Final    Calcium 05/09/2022 8.5  8.5 - 10.1 MG/DL Final    Troponin-High Sensitivity 05/09/2022 128* 0 - 76 ng/L Final    Comment: Up to 50% of normal patients may have low levels of detectable troponin (within reference range) circulating in the blood.  Mild elevations in troponin that are above the reference range, may be present with other cardiac and non-cardiac disease states. Two or three serial tests at two hour intervals, are recommended to evaluate changes in circulating troponin over time.   CALLED TO AND READ BACK BY  THEE SOARES [id number] [facility]      Hemoglobin A1c 05/09/2022 6.4* 4.0 - 5.6 % Final    Comment: NEW METHOD  PLEASE NOTE NEW REFERENCE RANGE  (NOTE)  HbA1C Interpretive Ranges  <5.7              Normal  5.7 - 6.4         Consider Prediabetes  >6.5              Consider Diabetes      Est. average glucose 05/09/2022 137  mg/dL Final    Stress Target HR 05/09/2022 155  bpm Final    Baseline Systolic BP 90/14/1009 395  mmHg Final    Baseline Diastolic BP 71/91/3340 88  mmHg Final    Stress Stage 1 Duration 05/09/2022 1  min:sec Final    Stress Stage 1 HR 05/09/2022 73  bpm Final    Stress Stage 2 Duration 05/09/2022 1  min:sec Final    Stress Stage 2 HR 05/09/2022 96  bpm Final    Stress Stage 2 BP 05/09/2022 166/81  mmHg Final    Stress Stage 3 Duration 05/09/2022 1  min:sec Final    Stress Stage 3 HR 05/09/2022 87  bpm Final    Stress Stage 4 Duration 05/09/2022 1  min:sec Final    Recovery Stage 1 Duration 05/09/2022 1  min:sec Final    Recovery Stage 1 HR 05/09/2022 83  bpm Final    Recovery Stage 1 BP 05/09/2022 190/83  mmHg Final    Recovery Stage 2 Duration 05/09/2022 1  min:sec Final    Recovery Stage 2 HR 05/09/2022 82  bpm Final    Recovery Stage 3 Duration 05/09/2022 1  min:sec Final    Recovery Stage 3 HR 05/09/2022 78  bpm Final    Recovery Stage 3 BP 05/09/2022 170/86  mmHg Final    Exercise Duration Time 05/09/2022 3  min Final    Exercuse Duration Seconds 05/09/2022 0  sec Final    Stress Systolic BP 99/12/8770 147  mmHg Final    Stress Diastolic BP 02/74/8478 83  mmHg Final    Stress Peak HR 05/09/2022 96  BPM Final    Baseline HR 05/09/2022 64  BPM Final    Stress Estimated Workload 05/09/2022 1.0  METS Final    Stress Rate Pressure Product 05/09/2022 18,240  BPM*mmHg Final    Stress Percent HR Achieved 05/09/2022 62  % Final    Baseline ST Depression 05/09/2022 0  mm Final    IVSd 05/10/2022 1.1* 0.6 - 1.0 cm Final    LVIDd 05/10/2022 4.2  4.2 - 5.9 cm Final    LVIDs 05/10/2022 3.2  cm Final    LVPWd 05/10/2022 1.6* 0.6 - 1.0 cm Final    LVOT Peak Gradient 05/10/2022 3  mmHg Final    LVOT Peak Velocity 05/10/2022 0.8  m/s Final    LA Diameter 05/10/2022 3.0  cm Final    AV Peak Gradient 05/10/2022 8  mmHg Final    AV Peak Velocity 05/10/2022 1.4  m/s Final    MV A Velocity 05/10/2022 0.78  m/s Final    MV E Wave Deceleration Time 05/10/2022 314.5  ms Final    MV E Velocity 05/10/2022 0.51  m/s Final    LV E' Lateral Velocity 05/10/2022 6  cm/s Final    LV E' Septal Velocity 05/10/2022 8  cm/s Final    MV PHT 05/10/2022 91.2  ms Final    MV Area by PHT 05/10/2022 2.4  cm2 Final    PV Peak Gradient 05/10/2022 5  mmHg Final    PV Max Velocity 05/10/2022 1.1  m/s Final    TAPSE 05/10/2022 2.0  1.7 cm Final    TR Peak Gradient 05/10/2022 20  mmHg Final    TR Max Velocity 05/10/2022 2.26  m/s Final    Aortic Root 05/10/2022 3.9  cm Final    Fractional Shortening 2D 05/10/2022 24  28 - 44 % Final    LVIDd Index 05/10/2022 1.91  cm/m2 Final    LVIDs Index 05/10/2022 1.45  cm/m2 Final    LV RWT Ratio 05/10/2022 0.76   Final    LV Mass 2D 05/10/2022 212.3  88 - 224 g Final    LV Mass 2D Index 05/10/2022 96.5  49 - 115 g/m2 Final    MV E/A 05/10/2022 0.65   Final    E/E' Ratio (Averaged) 05/10/2022 7.44   Final    E/E' Lateral 05/10/2022 8.50   Final    E/E' Septal 05/10/2022 6.38   Final    LA Size Index 05/10/2022 1.36  cm/m2 Final    LA/AO Root Ratio 05/10/2022 0.77   Final    Ao Root Index 05/10/2022 1.77  cm/m2 Final    AV Velocity Ratio 05/10/2022 0.57   Final    Glucose (POC) 05/09/2022 78  65 - 117 mg/dL Final    Comment: (NOTE)  The FDA has indicated that no capillary point of care blood glucose  monitoring systems are approved for use in \"critically ill\" patients,  however they have not defined this population. The College of  American Pathologists has recommended that these devices should not  be used in cases such as severe hypotension, dehydration, shock, and  hyperglycemic-hyperosmolar state, amongst others. Venous or arterial  collection is the recommended specimen for testing these patients.  Performed by 05/09/2022 Augusto Paige   Final    Glucose (POC) 05/09/2022 108  65 - 117 mg/dL Final    Comment: (NOTE)  The FDA has indicated that no capillary point of care blood glucose  monitoring systems are approved for use in \"critically ill\" patients,  however they have not defined this population. The College of  American Pathologists has recommended that these devices should not  be used in cases such as severe hypotension, dehydration, shock, and  hyperglycemic-hyperosmolar state, amongst others. Venous or arterial  collection is the recommended specimen for testing these patients.  Performed by 05/09/2022 Juan José Ruiz   Final    WBC 05/10/2022 6.0  4.1 - 11.1 K/uL Final    RBC 05/10/2022 5.05  4. 10 - 5.70 M/uL Final    HGB 05/10/2022 15.9  12.1 - 17.0 g/dL Final    HCT 05/10/2022 46.7  36.6 - 50.3 % Final    MCV 05/10/2022 92.5  80.0 - 99.0 FL Final    MCH 05/10/2022 31.5  26.0 - 34.0 PG Final    MCHC 05/10/2022 34.0  30.0 - 36.5 g/dL Final    RDW 05/10/2022 14.2  11.5 - 14.5 % Final    PLATELET 95/28/9946 254  150 - 400 K/uL Final    MPV 05/10/2022 10.9  8.9 - 12.9 FL Final    NRBC 05/10/2022 0.0  0  WBC Final    ABSOLUTE NRBC 05/10/2022 0.00  0.00 - 0.01 K/uL Final    Sodium 05/10/2022 137  136 - 145 mmol/L Final    Potassium 05/10/2022 4.1  3.5 - 5.1 mmol/L Final    Chloride 05/10/2022 108  97 - 108 mmol/L Final    CO2 05/10/2022 25  21 - 32 mmol/L Final    Anion gap 05/10/2022 4* 5 - 15 mmol/L Final    Glucose 05/10/2022 119* 65 - 100 mg/dL Final  BUN 05/10/2022 20  6 - 20 MG/DL Final    Creatinine 05/10/2022 1.21  0.70 - 1.30 MG/DL Final    BUN/Creatinine ratio 05/10/2022 17  12 - 20   Final    GFR est AA 05/10/2022 >60  >60 ml/min/1.73m2 Final    GFR est non-AA 05/10/2022 >60  >60 ml/min/1.73m2 Final    Calcium 05/10/2022 8.2* 8.5 - 10.1 MG/DL Final    Bilirubin, total 05/10/2022 0.4  0.2 - 1.0 MG/DL Final    ALT (SGPT) 05/10/2022 32  12 - 78 U/L Final    AST (SGOT) 05/10/2022 19  15 - 37 U/L Final    Alk. phosphatase 05/10/2022 63  45 - 117 U/L Final    Protein, total 05/10/2022 8.0  6.4 - 8.2 g/dL Final    Albumin 05/10/2022 3.3* 3.5 - 5.0 g/dL Final    Globulin 05/10/2022 4.7* 2.0 - 4.0 g/dL Final    A-G Ratio 05/10/2022 0.7* 1.1 - 2.2   Final    Magnesium 05/10/2022 2.3  1.6 - 2.4 mg/dL Final    Glucose (POC) 05/10/2022 114  65 - 117 mg/dL Final    Comment: (NOTE)  The FDA has indicated that no capillary point of care blood glucose  monitoring systems are approved for use in \"critically ill\" patients,  however they have not defined this population. The College of  American Pathologists has recommended that these devices should not  be used in cases such as severe hypotension, dehydration, shock, and  hyperglycemic-hyperosmolar state, amongst others. Venous or arterial  collection is the recommended specimen for testing these patients.  Performed by 05/10/2022 Perfecto Plaza   Final      ECHO ADULT COMPLETE    Left Ventricle: Low normal left ventricular systolic function with a   visually estimated EF of 50 - 55%. Left ventricle size is normal. Mildly   increased wall thickness. Normal wall motion. Diastolic dysfunction   present with normal LV EF.   Aorta: Mildly dilated aortic root.   Contrast used: Definity. Current Outpatient Medications   Medication Sig Dispense Refill    hydroCHLOROthiazide (HYDRODIURIL) 12.5 mg tablet Take 1 Tablet by mouth daily.  30 Tablet 1    amLODIPine (NORVASC) 10 mg tablet Take 1 Tablet by mouth daily. 30 Tablet 1    dutasteride (AVODART) 0.5 mg capsule Take 1 Capsule by mouth daily. 90 Capsule 1    Blood-Glucose Meter monitoring kit Test once daily before breakfast 1 Kit 1    glucose blood VI test strips (blood glucose test) strip Test as directed in clinic. Dx: Diabetes mellitus Type 2 200 Strip 5    lancets misc Please use one lancet with every blood sugar reading 1 Each 11    glimepiride (AMARYL) 2 mg tablet Take 1 Tablet by mouth every morning. 90 Tablet 1    aspirin 81 mg chewable tablet Take 1 Tablet by mouth daily. 30 Tablet 0           The past medical history, past surgical history, and family history were reviewed and updated in the medical record. Lab values/Imaging were reviewed. The medications were reviewed and updated in the medical record. Immunizations were reviewed and updated in the medical record. All relevant preventative screenings reviewed and updated in the medical record. REVIEW OF SYSTEMS   Review of Systems   Constitutional: Negative for chills, diaphoresis, fever, malaise/fatigue and weight loss. Respiratory: Negative for cough, sputum production, shortness of breath and wheezing. Cardiovascular: Negative for chest pain, palpitations, orthopnea, leg swelling and PND. Genitourinary: Negative for dysuria, frequency and urgency. Neurological: Negative for dizziness, tingling, sensory change, speech change, focal weakness, weakness and headaches. PHYSICAL EXAM   BP (!) 149/73 (BP 1 Location: Right arm, BP Patient Position: Sitting, BP Cuff Size: Adult long)   Pulse 68   Temp 98 °F (36.7 °C) (Temporal)   Resp 16   Ht 5' 9\" (1.753 m)   Wt 228 lb 12.8 oz (103.8 kg)   SpO2 99%   BMI 33.79 kg/m²      Physical Exam  Constitutional:       General: He is not in acute distress. Appearance: Normal appearance. He is obese. He is not ill-appearing or diaphoretic. Eyes:      Pupils: Pupils are equal, round, and reactive to light. Cardiovascular:      Rate and Rhythm: Normal rate and regular rhythm. Pulses: Normal pulses. Heart sounds: Normal heart sounds. No murmur heard. Pulmonary:      Effort: Pulmonary effort is normal. No respiratory distress. Breath sounds: Normal breath sounds. No wheezing. Neurological:      General: No focal deficit present. Mental Status: He is alert. Cranial Nerves: No cranial nerve deficit. Motor: No weakness. ASSESSMENT/ PLAN   Below is the assessment and plan developed based on review of pertinent history, physical exam, labs, studies, and medications. 1. Cardiomyopathy due to hypertension, without heart failure (HCC)  -     hydroCHLOROthiazide (HYDRODIURIL) 12.5 mg tablet; Take 1 Tablet by mouth daily. , Normal, Disp-30 Tablet, R-1  -     REFERRAL TO CARDIOLOGY  2. Primary hypertension  -     hydroCHLOROthiazide (HYDRODIURIL) 12.5 mg tablet; Take 1 Tablet by mouth daily. , Normal, Disp-30 Tablet, R-1  -     amLODIPine (NORVASC) 10 mg tablet; Take 1 Tablet by mouth daily. , Normal, Disp-30 Tablet, R-1Patient needs to the patient care with another provider.  -     [facility]; Future  3. Type 2 diabetes mellitus with hyperglycemia, without long-term current use of insulin (HCC)  -     REFERRAL TO DIABETIC EDUCATION  -     Blood-Glucose Meter monitoring kit; Test once daily before breakfast, Normal, Disp-1 Kit, R-1  -     glucose blood VI test strips (blood glucose test) strip; Test as directed in clinic. Dx: Diabetes mellitus Type 2, Normal, Disp-200 Strip, R-5  -     lancets misc; Please use one lancet with every blood sugar reading, Normal, Disp-1 Each, R-11  -     glimepiride (AMARYL) 2 mg tablet; Take 1 Tablet by mouth every morning., Normal, Disp-90 Tablet, R-1  -     AMB POC GLUCOSE BLOOD, BY GLUCOSE MONITORING DEVICE  4.  Benign prostatic hyperplasia without lower urinary tract symptoms  -     dutasteride (AVODART) 0.5 mg capsule; Take 1 Capsule by mouth daily. , Normal, Disp-90 Capsule, R-1  5. Elevated LDL cholesterol level  -     LIPID PANEL; Future  6. Class 1 obesity due to excess calories with serious comorbidity and body mass index (BMI) of 33.0 to 33.9 in adult  7. Hospital discharge follow-up  8. Encounter for hepatitis C screening test for low risk patient  -     HEPATITIS C AB; Future  9. Routine lab draw  10. Encounter to establish care         Follow-up and Dispositions    · Return in about 4 weeks (around 6/16/2022) for Re-assess BP w/ HCTZ and Re-assess Glucose w/ increase in Amaryl . Disclaimer:  Advised patient to call back or return to office if symptoms worsen/change/persist.  Discussed expected course/resolution/complications of diagnosis in detail with patient. Medication risks/benefits/alternatives discussed with patient. Patient was given an after visit summary which includes diagnoses, current medications, & vitals. Discussed patient instructions and advised to read to all patient instructions regarding care. Patient expressed understanding with the diagnosis and plan.        Parth Carmona NP  5/19/2022

## 2022-05-19 NOTE — PROGRESS NOTES
Chief Complaint   Patient presents with   [facility] Follow Up   [address] Maintenance Due   Topic    DTaP/Tdap/Td series (1 - Tdap)    Colorectal Cancer Screening Combo     Medicare Yearly Exam     Foot Exam Q1     MICROALBUMIN Q1         1. Have you been to the ER, urgent care clinic since your last visit? Hospitalized since your last visit? Yes 05/08/21- [facility]    2. Have you seen or consulted any other health care providers outside of the [address] [place] [address] since your last visit? Include any pap smears or colon screening.  No    Visit Vitals  BP (!) 144/74 (BP 1 Location: Right arm, BP Patient Position: Sitting, BP Cuff Size: Adult long)   Pulse 68   Temp 98 °F (36.7 °C) (Temporal)   Resp 16   Ht 5' 9\" (1.753 m)   Wt 228 lb 12.8 oz (103.8 kg)   SpO2 99%   BMI 33.79 kg/m²

## 2022-05-20 ENCOUNTER — TELEPHONE (OUTPATIENT)
Dept: PRIMARY CARE CLINIC | Age: 67
End: 2022-05-20

## 2022-05-20 NOTE — TELEPHONE ENCOUNTER
----- Message from James Gibson NP sent at 5/19/2022  3:05 PM EDT -----  Your LDL has risen slightly. The goal is it to be below 70. Please begin taking over the counter fish oil, if you prefer. Limit your red meat and shellfish intake. Look into a Mediterranean diet.

## 2022-06-27 ENCOUNTER — OFFICE VISIT (OUTPATIENT)
Dept: PRIMARY CARE CLINIC | Age: 67
End: 2022-06-27
Payer: MEDICARE

## 2022-06-27 VITALS
DIASTOLIC BLOOD PRESSURE: 96 MMHG | BODY MASS INDEX: 33.38 KG/M2 | SYSTOLIC BLOOD PRESSURE: 148 MMHG | HEART RATE: 60 BPM | WEIGHT: 225.4 LBS | RESPIRATION RATE: 16 BRPM | HEIGHT: 69 IN | TEMPERATURE: 97.3 F | OXYGEN SATURATION: 98 %

## 2022-06-27 DIAGNOSIS — E78.00 ELEVATED LDL CHOLESTEROL LEVEL: ICD-10-CM

## 2022-06-27 DIAGNOSIS — I43 CARDIOMYOPATHY DUE TO HYPERTENSION, WITHOUT HEART FAILURE (HCC): ICD-10-CM

## 2022-06-27 DIAGNOSIS — I10 PRIMARY HYPERTENSION: Primary | ICD-10-CM

## 2022-06-27 DIAGNOSIS — N40.0 BENIGN PROSTATIC HYPERPLASIA WITHOUT LOWER URINARY TRACT SYMPTOMS: ICD-10-CM

## 2022-06-27 DIAGNOSIS — I11.9 CARDIOMYOPATHY DUE TO HYPERTENSION, WITHOUT HEART FAILURE (HCC): ICD-10-CM

## 2022-06-27 DIAGNOSIS — E11.9 TYPE 2 DIABETES MELLITUS WITHOUT COMPLICATION, WITHOUT LONG-TERM CURRENT USE OF INSULIN (HCC): ICD-10-CM

## 2022-06-27 DIAGNOSIS — E66.09 CLASS 1 OBESITY DUE TO EXCESS CALORIES WITH SERIOUS COMORBIDITY AND BODY MASS INDEX (BMI) OF 33.0 TO 33.9 IN ADULT: ICD-10-CM

## 2022-06-27 DIAGNOSIS — E11.9 ENCOUNTER FOR DIABETIC FOOT EXAM (HCC): ICD-10-CM

## 2022-06-27 LAB — GLUCOSE POC: 106 MG/DL

## 2022-06-27 PROCEDURE — 1123F ACP DISCUSS/DSCN MKR DOCD: CPT | Performed by: NURSE PRACTITIONER

## 2022-06-27 PROCEDURE — 82962 GLUCOSE BLOOD TEST: CPT | Performed by: NURSE PRACTITIONER

## 2022-06-27 PROCEDURE — 3044F HG A1C LEVEL LT 7.0%: CPT | Performed by: NURSE PRACTITIONER

## 2022-06-27 PROCEDURE — 99214 OFFICE O/P EST MOD 30 MIN: CPT | Performed by: NURSE PRACTITIONER

## 2022-06-27 RX ORDER — HYDROCHLOROTHIAZIDE 12.5 MG/1
12.5 TABLET ORAL DAILY
Qty: 30 TABLET | Refills: 0 | Status: SHIPPED | OUTPATIENT
Start: 2022-06-27 | End: 2022-07-27

## 2022-06-27 RX ORDER — GLIMEPIRIDE 1 MG/1
1 TABLET ORAL
Qty: 30 TABLET | Refills: 0 | Status: SHIPPED | OUTPATIENT
Start: 2022-06-27 | End: 2022-07-27 | Stop reason: SDUPTHER

## 2022-06-27 RX ORDER — GLUCOSAM/CHONDRO/HERB 149/HYAL 750-100 MG
1 TABLET ORAL DAILY
Qty: 90 CAPSULE | Refills: 1 | Status: SHIPPED | OUTPATIENT
Start: 2022-06-27

## 2022-06-27 RX ORDER — DUTASTERIDE 0.5 MG/1
0.5 CAPSULE, LIQUID FILLED ORAL DAILY
Qty: 90 CAPSULE | Refills: 1 | Status: SHIPPED | OUTPATIENT
Start: 2022-06-27 | End: 2022-07-27 | Stop reason: SDUPTHER

## 2022-06-27 RX ORDER — AMLODIPINE BESYLATE 10 MG/1
10 TABLET ORAL DAILY
Qty: 30 TABLET | Refills: 0 | Status: SHIPPED | OUTPATIENT
Start: 2022-06-27 | End: 2022-07-27 | Stop reason: SDUPTHER

## 2022-06-27 NOTE — PROGRESS NOTES
Chief Complaint   Patient presents with    Follow-up       Health Maintenance Due   Topic    Eye Exam Retinal or Dilated     Medicare Yearly Exam     Foot Exam Q1     MICROALBUMIN Q1         1. Have you been to the ER, urgent care clinic since your last visit? Hospitalized since your last visit? No    2. Have you seen or consulted any other health care providers outside of the [address] [place] [address] since your last visit? Include any pap smears or colon screening.  No    Visit Vitals  BP (!) 154/73 (BP 1 Location: Right arm, BP Patient Position: Sitting, BP Cuff Size: Adult long)   Pulse 60   Temp 97.3 °F (36.3 °C) (Temporal)   Resp 16   Ht 5' 9\" (1.753 m)   Wt 225 lb 6.4 oz (102.2 kg)   SpO2 98%   BMI 33.29 kg/m²

## 2022-06-27 NOTE — PROGRESS NOTES
[facility]   [address], [address] [facility]  P: [phone]  F: [phone]    SUBJECTIVE     HPI:     Catarina Pace is a 73 y.o. male who is seen in the clinic for   Chief Complaint   Patient presents with    Follow-up      The patient presents today for a follow-up on multiple co-morbidities addressed on 05/19. To improve is DM II, his Amaryl was increased from 1 mg to 2 mg. Also, a referral to Diabetes Educator was placed. He has not scheduled an appointment with the Diabetes Educator. He began taking 2 mg of Amaryl and noticed s/e of the medication, so he switched back to 1 mg. Glucose readings at home have been between 100-120s. Today, it is 106. Diet is not well rounded. Exercises daily. BMI is 33.29. To improve his BP r/t his Hypertensive Cardiomyopathy and Primary Hypertension, HCTZ was added to the patient's medication regimen. Also, a referral to Cardiology was placed. He has not begun taking the medication and he has not made an appointment with Cardiology yet. BP readings at home 140-150s/90s. Of note, he does take Avodart 0.5 mg once daily to manage his BPH. Denies any s/s of BPH. Patient Active Problem List    Diagnosis    HTN (hypertension)    Elevated troponin I level    New onset type 2 diabetes mellitus ([facility] [address]    Benign prostatic hyperplasia with nocturia    Essential hypertension    Obesity (BMI 30.0-34. 9)    Arthritis of lumbar spine    Primary osteoarthritis of right knee        Past Medical History:   Diagnosis Date    Hypertension      Past Surgical History:   Procedure Laterality Date    HX ORTHOPAEDIC      Achilles Tendon/ left foot      Social History     Socioeconomic History    Marital status:      Spouse name: Not on file    Number of children: Not on file    Years of education: Not on file    Highest education level: Not on file   Occupational History    Not on file   Tobacco Use    Smoking status: Never Smoker    Smokeless tobacco: Never Used   Vaping Use    Vaping Use: Never used   Substance and Sexual Activity    Alcohol use: No    Drug use: No    Sexual activity: Yes     Partners: Female   Other Topics Concern    Not on file   Social History Narrative    Not on file     Social Determinants of Health     Financial Resource Strain:     Difficulty of Paying Living Expenses: Not on file   Food Insecurity:     Worried About Running Out of Food in the Last Year: Not on file    Tramaine of Food in the Last Year: Not on file   Transportation Needs:     Lack of Transportation (Medical): Not on file    Lack of Transportation (Non-Medical):  Not on file   Physical Activity:     Days of Exercise per Week: Not on file    Minutes of Exercise per Session: Not on file   Stress:     Feeling of Stress : Not on file   Social Connections:     Frequency of Communication with Friends and Family: Not on file    Frequency of Social Gatherings with Friends and Family: Not on file    Attends [employer] Services: Not on file    Active Member of [address] [place] [address] EPS or Organizations: Not on file    Attends Club or Organization Meetings: Not on file    Marital Status: Not on file   Intimate Partner Violence:     Fear of Current or Ex-Partner: Not on file    Emotionally Abused: Not on file    Physically Abused: Not on file    Sexually Abused: Not on file   Housing Stability:     Unable to Pay for Housing in the Last Year: Not on file    Number of [place] in the Last Year: Not on file    Unstable Housing in the Last Year: Not on file     Family History   Problem Relation Age of Onset    Diabetes Mother     Diabetes Sister     Hypertension Sister     Diabetes Brother     Hypertension Brother      Immunization History   Administered Date(s) Administered    COVID-19, Pfizer Purple top, DILUTE for use, 12+ yrs, 30mcg/0.3mL dose 04/14/2021, 05/05/2021    Influenza Vaccine (Quad) PF (>6 Mo Flulaval, Fluarix, and >3 Yrs Afluria, Fluzone 67967) 02/06/2018    Pneumococcal Polysaccharide (PPSV-23) 12/01/2021      Allergies   Allergen Reactions    Metformin Diarrhea    Thorazine [Chlorpromazine] Shortness of Breath    Prednisone Other (comments)     Hiccups         Office Visit on 06/27/2022   Component Date Value Ref Range Status    Glucose POC 06/27/2022 106  MG/DL Final   Orders Only on 05/19/2022   Component Date Value Ref Range Status    Hep C virus Ab Interp. 05/19/2022 NONREACTIVE  NONREACTIVE   Final    Method used is Siemens Advia Centaur    Cholesterol, total 05/19/2022 188  <200 MG/DL Final    Triglyceride 05/19/2022 104  <150 MG/DL Final    Based on NCEP-ATP III:  Triglycerides <150 mg/dL  is considered normal, 150-199 mg/dL  borderline high,  200-499 mg/dL high and  greater than or equal to 500 mg/dL very high.  HDL Cholesterol 05/19/2022 57  MG/DL Final    Based on NCEP ATP III, HDL Cholesterol <40 mg/dL is considered low and >60 mg/dL is elevated.     LDL, calculated 05/19/2022 110.2* 0 - 100 MG/DL Final    Comment: Based on the NCEP-ATP: LDL-C concentrations are considered  optimal <100 mg/dL,  near optimal/above Normal 100-129 mg/dL  Borderline High: 130-159, High: 160-189 mg/dL  Very High: Greater than or equal to 190 mg/dL      VLDL, calculated 05/19/2022 20.8  MG/DL Final    CHOL/HDL Ratio 05/19/2022 3.3  0.0 - 5.0   Final    Sodium 05/19/2022 140  136 - 145 mmol/L Final    Potassium 05/19/2022 4.6  3.5 - 5.1 mmol/L Final    Chloride 05/19/2022 106  97 - 108 mmol/L Final    CO2 05/19/2022 29  21 - 32 mmol/L Final    Anion gap 05/19/2022 5  5 - 15 mmol/L Final    Glucose 05/19/2022 105* 65 - 100 mg/dL Final    BUN 05/19/2022 17  6 - 20 MG/DL Final    Creatinine 05/19/2022 1.13  0.70 - 1.30 MG/DL Final    BUN/Creatinine ratio 05/19/2022 15  12 - 20   Final    GFR est AA 05/19/2022 >60  >60 ml/min/1.73m2 Final    GFR est non-AA 05/19/2022 >60  >60 ml/min/1.73m2 Final    Estimated GFR is calculated using the IDMS-traceable Modification of Diet in Renal Disease (MDRD) Study equation, reported for both  Americans (GFRAA) and non- Americans (GFRNA), and normalized to 1.73m2 body surface area. The physician must decide which value applies to the patient.  Calcium 05/19/2022 9.8  8.5 - 10.1 MG/DL Final    Bilirubin, total 05/19/2022 0.4  0.2 - 1.0 MG/DL Final    ALT (SGPT) 05/19/2022 35  12 - 78 U/L Final    AST (SGOT) 05/19/2022 27  15 - 37 U/L Final    Alk. phosphatase 05/19/2022 66  45 - 117 U/L Final    Protein, total 05/19/2022 8.6* 6.4 - 8.2 g/dL Final    Albumin 05/19/2022 3.9  3.5 - 5.0 g/dL Final    Globulin 05/19/2022 4.7* 2.0 - 4.0 g/dL Final    A-G Ratio 05/19/2022 0.8* 1.1 - 2.2   Final   Office Visit on 05/19/2022   Component Date Value Ref Range Status    Glucose POC 05/19/2022 102  MG/DL Final   Admission on 05/08/2022, Discharged on 05/10/2022   Component Date Value Ref Range Status    WBC 05/08/2022 5.5  4.1 - 11.1 K/uL Final    RBC 05/08/2022 4.94  4.10 - 5.70 M/uL Final    HGB 05/08/2022 15.5  12.1 - 17.0 g/dL Final    HCT 05/08/2022 45.2  36.6 - 50.3 % Final    MCV 05/08/2022 91.5  80.0 - 99.0 FL Final    MCH 05/08/2022 31.4  26.0 - 34.0 PG Final    MCHC 05/08/2022 34.3  30.0 - 36.5 g/dL Final    RDW 05/08/2022 14.1  11.5 - 14.5 % Final    PLATELET 06/40/3698 063  150 - 400 K/uL Final    MPV 05/08/2022 10.9  8.9 - 12.9 FL Final    NRBC 05/08/2022 0.0  0  WBC Final    ABSOLUTE NRBC 05/08/2022 0.00  0.00 - 0.01 K/uL Final    NEUTROPHILS 05/08/2022 38  32 - 75 % Final    LYMPHOCYTES 05/08/2022 48  12 - 49 % Final    MONOCYTES 05/08/2022 6  5 - 13 % Final    EOSINOPHILS 05/08/2022 7  0 - 7 % Final    BASOPHILS 05/08/2022 1  0 - 1 % Final    IMMATURE GRANULOCYTES 05/08/2022 0  0.0 - 0.5 % Final    ABS. NEUTROPHILS 05/08/2022 2.1  1.8 - 8.0 K/UL Final    ABS. LYMPHOCYTES 05/08/2022 2.6  0.8 - 3.5 K/UL Final    ABS.  MONOCYTES 05/08/2022 0.3  0.0 - 1.0 K/UL Final    ABS. EOSINOPHILS 05/08/2022 0.4  0.0 - 0.4 K/UL Final    ABS. BASOPHILS 05/08/2022 0.0  0.0 - 0.1 K/UL Final    ABS. IMM. GRANS. 05/08/2022 0.0  0.00 - 0.04 K/UL Final    DF 05/08/2022 AUTOMATED    Final    Sodium 05/08/2022 138  136 - 145 mmol/L Final    Potassium 05/08/2022 3.9  3.5 - 5.1 mmol/L Final    Chloride 05/08/2022 107  97 - 108 mmol/L Final    CO2 05/08/2022 27  21 - 32 mmol/L Final    Anion gap 05/08/2022 4* 5 - 15 mmol/L Final    Glucose 05/08/2022 109* 65 - 100 mg/dL Final    BUN 05/08/2022 14  6 - 20 MG/DL Final    Creatinine 05/08/2022 1.07  0.70 - 1.30 MG/DL Final    BUN/Creatinine ratio 05/08/2022 13  12 - 20   Final    GFR est AA 05/08/2022 >60  >60 ml/min/1.73m2 Final    GFR est non-AA 05/08/2022 >60  >60 ml/min/1.73m2 Final    Estimated GFR is calculated using the IDMS-traceable Modification of Diet in Renal Disease (MDRD) Study equation, reported for both  Americans (GFRAA) and non- Americans (GFRNA), and normalized to 1.73m2 body surface area. The physician must decide which value applies to the patient.  Calcium 05/08/2022 8.9  8.5 - 10.1 MG/DL Final    Bilirubin, total 05/08/2022 0.4  0.2 - 1.0 MG/DL Final    ALT (SGPT) 05/08/2022 40  12 - 78 U/L Final    AST (SGOT) 05/08/2022 25  15 - 37 U/L Final    Alk. phosphatase 05/08/2022 71  45 - 117 U/L Final    Protein, total 05/08/2022 8.8* 6.4 - 8.2 g/dL Final    Albumin 05/08/2022 3.8  3.5 - 5.0 g/dL Final    Globulin 05/08/2022 5.0* 2.0 - 4.0 g/dL Final    A-G Ratio 05/08/2022 0.8* 1.1 - 2.2   Final    Troponin-High Sensitivity 05/08/2022 126* 0 - 76 ng/L Final    Comment: Up to 50% of normal patients may have low levels of detectable troponin (within reference range) circulating in the blood. Mild elevations in troponin that are above the reference range, may be present with other cardiac and non-cardiac disease states.  Two or three serial tests at two hour intervals, are recommended to evaluate changes in circulating troponin over time. CALLED TO AND READ BACK BY  FANY PEREIRA AT 1008. TJS      Ventricular Rate 05/08/2022 67  BPM Final    Atrial Rate 05/08/2022 67  BPM Final    P-R Interval 05/08/2022 164  ms Final    QRS Duration 05/08/2022 88  ms Final    Q-T Interval 05/08/2022 394  ms Final    QTC Calculation (Bezet) 05/08/2022 416  ms Final    Calculated P Axis 05/08/2022 58  degrees Final    Calculated R Axis 05/08/2022 -51  degrees Final    Calculated T Axis 05/08/2022 9  degrees Final    Diagnosis 05/08/2022    Final                    Value:Normal sinus rhythm  Nonspecific ST abnormality    No previous ECGs available  Confirmed by James Bullard M.D., [place] ([id number]) on 5/8/2022 2:44:27 PM      SAMPLES BEING HELD 05/08/2022 1 RED   Final    COMMENT 05/08/2022 Add-on orders for these samples will be processed based on acceptable specimen integrity and analyte stability, which may vary by analyte. Final    Troponin-High Sensitivity 05/08/2022 128* 0 - 76 ng/L Final    Comment: Up to 50% of normal patients may have low levels of detectable troponin (within reference range) circulating in the blood. Mild elevations in troponin that are above the reference range, may be present with other cardiac and non-cardiac disease states. Two or three serial tests at two hour intervals, are recommended to evaluate changes in circulating troponin over time. CALLED TO AND READ BACK BY  KI SOARES [facility]      Troponin-High Sensitivity 05/08/2022 121* 0 - 76 ng/L Final    Comment: Up to 50% of normal patients may have low levels of detectable troponin (within reference range) circulating in the blood. Mild elevations in troponin that are above the reference range, may be present with other cardiac and non-cardiac disease states. Two or three serial tests at two hour intervals, are recommended to evaluate changes in circulating troponin over time.   CALLED TO AND READ BACK BY  Jaquan Corley RN @2061/RICKY Mixon Ventricular Rate 05/08/2022 61  BPM Final    Atrial Rate 05/08/2022 61  BPM Final    P-R Interval 05/08/2022 180  ms Final    QRS Duration 05/08/2022 90  ms Final    Q-T Interval 05/08/2022 402  ms Final    QTC Calculation (Bezet) 05/08/2022 404  ms Final    Calculated P Axis 05/08/2022 46  degrees Final    Calculated R Axis 05/08/2022 -49  degrees Final    Calculated T Axis 05/08/2022 -3  degrees Final    Diagnosis 05/08/2022    Final                    Value:Normal sinus rhythm  Left anterior fascicular block  When compared with ECG of 08-MAY-2022 08:45,  No significant change was found  Confirmed by Te Nelson M.D., [facility] [place] ([id number]) on 5/9/2022 4:03:17 PM      Cholesterol, total 05/09/2022 166  <200 MG/DL Final    Triglyceride 05/09/2022 91  <150 MG/DL Final    Based on NCEP-ATP III:  Triglycerides <150 mg/dL  is considered normal, 150-199 mg/dL  borderline high,  200-499 mg/dL high and  greater than or equal to 500 mg/dL very high.  HDL Cholesterol 05/09/2022 45  MG/DL Final    Based on NCEP ATP III, HDL Cholesterol <40 mg/dL is considered low and >60 mg/dL is elevated.     LDL, calculated 05/09/2022 102.8* 0 - 100 MG/DL Final    Comment: Based on the NCEP-ATP: LDL-C concentrations are considered  optimal <100 mg/dL,  near optimal/above Normal 100-129 mg/dL  Borderline High: 130-159, High: 160-189 mg/dL  Very High: Greater than or equal to 190 mg/dL      VLDL, calculated 05/09/2022 18.2  MG/DL Final    CHOL/HDL Ratio 05/09/2022 3.7  0.0 - 5.0   Final    Magnesium 05/09/2022 2.3  1.6 - 2.4 mg/dL Final    SPECIMEN HEMOLYZED, RESULTS MAY BE AFFECTED    Sodium 05/09/2022 136  136 - 145 mmol/L Final    Potassium 05/09/2022 4.6  3.5 - 5.1 mmol/L Final    SPECIMEN HEMOLYZED, RESULTS MAY BE AFFECTED    Chloride 05/09/2022 107  97 - 108 mmol/L Final    CO2 05/09/2022 26  21 - 32 mmol/L Final    Anion gap 05/09/2022 3* 5 - 15 mmol/L Final    Glucose 05/09/2022 112* 65 - 100 mg/dL Final    BUN 05/09/2022 16  6 - 20 MG/DL Final    Creatinine 05/09/2022 1.13  0.70 - 1.30 MG/DL Final    BUN/Creatinine ratio 05/09/2022 14  12 - 20   Final    GFR est AA 05/09/2022 >60  >60 ml/min/1.73m2 Final    GFR est non-AA 05/09/2022 >60  >60 ml/min/1.73m2 Final    Calcium 05/09/2022 8.5  8.5 - 10.1 MG/DL Final    Troponin-High Sensitivity 05/09/2022 128* 0 - 76 ng/L Final    Comment: Up to 50% of normal patients may have low levels of detectable troponin (within reference range) circulating in the blood. Mild elevations in troponin that are above the reference range, may be present with other cardiac and non-cardiac disease states. Two or three serial tests at two hour intervals, are recommended to evaluate changes in circulating troponin over time.   CALLED TO AND READ BACK BY  THEE SOARES 0231 [facility]      Hemoglobin A1c 05/09/2022 6.4* 4.0 - 5.6 % Final    Comment: NEW METHOD  PLEASE NOTE NEW REFERENCE RANGE  (NOTE)  HbA1C Interpretive Ranges  <5.7              Normal  5.7 - 6.4         Consider Prediabetes  >6.5              Consider Diabetes      Est. average glucose 05/09/2022 137  mg/dL Final    Stress Target HR 05/09/2022 155  bpm Final    Baseline Systolic BP 80/51/2625 998  mmHg Final    Baseline Diastolic BP 47/31/5658 88  mmHg Final    Stress Stage 1 Duration 05/09/2022 1  min:sec Final    Stress Stage 1 HR 05/09/2022 73  bpm Final    Stress Stage 2 Duration 05/09/2022 1  min:sec Final    Stress Stage 2 HR 05/09/2022 96  bpm Final    Stress Stage 2 BP 05/09/2022 166/81  mmHg Final    Stress Stage 3 Duration 05/09/2022 1  min:sec Final    Stress Stage 3 HR 05/09/2022 87  bpm Final    Stress Stage 4 Duration 05/09/2022 1  min:sec Final    Recovery Stage 1 Duration 05/09/2022 1  min:sec Final    Recovery Stage 1 HR 05/09/2022 83  bpm Final    Recovery Stage 1 BP 05/09/2022 190/83  mmHg Final    Recovery Stage 2 Duration 05/09/2022 1  min:sec Final    Recovery Stage 2 HR 05/09/2022 82  bpm Final    Recovery Stage 3 Duration 05/09/2022 1  min:sec Final    Recovery Stage 3 HR 05/09/2022 78  bpm Final    Recovery Stage 3 BP 05/09/2022 170/86  mmHg Final    Exercise Duration Time 05/09/2022 3  min Final    Exercuse Duration Seconds 05/09/2022 0  sec Final    Stress Systolic BP 77/14/2881 862  mmHg Final    Stress Diastolic BP 98/07/0839 83  mmHg Final    Stress Peak HR 05/09/2022 96  BPM Final    Baseline HR 05/09/2022 64  BPM Final    Stress Estimated Workload 05/09/2022 1.0  METS Final    Stress Rate Pressure Product 05/09/2022 18,240  BPM*mmHg Final    Stress Percent HR Achieved 05/09/2022 62  % Final    Baseline ST Depression 05/09/2022 0  mm Final    IVSd 05/10/2022 1.1* 0.6 - 1.0 cm Final    LVIDd 05/10/2022 4.2  4.2 - 5.9 cm Final    LVIDs 05/10/2022 3.2  cm Final    LVPWd 05/10/2022 1.6* 0.6 - 1.0 cm Final    LVOT Peak Gradient 05/10/2022 3  mmHg Final    LVOT Peak Velocity 05/10/2022 0.8  m/s Final    LA Diameter 05/10/2022 3.0  cm Final    AV Peak Gradient 05/10/2022 8  mmHg Final    AV Peak Velocity 05/10/2022 1.4  m/s Final    MV A Velocity 05/10/2022 0.78  m/s Final    MV E Wave Deceleration Time 05/10/2022 314.5  ms Final    MV E Velocity 05/10/2022 0.51  m/s Final    LV E' Lateral Velocity 05/10/2022 6  cm/s Final    LV E' Septal Velocity 05/10/2022 8  cm/s Final    MV PHT 05/10/2022 91.2  ms Final    MV Area by PHT 05/10/2022 2.4  cm2 Final    PV Peak Gradient 05/10/2022 5  mmHg Final    PV Max Velocity 05/10/2022 1.1  m/s Final    TAPSE 05/10/2022 2.0  1.7 cm Final    TR Peak Gradient 05/10/2022 20  mmHg Final    TR Max Velocity 05/10/2022 2.26  m/s Final    Aortic Root 05/10/2022 3.9  cm Final    Fractional Shortening 2D 05/10/2022 24  28 - 44 % Final    LVIDd Index 05/10/2022 1.91  cm/m2 Final    LVIDs Index 05/10/2022 1.45  cm/m2 Final    LV RWT Ratio 05/10/2022 0.76   Final    LV Mass 2D 05/10/2022 212.3  88 - 224 g Final    LV Mass 2D Index 05/10/2022 96.5  49 - 115 g/m2 Final    MV E/A 05/10/2022 0.65   Final    E/E' Ratio (Averaged) 05/10/2022 7.44   Final    E/E' Lateral 05/10/2022 8.50   Final    E/E' Septal 05/10/2022 6.38   Final    LA Size Index 05/10/2022 1.36  cm/m2 Final    LA/AO Root Ratio 05/10/2022 0.77   Final    Ao Root Index 05/10/2022 1.77  cm/m2 Final    AV Velocity Ratio 05/10/2022 0.57   Final    Glucose (POC) 05/09/2022 78  65 - 117 mg/dL Final    Comment: (NOTE)  The FDA has indicated that no capillary point of care blood glucose  monitoring systems are approved for use in \"critically ill\" patients,  however they have not defined this population. The College of  American Pathologists has recommended that these devices should not  be used in cases such as severe hypotension, dehydration, shock, and  hyperglycemic-hyperosmolar state, amongst others. Venous or arterial  collection is the recommended specimen for testing these patients.  Performed by 05/09/2022 Dyana Carney   Final    Glucose (POC) 05/09/2022 108  65 - 117 mg/dL Final    Comment: (NOTE)  The FDA has indicated that no capillary point of care blood glucose  monitoring systems are approved for use in \"critically ill\" patients,  however they have not defined this population. The College of  American Pathologists has recommended that these devices should not  be used in cases such as severe hypotension, dehydration, shock, and  hyperglycemic-hyperosmolar state, amongst others. Venous or arterial  collection is the recommended specimen for testing these patients.  Performed by 05/09/2022 Cris Ferguson   Final    WBC 05/10/2022 6.0  4.1 - 11.1 K/uL Final    RBC 05/10/2022 5.05  4. 10 - 5.70 M/uL Final    HGB 05/10/2022 15.9  12.1 - 17.0 g/dL Final    HCT 05/10/2022 46.7  36.6 - 50.3 % Final    MCV 05/10/2022 92.5  80.0 - 99.0 FL Final    MCH 05/10/2022 31.5  26.0 - 34.0 PG Final    MCHC 05/10/2022 34.0  30.0 - 36.5 g/dL Final    RDW 05/10/2022 14.2 11.5 - 14.5 % Final    PLATELET 84/34/9992 661  150 - 400 K/uL Final    MPV 05/10/2022 10.9  8.9 - 12.9 FL Final    NRBC 05/10/2022 0.0  0  WBC Final    ABSOLUTE NRBC 05/10/2022 0.00  0.00 - 0.01 K/uL Final    Sodium 05/10/2022 137  136 - 145 mmol/L Final    Potassium 05/10/2022 4.1  3.5 - 5.1 mmol/L Final    Chloride 05/10/2022 108  97 - 108 mmol/L Final    CO2 05/10/2022 25  21 - 32 mmol/L Final    Anion gap 05/10/2022 4* 5 - 15 mmol/L Final    Glucose 05/10/2022 119* 65 - 100 mg/dL Final    BUN 05/10/2022 20  6 - 20 MG/DL Final    Creatinine 05/10/2022 1.21  0.70 - 1.30 MG/DL Final    BUN/Creatinine ratio 05/10/2022 17  12 - 20   Final    GFR est AA 05/10/2022 >60  >60 ml/min/1.73m2 Final    GFR est non-AA 05/10/2022 >60  >60 ml/min/1.73m2 Final    Calcium 05/10/2022 8.2* 8.5 - 10.1 MG/DL Final    Bilirubin, total 05/10/2022 0.4  0.2 - 1.0 MG/DL Final    ALT (SGPT) 05/10/2022 32  12 - 78 U/L Final    AST (SGOT) 05/10/2022 19  15 - 37 U/L Final    Alk. phosphatase 05/10/2022 63  45 - 117 U/L Final    Protein, total 05/10/2022 8.0  6.4 - 8.2 g/dL Final    Albumin 05/10/2022 3.3* 3.5 - 5.0 g/dL Final    Globulin 05/10/2022 4.7* 2.0 - 4.0 g/dL Final    A-G Ratio 05/10/2022 0.7* 1.1 - 2.2   Final    Magnesium 05/10/2022 2.3  1.6 - 2.4 mg/dL Final    Glucose (POC) 05/10/2022 114  65 - 117 mg/dL Final    Comment: (NOTE)  The FDA has indicated that no capillary point of care blood glucose  monitoring systems are approved for use in \"critically ill\" patients,  however they have not defined this population. The College of  American Pathologists has recommended that these devices should not  be used in cases such as severe hypotension, dehydration, shock, and  hyperglycemic-hyperosmolar state, amongst others. Venous or arterial  collection is the recommended specimen for testing these patients.       Performed by 05/10/2022 Lorrie Jade   Final      ECHO ADULT COMPLETE    Left Ventricle: Low normal left ventricular systolic function with a   visually estimated EF of 50 - 55%. Left ventricle size is normal. Mildly   increased wall thickness. Normal wall motion. Diastolic dysfunction   present with normal LV EF.   Aorta: Mildly dilated aortic root.   Contrast used: Definity. Current Outpatient Medications   Medication Sig Dispense Refill    glimepiride (AMARYL) 1 mg tablet Take 1 Tablet by mouth Daily (before breakfast). Needs appointment. 30 Tablet 0    hydroCHLOROthiazide (HYDRODIURIL) 12.5 mg tablet Take 1 Tablet by mouth daily. Needs appointment. 30 Tablet 0    amLODIPine (NORVASC) 10 mg tablet Take 1 Tablet by mouth daily. Needs appointment. 30 Tablet 0    dutasteride (AVODART) 0.5 mg capsule Take 1 Capsule by mouth daily. 90 Capsule 1    omega 3-DHA-EPA-fish oil (Fish OiL) 1,000 mg (120 mg-180 mg) capsule Take 1 Capsule by mouth daily. 90 Capsule 1    Blood-Glucose Meter monitoring kit Test once daily before breakfast 1 Kit 1    glucose blood VI test strips (blood glucose test) strip Test as directed in clinic. Dx: Diabetes mellitus Type 2 200 Strip 5    lancets misc Please use one lancet with every blood sugar reading 1 Each 11    aspirin 81 mg chewable tablet Take 1 Tablet by mouth daily. 30 Tablet 0           The past medical history, past surgical history, and family history were reviewed and updated in the medical record. Lab values/Imaging were reviewed. The medications were reviewed and updated in the medical record. Immunizations were reviewed and updated in the medical record. All relevant preventative screenings reviewed and updated in the medical record. REVIEW OF SYSTEMS   Review of Systems   Constitutional: Negative for malaise/fatigue. Eyes: Negative for blurred vision. Respiratory: Negative for cough, shortness of breath and wheezing. Cardiovascular: Negative for chest pain, palpitations, orthopnea, leg swelling and PND.    Genitourinary: Negative for dysuria, frequency and urgency. Neurological: Negative for dizziness, tingling, weakness and headaches. PHYSICAL EXAM   BP (!) 148/96 (BP 1 Location: Right arm, BP Patient Position: Sitting, BP Cuff Size: Adult long)   Pulse 60   Temp 97.3 °F (36.3 °C) (Temporal)   Resp 16   Ht 5' 9\" (1.753 m)   Wt 225 lb 6.4 oz (102.2 kg)   SpO2 98%   BMI 33.29 kg/m²      Physical Exam  Constitutional:       General: He is not in acute distress. Appearance: He is not ill-appearing or diaphoretic. Cardiovascular:      Rate and Rhythm: Normal rate and regular rhythm. Pulses: Normal pulses. Heart sounds: Normal heart sounds. No murmur heard. Comments: Non-pitting LE edema  Pulmonary:      Effort: Pulmonary effort is normal. No respiratory distress. Breath sounds: Normal breath sounds. Skin:     Capillary Refill: Capillary refill takes less than 2 seconds. Neurological:      General: No focal deficit present. Mental Status: He is alert. Cranial Nerves: No cranial nerve deficit. Motor: No weakness. Subjective:         Current Outpatient Medications   Medication Sig Dispense Refill    glimepiride (AMARYL) 1 mg tablet Take 1 Tablet by mouth Daily (before breakfast). Needs appointment. 30 Tablet 0    hydroCHLOROthiazide (HYDRODIURIL) 12.5 mg tablet Take 1 Tablet by mouth daily. Needs appointment. 30 Tablet 0    amLODIPine (NORVASC) 10 mg tablet Take 1 Tablet by mouth daily. Needs appointment. 30 Tablet 0    dutasteride (AVODART) 0.5 mg capsule Take 1 Capsule by mouth daily. 90 Capsule 1    omega 3-DHA-EPA-fish oil (Fish OiL) 1,000 mg (120 mg-180 mg) capsule Take 1 Capsule by mouth daily. 90 Capsule 1    Blood-Glucose Meter monitoring kit Test once daily before breakfast 1 Kit 1    glucose blood VI test strips (blood glucose test) strip Test as directed in clinic.   Dx: Diabetes mellitus Type 2 200 Strip 5    lancets misc Please use one lancet with every blood sugar reading 1 Each 11    aspirin 81 mg chewable tablet Take 1 Tablet by mouth daily. 30 Tablet 0        Constitutional: negative  Respiratory: negative  Cardiovascular: negative      Objective:     Vitals:    06/27/22 0859 06/27/22 0904 06/27/22 0924   BP: (!) 142/73 (!) 154/73 (!) 148/96   Pulse: 60     Resp: 16     Temp: 97.3 °F (36.3 °C)     TempSrc: Temporal     SpO2: 98%     Weight: 225 lb 6.4 oz (102.2 kg)     Height: 5' 9\" (1.753 m)         Wt Readings from Last 3 Encounters:   06/27/22 225 lb 6.4 oz (102.2 kg)   05/19/22 228 lb 12.8 oz (103.8 kg)   05/10/22 231 lb 7.7 oz (105 kg)     Temp Readings from Last 3 Encounters:   06/27/22 97.3 °F (36.3 °C) (Temporal)   05/19/22 98 °F (36.7 °C) (Temporal)   05/10/22 97.1 °F (36.2 °C)     BP Readings from Last 3 Encounters:   06/27/22 (!) 148/96   05/19/22 (!) 149/73   05/10/22 (!) 161/83     Pulse Readings from Last 3 Encounters:   06/27/22 60   05/19/22 68   05/10/22 74       Lab Results   Component Value Date/Time    Hemoglobin A1c 6.4 (H) 05/09/2022 05:47 AM    Hemoglobin A1c 6.3 (H) 12/01/2021 02:40 PM    Hemoglobin A1c 6.2 (H) 05/19/2021 03:57 PM       Diabetic foot exam:   Left: Reflexes 3+     Vibratory sensation normal    Proprioception normal   Sharp/dull discrimination normal    Filament test normal sensation with micro filament  Right: Reflexes 4+   Vibratory sensation normal   Proprioception normal   Sharp/dull discrimination normal   Filament test normal sensation with micro filament          ASSESSMENT/ PLAN   Below is the assessment and plan developed based on review of pertinent history, physical exam, labs, studies, and medications. 1. Primary hypertension  Referral to Cardiology information provided to the patient. Advised to take medications as prescribed. -     hydroCHLOROthiazide (HYDRODIURIL) 12.5 mg tablet; Take 1 Tablet by mouth daily. Needs appointment., Normal, Disp-30 Tablet, R-0  -     amLODIPine (NORVASC) 10 mg tablet;  Take 1 Tablet by mouth daily. Needs appointment., Normal, Disp-30 Tablet, R-0Patient needs to the patient care with another provider. 2. Cardiomyopathy due to hypertension, without heart failure [facility])  Referral to Cardiology information provided to the patient. -     hydroCHLOROthiazide (HYDRODIURIL) 12.5 mg tablet; Take 1 Tablet by mouth daily. Needs appointment., Normal, Disp-30 Tablet, R-0  3. Benign prostatic hyperplasia without lower urinary tract symptoms  -     dutasteride (AVODART) 0.5 mg capsule; Take 1 Capsule by mouth daily. , Normal, Disp-90 Capsule, R-1  4. Type 2 diabetes mellitus without complication, without long-term current use of insulin ([facility]  Diabetes Educator information provided to the patient. Will re-check HA1C in August.   -     AMB POC GLUCOSE BLOOD, BY GLUCOSE MONITORING DEVICE  -     MICROALBUMIN, UR, RAND W/ MICROALB/CREAT RATIO; Future  -     glimepiride (AMARYL) 1 mg tablet; Take 1 Tablet by mouth Daily (before breakfast). Needs appointment., Normal, Disp-30 Tablet, R-0  5. Class 1 obesity due to excess calories with serious comorbidity and body mass index (BMI) of 33.0 to 33.9 in adult  Advised on dietary/lifestyle modifications to loose weight. 6. Elevated LDL cholesterol level  Will re-check Lipid Panel in August.   -     omega 3-DHA-EPA-fish oil (Fish OiL) 1,000 mg (120 mg-180 mg) capsule; Take 1 Capsule by mouth daily. , Normal, Disp-90 Capsule, R-1  7. Encounter for diabetic foot exam ([facility].)           Follow-up and Dispositions    · Return in about 4 weeks (around 7/25/2022) for Re-assess BP w/ HCTZ. Disclaimer:  Advised patient to call back or return to office if symptoms worsen/change/persist.  Discussed expected course/resolution/complications of diagnosis in detail with patient. Medication risks/benefits/alternatives discussed with patient. Patient was given an after visit summary which includes diagnoses, current medications, & vitals.       Discussed patient instructions and advised to read to all patient instructions regarding care. Patient expressed understanding with the diagnosis and plan.        Stanislav Minaya NP  6/27/2022

## 2022-07-08 ENCOUNTER — OFFICE VISIT (OUTPATIENT)
Dept: CARDIOLOGY CLINIC | Age: 67
End: 2022-07-08
Payer: MEDICARE

## 2022-07-08 VITALS
WEIGHT: 225.8 LBS | OXYGEN SATURATION: 97 % | SYSTOLIC BLOOD PRESSURE: 160 MMHG | BODY MASS INDEX: 33.44 KG/M2 | HEIGHT: 69 IN | RESPIRATION RATE: 14 BRPM | HEART RATE: 74 BPM | DIASTOLIC BLOOD PRESSURE: 100 MMHG

## 2022-07-08 DIAGNOSIS — I71.21 ASCENDING AORTIC ANEURYSM: ICD-10-CM

## 2022-07-08 DIAGNOSIS — I10 BENIGN ESSENTIAL HTN: Primary | ICD-10-CM

## 2022-07-08 DIAGNOSIS — E11.8 DM TYPE 2, CONTROLLED, WITH COMPLICATION (HCC): ICD-10-CM

## 2022-07-08 DIAGNOSIS — I51.7 LVH (LEFT VENTRICULAR HYPERTROPHY): ICD-10-CM

## 2022-07-08 PROCEDURE — 2022F DILAT RTA XM EVC RTNOPTHY: CPT | Performed by: INTERNAL MEDICINE

## 2022-07-08 PROCEDURE — G8427 DOCREV CUR MEDS BY ELIG CLIN: HCPCS | Performed by: INTERNAL MEDICINE

## 2022-07-08 PROCEDURE — 3017F COLORECTAL CA SCREEN DOC REV: CPT | Performed by: INTERNAL MEDICINE

## 2022-07-08 PROCEDURE — G8753 SYS BP > OR = 140: HCPCS | Performed by: INTERNAL MEDICINE

## 2022-07-08 PROCEDURE — 1101F PT FALLS ASSESS-DOCD LE1/YR: CPT | Performed by: INTERNAL MEDICINE

## 2022-07-08 PROCEDURE — G8755 DIAS BP > OR = 90: HCPCS | Performed by: INTERNAL MEDICINE

## 2022-07-08 PROCEDURE — 3044F HG A1C LEVEL LT 7.0%: CPT | Performed by: INTERNAL MEDICINE

## 2022-07-08 PROCEDURE — 1123F ACP DISCUSS/DSCN MKR DOCD: CPT | Performed by: INTERNAL MEDICINE

## 2022-07-08 PROCEDURE — G8417 CALC BMI ABV UP PARAM F/U: HCPCS | Performed by: INTERNAL MEDICINE

## 2022-07-08 PROCEDURE — 99214 OFFICE O/P EST MOD 30 MIN: CPT | Performed by: INTERNAL MEDICINE

## 2022-07-08 PROCEDURE — G8536 NO DOC ELDER MAL SCRN: HCPCS | Performed by: INTERNAL MEDICINE

## 2022-07-08 PROCEDURE — G8510 SCR DEP NEG, NO PLAN REQD: HCPCS | Performed by: INTERNAL MEDICINE

## 2022-07-08 NOTE — LETTER
7/8/2022 11:38 AM    Patient:  Jacky Gtz   YOB: 1955  Date of Visit: 7/8/2022      To whom it may concern:  Mr Jacky Gtz was seen in our office today. If you have any questions please call us at [phone].         Sincerely,      Geraldine York MD

## 2022-07-08 NOTE — PROGRESS NOTES
[facility] [place]: Nolan Speak  [phone]    History of Present Illness:  Mr. Kingsley Skiff is a 71 yo M with essential hypertension, type 2 diabetes, recent hospitalization in May for hypertension and elevated troponin. He was sent here by his primary care physician to establish a cardiologist.  Back in May, they did do an echocardiogram that demonstrated EF of 55%, some degree of LVH and mildly dilated aorta at 3.9 cm. He had a stress test with no ischemia. Since then, he has been doing okay without any chest pain, shortness of breath, palpitations, lightheadedness or dizziness. He has been compliant with his blood pressure medications. When he checks his blood pressure at home, it is mostly in the 785M systolic. It is 162/90 here consistent with some degree of white coat hypertension. He is accompanied by his wife. He says he does a lot of activity and exertion with his job at [employer]. He is compensated on exam with clear lungs and no lower extremity edema. Soc hx. No tobacco. , [employer]  Fam hx. No early CAD  Assessment and Plan:   1. Essential hypertension. His blood pressure is still not completely at goal here. I do think it is reasonable to focus on lifestyle modification with minimizing salt intake. Incorporate regular cardiovascular exercise and dietary changes. If in three months his blood pressure is still up, I do think he will need to add something for his blood pressure. The goal blood pressure should be in the 663R-487F systolic. He will check his blood pressure at least twice a week and monitor this closely. 2. Aortic aneurysm. Mildly dilated at 3.9 cm. Consider repeat echocardiogram in one year and will discuss this at his next appointment. 3. Type 2 diabetes. 4. Obesity. 5. Arthritis. He  has a past medical history of Hypertension. All other systems negative except as above.      PE  Vitals:    07/08/22 1101   BP: (!) 162/90   Pulse: 74   Resp: 14   SpO2: 97%   Weight: 225 lb 12.8 oz (102.4 kg)   Height: 5' 9\" (1.753 m)    Body mass index is 33.34 kg/m². General appearance - alert, well appearing, and in no distress  Mental status - affect appropriate to mood  Eyes - sclera anicteric, moist mucous membranes  Neck - supple, no JVD  Chest - clear to auscultation, no wheezes, rales or rhonchi  Heart - normal rate, regular rhythm, normal S1, S2, no murmurs, rubs, clicks or gallops  Abdomen - soft, nontender, nondistended, no masses or organomegaly  Neurological -  no focal deficit  Extremities - peripheral pulses normal, no pedal edema    Recent Labs:  Lab Results   Component Value Date/Time    Cholesterol, total 188 05/19/2022 10:21 AM    HDL Cholesterol 57 05/19/2022 10:21 AM    LDL, calculated 110.2 (H) 05/19/2022 10:21 AM    Triglyceride 104 05/19/2022 10:21 AM    CHOL/HDL Ratio 3.3 05/19/2022 10:21 AM     Lab Results   Component Value Date/Time    Creatinine 1.13 05/19/2022 10:21 AM     Lab Results   Component Value Date/Time    BUN 17 05/19/2022 10:21 AM     Lab Results   Component Value Date/Time    Potassium 4.6 05/19/2022 10:21 AM     Lab Results   Component Value Date/Time    Hemoglobin A1c 6.4 (H) 05/09/2022 05:47 AM     Lab Results   Component Value Date/Time    HGB 15.9 05/10/2022 04:28 AM     Lab Results   Component Value Date/Time    PLATELET 155 77/52/3095 04:28 AM       Reviewed:  Past Medical History:   Diagnosis Date    Hypertension      Social History     Tobacco Use   Smoking Status Never Smoker   Smokeless Tobacco Never Used     Social History     Substance and Sexual Activity   Alcohol Use No     Allergies   Allergen Reactions    Metformin Diarrhea    Thorazine [Chlorpromazine] Shortness of Breath    Prednisone Other (comments)     Hiccups         Current Outpatient Medications   Medication Sig    glimepiride (AMARYL) 1 mg tablet Take 1 Tablet by mouth Daily (before breakfast). Needs appointment.     hydroCHLOROthiazide (HYDRODIURIL) 12.5 mg tablet Take 1 Tablet by mouth daily. Needs appointment.  amLODIPine (NORVASC) 10 mg tablet Take 1 Tablet by mouth daily. Needs appointment.  dutasteride (AVODART) 0.5 mg capsule Take 1 Capsule by mouth daily.  omega 3-DHA-EPA-fish oil (Fish OiL) 1,000 mg (120 mg-180 mg) capsule Take 1 Capsule by mouth daily.  Blood-Glucose Meter monitoring kit Test once daily before breakfast    glucose blood VI test strips (blood glucose test) strip Test as directed in clinic. Dx: Diabetes mellitus Type 2    lancets misc Please use one lancet with every blood sugar reading    aspirin 81 mg chewable tablet Take 1 Tablet by mouth daily. No current facility-administered medications for this visit.        Bk Alonso MD  [facility] heart and [facility] [place]  [address] [facility] [address]  [facility], [address]

## 2022-07-09 ENCOUNTER — HOSPITAL ENCOUNTER (EMERGENCY)
Age: 67
Discharge: HOME | End: 2022-07-09
Attending: EMERGENCY MEDICINE
Payer: MEDICARE

## 2022-07-09 VITALS
SYSTOLIC BLOOD PRESSURE: 158 MMHG | OXYGEN SATURATION: 98 % | DIASTOLIC BLOOD PRESSURE: 96 MMHG | HEART RATE: 70 BPM | TEMPERATURE: 97.8 F | RESPIRATION RATE: 16 BRPM

## 2022-07-09 DIAGNOSIS — L03.012 PARONYCHIA OF LEFT MIDDLE FINGER: Primary | ICD-10-CM

## 2022-07-09 PROCEDURE — 99283 EMERGENCY DEPT VISIT LOW MDM: CPT

## 2022-07-09 RX ORDER — CEPHALEXIN 500 MG/1
500 CAPSULE ORAL 3 TIMES DAILY
Qty: 21 CAPSULE | Refills: 0 | Status: SHIPPED | OUTPATIENT
Start: 2022-07-09 | End: 2022-07-16

## 2022-07-09 NOTE — ED PROVIDER NOTES
Date of Service:  (Not on file)    Patient:  Laura Kaye    Chief Complaint:  Finger Pain       HPI:  Laura Kaye is a 73 y.o.  male who presents for evaluation of pain of the left distal third digit. Patient denies any known trauma or injury. Patient states an infection to the distal left third finger. Patient states yellow drainage from finger 1 day ago. Patient denies any numbness or tingling to left third digit. Patient denies fever, chills, or body aches. Patient denies chest pain, shortness of breath, abdominal pain. Past Medical History:   Diagnosis Date    Hypertension        Past Surgical History:   Procedure Laterality Date    HX ORTHOPAEDIC      Achilles Tendon/ left foot          Family History:   Problem Relation Age of Onset    Diabetes Mother     Diabetes Sister     Hypertension Sister     Diabetes Brother     Hypertension Brother        Social History     Socioeconomic History    Marital status:      Spouse name: Not on file    Number of children: Not on file    Years of education: Not on file    Highest education level: Not on file   Occupational History    Not on file   Tobacco Use    Smoking status: Never Smoker    Smokeless tobacco: Never Used   Vaping Use    Vaping Use: Never used   Substance and Sexual Activity    Alcohol use: No    Drug use: No    Sexual activity: Yes     Partners: Female   Other Topics Concern    Not on file   Social History Narrative    Not on file     Social Determinants of Health     Financial Resource Strain:     Difficulty of Paying Living Expenses: Not on file   Food Insecurity:     Worried About [address] in the Last Year: Not on file    Tramaine of Food in the Last Year: Not on file   Transportation Needs:     Lack of Transportation (Medical): Not on file    Lack of Transportation (Non-Medical):  Not on file   Physical Activity:     Days of Exercise per Week: Not on file    Minutes of Exercise per Session: Not on file   Stress:     Feeling of Stress : Not on file   Social Connections:     Frequency of Communication with Friends and Family: Not on file    Frequency of Social Gatherings with Friends and Family: Not on file    Attends Cheondoism Services: Not on file    Active Member of Clubs or Organizations: Not on file    Attends Club or Organization Meetings: Not on file    Marital Status: Not on file   Intimate Partner Violence:     Fear of Current or Ex-Partner: Not on file    Emotionally Abused: Not on file    Physically Abused: Not on file    Sexually Abused: Not on file   Housing Stability:     Unable to Pay for Housing in the Last Year: Not on file    Number of [place] in the Last Year: Not on file    Unstable Housing in the Last Year: Not on file         ALLERGIES: Metformin, Thorazine [chlorpromazine], and Prednisone    Review of Systems   Constitutional: Negative for chills and fever. Respiratory: Negative for shortness of breath. Cardiovascular: Negative for chest pain. Gastrointestinal: Negative for abdominal pain. Genitourinary: Negative for dysuria. Musculoskeletal: Negative for neck stiffness. Skin: Negative for rash. Allergic/Immunologic: Negative for immunocompromised state. Neurological: Negative for headaches. Psychiatric/Behavioral: Negative for agitation. All other systems reviewed and are negative. Vitals:    07/09/22 1149   BP: (!) 158/96   Pulse: 70   Resp: 16   Temp: 97.8 °F (36.6 °C)   SpO2: 98%            Physical Exam  Vitals and nursing note reviewed. Constitutional:       General: He is not in acute distress. Cardiovascular:      Rate and Rhythm: Normal rate and regular rhythm. Heart sounds: No murmur heard. Pulmonary:      Effort: Pulmonary effort is normal.      Breath sounds: Normal breath sounds. Abdominal:      Palpations: Abdomen is soft. Tenderness: There is no abdominal tenderness.    Musculoskeletal:         General: Normal range of motion. Cervical back: Normal range of motion. Skin:     General: Skin is warm. Comments: Paronychia to left distal third digit. No active drainage. Mild erythema noted. Neurovascular intact. Neurological:      Mental Status: He is alert and oriented to person, place, and time. Mental status is at baseline. MDM       Procedures      VITAL SIGNS:  No data found. LABS:  No results found for this or any previous visit (from the past 6 hour(s)). IMAGING:  No orders to display         Medications During Visit:  Medications - No data to display      DECISION MAKING:  Kj Pinto is a 73 y.o. male who comes in as above. Physical examinatio shows paronychia to left distal third digit. No I&D needed. Patient prescribed a 7-day course of cephalexin. Patient agreed to plan of care. Patient stable upon discharge. Return precautions given to patient. IMPRESSION:  1. Paronychia of left middle finger        DISPOSITION:  Discharged      Discharge Medication List as of 7/9/2022 12:26 PM      START taking these medications    Details   cephALEXin (Keflex) 500 mg capsule Take 1 Capsule by mouth three (3) times daily for 7 days. , Print, Disp-21 Capsule, R-0         CONTINUE these medications which have NOT CHANGED    Details   glimepiride (AMARYL) 1 mg tablet Take 1 Tablet by mouth Daily (before breakfast). Needs appointment., Normal, Disp-30 Tablet, R-0      hydroCHLOROthiazide (HYDRODIURIL) 12.5 mg tablet Take 1 Tablet by mouth daily. Needs appointment., Normal, Disp-30 Tablet, R-0      amLODIPine (NORVASC) 10 mg tablet Take 1 Tablet by mouth daily. Needs appointment., Normal, Disp-30 Tablet, R-0Patient needs to the patient care with another provider. dutasteride (AVODART) 0.5 mg capsule Take 1 Capsule by mouth daily. , Normal, Disp-90 Capsule, R-1      omega 3-DHA-EPA-fish oil (Fish OiL) 1,000 mg (120 mg-180 mg) capsule Take 1 Capsule by mouth daily. , Normal, Disp-90 Capsule, R-1      Blood-Glucose Meter monitoring kit Test once daily before breakfast, Normal, Disp-1 Kit, R-1      glucose blood VI test strips (blood glucose test) strip Test as directed in clinic. Dx: Diabetes mellitus Type 2, Normal, Disp-200 Strip, R-5      lancets misc Please use one lancet with every blood sugar reading, Normal, Disp-1 Each, R-11      aspirin 81 mg chewable tablet Take 1 Tablet by mouth daily. , Normal, Disp-30 Tablet, R-0              Follow-up Information    None           The patient is asked to follow-up with their primary care provider in the next several days. They are to call tomorrow for an appointment. The patient is asked to return promptly for any increased concerns or worsening of symptoms. They can return to this emergency department or any other emergency department.

## 2022-07-09 NOTE — Clinical Note
[address]  [address] [facility] [address]  [phone]    Work/School Note    Date: 7/9/2022    To Whom It May concern:      Moni Bennett was seen and treated today in the emergency room by the following provider(s):  Attending Provider: Edson Phelps MD  Physician Assistant: Bhakti Hdz. Moni Bennett is excused from work/school on 07/09/22. He is clear to return to work/school on 07/10/22.         Sincerely,          TULIO Rm

## 2022-07-09 NOTE — ED TRIAGE NOTES
Pt arrives c/o left middle finger pain. Pt reports \"I fan paper at work and repeat it. Its just swollen. Pus came out of it. I just wanted to check to make sure its not infected. \" pt denies hitting/injuring finger. Some swelling noted. No redness noted. Pt arrives with a splint on middle finger.

## 2022-07-19 ENCOUNTER — APPOINTMENT (OUTPATIENT)
Dept: GENERAL RADIOLOGY | Age: 67
End: 2022-07-19
Attending: STUDENT IN AN ORGANIZED HEALTH CARE EDUCATION/TRAINING PROGRAM
Payer: MEDICARE

## 2022-07-19 ENCOUNTER — HOSPITAL ENCOUNTER (EMERGENCY)
Age: 67
Discharge: HOME | End: 2022-07-19
Attending: STUDENT IN AN ORGANIZED HEALTH CARE EDUCATION/TRAINING PROGRAM
Payer: MEDICARE

## 2022-07-19 VITALS
RESPIRATION RATE: 16 BRPM | DIASTOLIC BLOOD PRESSURE: 83 MMHG | SYSTOLIC BLOOD PRESSURE: 168 MMHG | TEMPERATURE: 98.6 F | OXYGEN SATURATION: 96 % | HEART RATE: 70 BPM

## 2022-07-19 DIAGNOSIS — M19.041 ARTHRITIS OF RIGHT HAND: Primary | ICD-10-CM

## 2022-07-19 PROCEDURE — 99283 EMERGENCY DEPT VISIT LOW MDM: CPT

## 2022-07-19 PROCEDURE — 73130 X-RAY EXAM OF HAND: CPT

## 2022-07-19 NOTE — Clinical Note
[address]  [address] [facility] [address]  [phone]    Work/School Note    Date: 7/19/2022    To Whom It May concern:      Marvin Singer was seen and treated today in the emergency room by the following provider(s):  Attending Provider: Tl Rodriguez DO  Physician Assistant: TULIO Willard. Marvin Singer is excused from work/school on 07/19/22. He is clear to return to work/school on 07/20/22.         Sincerely,          TULIO Mejia

## 2022-07-19 NOTE — ED PROVIDER NOTES
73 y.o. male with history of HTN presents to ED with 1 year of right palm pain. Patient reports that this has been going on \"for a while\" and reports that he notices the pain is worse when he is relaxing after a long day at work. He notes that he does \"lots of heavy lifting\" at work. He states that although he first noticed this 1 year ago but has been worsening for past 2 months, especially today. He has an appointment with PCP next week but has not yet discussed this with him. He takes excedrine with relief of pain. No numbness, tingling, fevers, chills, trauma. The history is provided by the patient. Hand Pain          Past Medical History:   Diagnosis Date    Hypertension        Past Surgical History:   Procedure Laterality Date    HX ORTHOPAEDIC      Achilles Tendon/ left foot          Family History:   Problem Relation Age of Onset    Diabetes Mother     Diabetes Sister     Hypertension Sister     Diabetes Brother     Hypertension Brother        Social History     Socioeconomic History    Marital status:      Spouse name: Not on file    Number of children: Not on file    Years of education: Not on file    Highest education level: Not on file   Occupational History    Not on file   Tobacco Use    Smoking status: Never Smoker    Smokeless tobacco: Never Used   Vaping Use    Vaping Use: Never used   Substance and Sexual Activity    Alcohol use: No    Drug use: No    Sexual activity: Yes     Partners: Female   Other Topics Concern    Not on file   Social History Narrative    Not on file     Social Determinants of Health     Financial Resource Strain:     Difficulty of Paying Living Expenses: Not on file   Food Insecurity:     Worried About [address] in the Last Year: Not on file    Tram[place] of Food in the Last Year: Not on file   Transportation Needs:     Lack of Transportation (Medical): Not on file    Lack of Transportation (Non-Medical):  Not on file   Physical Activity:     Days of Exercise per Week: Not on file    Minutes of Exercise per Session: Not on file   Stress:     Feeling of Stress : Not on file   Social Connections:     Frequency of Communication with Friends and Family: Not on file    Frequency of Social Gatherings with Friends and Family: Not on file    Attends Sikh Services: Not on file    Active Member of [address] [place] [address] or Organizations: Not on file    Attends Club or Organization Meetings: Not on file    Marital Status: Not on file   Intimate Partner Violence:     Fear of Current or Ex-Partner: Not on file    Emotionally Abused: Not on file    Physically Abused: Not on file    Sexually Abused: Not on file   Housing Stability:     Unable to Pay for Housing in the Last Year: Not on file    Number of [place] in the Last Year: Not on file    Unstable Housing in the Last Year: Not on file         ALLERGIES: Metformin, Thorazine [chlorpromazine], and Prednisone    Review of Systems   Constitutional: Negative for fever. HENT: Negative for congestion and sinus pain. Respiratory: Negative for cough. Cardiovascular: Negative for chest pain. Gastrointestinal: Negative for nausea and vomiting. Genitourinary: Negative for dysuria. Musculoskeletal: Negative for myalgias. Neurological: Negative for headaches. Hematological: Negative for adenopathy. All other systems reviewed and are negative. Vitals:    07/19/22 1505   BP: (!) 168/83   Pulse: 70   Resp: 16   Temp: 98.6 °F (37 °C)   SpO2: 96%            Physical Exam  Vitals and nursing note reviewed. Constitutional:       Appearance: Normal appearance. Eyes:      Extraocular Movements: Extraocular movements intact. Pupils: Pupils are equal, round, and reactive to light. Cardiovascular:      Rate and Rhythm: Normal rate and regular rhythm. Heart sounds: Normal heart sounds.    Pulmonary:      Effort: Pulmonary effort is normal.      Breath sounds: Normal breath sounds. Abdominal:      Palpations: Abdomen is soft. Tenderness: There is no abdominal tenderness. Musculoskeletal:      Right hand: No swelling, deformity, tenderness or bony tenderness. Normal range of motion. Normal strength. Normal pulse. Left hand: Normal.   Lymphadenopathy:      Cervical: No cervical adenopathy. Skin:     General: Skin is warm and dry. Neurological:      General: No focal deficit present. Mental Status: He is alert and oriented to person, place, and time. Psychiatric:         Mood and Affect: Mood normal.         Behavior: Behavior normal.          MDM  Number of Diagnoses or Management Options  Arthritis of right hand  Diagnosis management comments: 73 y.o. male with history of HTN presents to ED with 1 year of right palm pain. Vital signs stable in triage. Physical exam unremarkable with no swelling, deformity, tenderness or reduced range of motion to right hand. Strength intact and normal pulse noted. Patient denied wanting anything for pain while in ED, reporting \"Excedrin works well for me at home\". 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 Patient already has follow-up appointment with PCP next week and pain is well controlled. Patient will be discharged with instructions for conservative care, follow-up and return precautions.        Amount and/or Complexity of Data Reviewed  Tests in the radiology section of CPT®: reviewed           Procedures

## 2022-07-27 ENCOUNTER — OFFICE VISIT (OUTPATIENT)
Dept: PRIMARY CARE CLINIC | Age: 67
End: 2022-07-27
Payer: MEDICARE

## 2022-07-27 VITALS
HEIGHT: 69 IN | HEART RATE: 65 BPM | RESPIRATION RATE: 16 BRPM | BODY MASS INDEX: 33.36 KG/M2 | WEIGHT: 225.2 LBS | TEMPERATURE: 97.1 F | DIASTOLIC BLOOD PRESSURE: 69 MMHG | SYSTOLIC BLOOD PRESSURE: 150 MMHG | OXYGEN SATURATION: 99 %

## 2022-07-27 DIAGNOSIS — M19.041 ARTHRITIS OF RIGHT HAND: ICD-10-CM

## 2022-07-27 DIAGNOSIS — E11.9 TYPE 2 DIABETES MELLITUS WITHOUT COMPLICATION, WITHOUT LONG-TERM CURRENT USE OF INSULIN (HCC): ICD-10-CM

## 2022-07-27 DIAGNOSIS — I43 CARDIOMYOPATHY DUE TO HYPERTENSION, WITHOUT HEART FAILURE (HCC): ICD-10-CM

## 2022-07-27 DIAGNOSIS — Z79.899 MEDICATION MANAGEMENT: ICD-10-CM

## 2022-07-27 DIAGNOSIS — I10 PRIMARY HYPERTENSION: Primary | ICD-10-CM

## 2022-07-27 DIAGNOSIS — L03.012 PARONYCHIA OF LEFT MIDDLE FINGER: ICD-10-CM

## 2022-07-27 DIAGNOSIS — I71.9 AORTIC ANEURYSM WITHOUT RUPTURE, UNSPECIFIED PORTION OF AORTA (HCC): ICD-10-CM

## 2022-07-27 DIAGNOSIS — I11.9 CARDIOMYOPATHY DUE TO HYPERTENSION, WITHOUT HEART FAILURE (HCC): ICD-10-CM

## 2022-07-27 DIAGNOSIS — N40.0 BENIGN PROSTATIC HYPERPLASIA WITHOUT LOWER URINARY TRACT SYMPTOMS: ICD-10-CM

## 2022-07-27 LAB — HBA1C MFR BLD HPLC: 6.2 %

## 2022-07-27 PROCEDURE — 1123F ACP DISCUSS/DSCN MKR DOCD: CPT | Performed by: NURSE PRACTITIONER

## 2022-07-27 PROCEDURE — 99214 OFFICE O/P EST MOD 30 MIN: CPT | Performed by: NURSE PRACTITIONER

## 2022-07-27 PROCEDURE — 83036 HEMOGLOBIN GLYCOSYLATED A1C: CPT | Performed by: NURSE PRACTITIONER

## 2022-07-27 PROCEDURE — 3044F HG A1C LEVEL LT 7.0%: CPT | Performed by: NURSE PRACTITIONER

## 2022-07-27 RX ORDER — BENAZEPRIL HYDROCHLORIDE AND HYDROCHLOROTHIAZIDE 10; 12.5 MG/1; MG/1
1 TABLET ORAL DAILY
Qty: 90 TABLET | Refills: 0 | Status: SHIPPED | OUTPATIENT
Start: 2022-07-27 | End: 2022-11-02

## 2022-07-27 RX ORDER — GLIMEPIRIDE 1 MG/1
1 TABLET ORAL
Qty: 90 TABLET | Refills: 0 | Status: SHIPPED | OUTPATIENT
Start: 2022-07-27

## 2022-07-27 RX ORDER — DUTASTERIDE 0.5 MG/1
0.5 CAPSULE, LIQUID FILLED ORAL DAILY
Qty: 90 CAPSULE | Refills: 0 | Status: SHIPPED | OUTPATIENT
Start: 2022-07-27

## 2022-07-27 RX ORDER — AMLODIPINE BESYLATE 10 MG/1
10 TABLET ORAL DAILY
Qty: 90 TABLET | Refills: 0 | Status: SHIPPED | OUTPATIENT
Start: 2022-07-27 | End: 2022-11-01

## 2022-07-27 NOTE — LETTER
NOTIFICATION RETURN TO WORK / SCHOOL    7/27/2022 8:50 AM    Mr. Bib Alicea  [facility] [id number]      To Whom It May Concern:    Bib Alicea is currently under the care of Celia Kaba. He will return to work/school on: 07/28    If there are questions or concerns please have the patient contact our office.         Sincerely,      Angeles Estevez NP
[id number]

## 2022-07-27 NOTE — PROGRESS NOTES
[facility]   Fang Osorio 66., [address]  P: [phone]  F: [phone]    SUBJECTIVE     HPI:     Elpidio Santos is a 73 y.o. male who is seen in the clinic for   Chief Complaint   Patient presents with    Follow-up     diabetic      The patient presents today for routine management of his co-morbidities. Since his last appointment with me on 06/27, he has been seen twice in the ED. On his first visit to the ED, he was dx with left middle finger paronychia. He was given Keflex. However, he did not take it and his symptoms resolved over time. One his second visit to the ED, he was dx with right hand OA that was confirmed via X-ray. No pain meds were provided at that time and his pain has since subsided. At his last appointment with me on 06/27, a referral to Cardiology was placed due to his PMH of Uncontrolled HTN/Cardiomyopathy/Aortic Aneurysm. He saw Dr. Aileen Branch on 07/08 and no changes were made to his medication regimen. BP at home has been between 130-140s/70-80s. BP in office today is 150/69. Currently on HCTZ and Amlodipine. Patient has not yet made an appointment with Diabetes Educator to manage his DM II. He states that he \"will once he retires in [facility]". HA1C today is 6.2. Currently on Amaryl 1 mg once daily. Requesting refill of his Avodart for BPH. Patient Active Problem List    Diagnosis    HTN (hypertension)    Elevated troponin I level    New onset type 2 diabetes mellitus (HCC)    Benign prostatic hyperplasia with nocturia    Essential hypertension    Obesity (BMI 30.0-34. 9)    Arthritis of lumbar spine    Primary osteoarthritis of right knee        Past Medical History:   Diagnosis Date    Hypertension      Past Surgical History:   Procedure Laterality Date    HX ORTHOPAEDIC      Achilles Tendon/ left foot      Social History     Socioeconomic History    Marital status:      Spouse name: Not on file    Number of children: Not on file    Years of education: Not on file    Highest education level: Not on file   Occupational History    Not on file   Tobacco Use    Smoking status: Never    Smokeless tobacco: Never   Vaping Use    Vaping Use: Never used   Substance and Sexual Activity    Alcohol use: No    Drug use: No    Sexual activity: Yes     Partners: Female   Other Topics Concern    Not on file   Social History Narrative    Not on file     Social Determinants of Health     Financial Resource Strain: Not on file   Food Insecurity: Not on file   Transportation Needs: Not on file   Physical Activity: Not on file   Stress: Not on file   Social Connections: Not on file   Intimate Partner Violence: Not on file   Housing Stability: Not on file     Family History   Problem Relation Age of Onset    Diabetes Mother     Diabetes Sister     Hypertension Sister     Diabetes Brother     Hypertension Brother      Immunization History   Administered Date(s) Administered    COVID-19, PFIZER PURPLE top, DILUTE for use, (age 15 y+), IM, 30mcg/0.3mL 04/14/2021, 05/05/2021    Influenza Vaccine (Quad) PF (>6 Mo Flulaval, Fluarix, and >3 Yrs Afluria, Fluzone 28802) 02/06/2018    Pneumococcal Polysaccharide (PPSV-23) 12/01/2021      Allergies   Allergen Reactions    Metformin Diarrhea    Thorazine [Chlorpromazine] Shortness of Breath    Prednisone Other (comments)     Hiccups         Orders Only on 06/27/2022   Component Date Value Ref Range Status    Microalbumin,urine random 06/27/2022 1.02  MG/DL Final    No reference range has been established. Creatinine, urine 06/27/2022 209.00  mg/dL Final    No reference range has been established.     Microalbumin/Creat ratio (mg/g cre* 06/27/2022 5  0 - 30 mg/g Final   Office Visit on 06/27/2022   Component Date Value Ref Range Status    Glucose POC 06/27/2022 106  MG/DL Final   Orders Only on 05/19/2022   Component Date Value Ref Range Status    Hep C virus Ab Interp. 05/19/2022 NONREACTIVE  NONREACTIVE   Final    Method used is Siemens [employer] Automation    Cholesterol, total 05/19/2022 188  <200 MG/DL Final    Triglyceride 05/19/2022 104  <150 MG/DL Final    Based on NCEP-ATP III:  Triglycerides <150 mg/dL  is considered normal, 150-199 mg/dL  borderline high,  200-499 mg/dL high and  greater than or equal to 500 mg/dL very high. HDL Cholesterol 05/19/2022 57  MG/DL Final    Based on NCEP ATP III, HDL Cholesterol <40 mg/dL is considered low and >60 mg/dL is elevated. LDL, calculated 05/19/2022 110.2 (A) 0 - 100 MG/DL Final    Comment: Based on the NCEP-ATP: LDL-C concentrations are considered  optimal <100 mg/dL,  near optimal/above Normal 100-129 mg/dL  Borderline High: 130-159, High: 160-189 mg/dL  Very High: Greater than or equal to 190 mg/dL      VLDL, calculated 05/19/2022 20.8  MG/DL Final    CHOL/HDL Ratio 05/19/2022 3.3  0.0 - 5.0   Final    Sodium 05/19/2022 140  136 - 145 mmol/L Final    Potassium 05/19/2022 4.6  3.5 - 5.1 mmol/L Final    Chloride 05/19/2022 106  97 - 108 mmol/L Final    CO2 05/19/2022 29  21 - 32 mmol/L Final    Anion gap 05/19/2022 5  5 - 15 mmol/L Final    Glucose 05/19/2022 105 (A) 65 - 100 mg/dL Final    BUN 05/19/2022 17  6 - 20 MG/DL Final    Creatinine 05/19/2022 1.13  0.70 - 1.30 MG/DL Final    BUN/Creatinine ratio 05/19/2022 15  12 - 20   Final    GFR est AA 05/19/2022 >60  >60 ml/min/1.73m2 Final    GFR est non-AA 05/19/2022 >60  >60 ml/min/1.73m2 Final    Estimated GFR is calculated using the IDMS-traceable Modification of Diet in Renal Disease (MDRD) Study equation, reported for both  Americans (GFRAA) and non- Americans (GFRNA), and normalized to 1.73m2 body surface area. The physician must decide which value applies to the patient. Calcium 05/19/2022 9.8  8.5 - 10.1 MG/DL Final    Bilirubin, total 05/19/2022 0.4  0.2 - 1.0 MG/DL Final    ALT (SGPT) 05/19/2022 35  12 - 78 U/L Final    AST (SGOT) 05/19/2022 27  15 - 37 U/L Final    Alk.  phosphatase 05/19/2022 66  45 - 117 U/L Final Protein, total 05/19/2022 8.6 (A) 6.4 - 8.2 g/dL Final    Albumin 05/19/2022 3.9  3.5 - 5.0 g/dL Final    Globulin 05/19/2022 4.7 (A) 2.0 - 4.0 g/dL Final    A-G Ratio 05/19/2022 0.8 (A) 1.1 - 2.2   Final   Office Visit on 05/19/2022   Component Date Value Ref Range Status    Glucose POC 05/19/2022 102  MG/DL Final   Admission on 05/08/2022, Discharged on 05/10/2022   Component Date Value Ref Range Status    WBC 05/08/2022 5.5  4.1 - 11.1 K/uL Final    RBC 05/08/2022 4.94  4.10 - 5.70 M/uL Final    HGB 05/08/2022 15.5  12.1 - 17.0 g/dL Final    HCT 05/08/2022 45.2  36.6 - 50.3 % Final    MCV 05/08/2022 91.5  80.0 - 99.0 FL Final    MCH 05/08/2022 31.4  26.0 - 34.0 PG Final    MCHC 05/08/2022 34.3  30.0 - 36.5 g/dL Final    RDW 05/08/2022 14.1  11.5 - 14.5 % Final    PLATELET 37/78/3728 440  150 - 400 K/uL Final    MPV 05/08/2022 10.9  8.9 - 12.9 FL Final    NRBC 05/08/2022 0.0  0  WBC Final    ABSOLUTE NRBC 05/08/2022 0.00  0.00 - 0.01 K/uL Final    NEUTROPHILS 05/08/2022 38  32 - 75 % Final    LYMPHOCYTES 05/08/2022 48  12 - 49 % Final    MONOCYTES 05/08/2022 6  5 - 13 % Final    EOSINOPHILS 05/08/2022 7  0 - 7 % Final    BASOPHILS 05/08/2022 1  0 - 1 % Final    IMMATURE GRANULOCYTES 05/08/2022 0  0.0 - 0.5 % Final    ABS. NEUTROPHILS 05/08/2022 2.1  1.8 - 8.0 K/UL Final    ABS. LYMPHOCYTES 05/08/2022 2.6  0.8 - 3.5 K/UL Final    ABS. MONOCYTES 05/08/2022 0.3  0.0 - 1.0 K/UL Final    ABS. EOSINOPHILS 05/08/2022 0.4  0.0 - 0.4 K/UL Final    ABS. BASOPHILS 05/08/2022 0.0  0.0 - 0.1 K/UL Final    ABS. IMM.  GRANS. 05/08/2022 0.0  0.00 - 0.04 K/UL Final    DF 05/08/2022 AUTOMATED    Final    Sodium 05/08/2022 138  136 - 145 mmol/L Final    Potassium 05/08/2022 3.9  3.5 - 5.1 mmol/L Final    Chloride 05/08/2022 107  97 - 108 mmol/L Final    CO2 05/08/2022 27  21 - 32 mmol/L Final    Anion gap 05/08/2022 4 (A) 5 - 15 mmol/L Final    Glucose 05/08/2022 109 (A) 65 - 100 mg/dL Final    BUN 05/08/2022 14  6 - 20 MG/DL Final    Creatinine 05/08/2022 1.07  0.70 - 1.30 MG/DL Final    BUN/Creatinine ratio 05/08/2022 13  12 - 20   Final    GFR est AA 05/08/2022 >60  >60 ml/min/1.73m2 Final    GFR est non-AA 05/08/2022 >60  >60 ml/min/1.73m2 Final    Estimated GFR is calculated using the IDMS-traceable Modification of Diet in Renal Disease (MDRD) Study equation, reported for both  Americans (GFRAA) and non- Americans (GFRNA), and normalized to 1.73m2 body surface area. The physician must decide which value applies to the patient. Calcium 05/08/2022 8.9  8.5 - 10.1 MG/DL Final    Bilirubin, total 05/08/2022 0.4  0.2 - 1.0 MG/DL Final    ALT (SGPT) 05/08/2022 40  12 - 78 U/L Final    AST (SGOT) 05/08/2022 25  15 - 37 U/L Final    Alk. phosphatase 05/08/2022 71  45 - 117 U/L Final    Protein, total 05/08/2022 8.8 (A) 6.4 - 8.2 g/dL Final    Albumin 05/08/2022 3.8  3.5 - 5.0 g/dL Final    Globulin 05/08/2022 5.0 (A) 2.0 - 4.0 g/dL Final    A-G Ratio 05/08/2022 0.8 (A) 1.1 - 2.2   Final    Troponin-High Sensitivity 05/08/2022 126 (A) 0 - 76 ng/L Final    Comment: Up to 50% of normal patients may have low levels of detectable troponin (within reference range) circulating in the blood. Mild elevations in troponin that are above the reference range, may be present with other cardiac and non-cardiac disease states. Two or three serial tests at two hour intervals, are recommended to evaluate changes in circulating troponin over time. CALLED TO AND READ BACK BY  FANY PEREIRA AT 1008.  TJS      Ventricular Rate 05/08/2022 67  BPM Final    Atrial Rate 05/08/2022 67  BPM Final    P-R Interval 05/08/2022 164  ms Final    QRS Duration 05/08/2022 88  ms Final    Q-T Interval 05/08/2022 394  ms Final    QTC Calculation (Bezet) 05/08/2022 416  ms Final    Calculated P Axis 05/08/2022 58  degrees Final    Calculated R Axis 05/08/2022 -51  degrees Final    Calculated T Axis 05/08/2022 9  degrees Final    Diagnosis 05/08/2022    Final Value:Normal sinus rhythm  Nonspecific ST abnormality    No previous ECGs available  Confirmed by Nelli Oneil M.D., Charley Faria ([id number]) on 5/8/2022 2:44:27 PM      SAMPLES BEING HELD 05/08/2022 1 RED   Final    COMMENT 05/08/2022 Add-on orders for these samples will be processed based on acceptable specimen integrity and analyte stability, which may vary by analyte. Final    Troponin-High Sensitivity 05/08/2022 128 (A) 0 - 76 ng/L Final    Comment: Up to 50% of normal patients may have low levels of detectable troponin (within reference range) circulating in the blood. Mild elevations in troponin that are above the reference range, may be present with other cardiac and non-cardiac disease states. Two or three serial tests at two hour intervals, are recommended to evaluate changes in circulating troponin over time. CALLED TO AND READ BACK BY  KI SOARES [address] [facility]      Troponin-High Sensitivity 05/08/2022 121 (A) 0 - 76 ng/L Final    Comment: Up to 50% of normal patients may have low levels of detectable troponin (within reference range) circulating in the blood. Mild elevations in troponin that are above the reference range, may be present with other cardiac and non-cardiac disease states. Two or three serial tests at two hour intervals, are recommended to evaluate changes in circulating troponin over time.   CALLED TO AND READ BACK BY  REGGIE [facility] RN @1852/VXD      Ventricular Rate 05/08/2022 61  BPM Final    Atrial Rate 05/08/2022 61  BPM Final    P-R Interval 05/08/2022 180  ms Final    QRS Duration 05/08/2022 90  ms Final    Q-T Interval 05/08/2022 402  ms Final    QTC Calculation (Bezet) 05/08/2022 404  ms Final    Calculated P Axis 05/08/2022 46  degrees Final    Calculated R Axis 05/08/2022 -49  degrees Final    Calculated T Axis 05/08/2022 -3  degrees Final    Diagnosis 05/08/2022    Final                    Value:Normal sinus rhythm  Left anterior fascicular block  When compared with ECG of 08-MAY-2022 08:45,  No significant change was found  Confirmed by Mike Batres M.D., Christine Hanley ([id number]) on 5/9/2022 4:03:17 PM      Cholesterol, total 05/09/2022 166  <200 MG/DL Final    Triglyceride 05/09/2022 91  <150 MG/DL Final    Based on NCEP-ATP III:  Triglycerides <150 mg/dL  is considered normal, 150-199 mg/dL  borderline high,  200-499 mg/dL high and  greater than or equal to 500 mg/dL very high. HDL Cholesterol 05/09/2022 45  MG/DL Final    Based on NCEP ATP III, HDL Cholesterol <40 mg/dL is considered low and >60 mg/dL is elevated. LDL, calculated 05/09/2022 102.8 (A) 0 - 100 MG/DL Final    Comment: Based on the NCEP-ATP: LDL-C concentrations are considered  optimal <100 mg/dL,  near optimal/above Normal 100-129 mg/dL  Borderline High: 130-159, High: 160-189 mg/dL  Very High: Greater than or equal to 190 mg/dL      VLDL, calculated 05/09/2022 18.2  MG/DL Final    CHOL/HDL Ratio 05/09/2022 3.7  0.0 - 5.0   Final    Magnesium 05/09/2022 2.3  1.6 - 2.4 mg/dL Final    SPECIMEN HEMOLYZED, RESULTS MAY BE AFFECTED    Sodium 05/09/2022 136  136 - 145 mmol/L Final    Potassium 05/09/2022 4.6  3.5 - 5.1 mmol/L Final    SPECIMEN HEMOLYZED, RESULTS MAY BE AFFECTED    Chloride 05/09/2022 107  97 - 108 mmol/L Final    CO2 05/09/2022 26  21 - 32 mmol/L Final    Anion gap 05/09/2022 3 (A) 5 - 15 mmol/L Final    Glucose 05/09/2022 112 (A) 65 - 100 mg/dL Final    BUN 05/09/2022 16  6 - 20 MG/DL Final    Creatinine 05/09/2022 1.13  0.70 - 1.30 MG/DL Final    BUN/Creatinine ratio 05/09/2022 14  12 - 20   Final    GFR est AA 05/09/2022 >60  >60 ml/min/1.73m2 Final    GFR est non-AA 05/09/2022 >60  >60 ml/min/1.73m2 Final    Calcium 05/09/2022 8.5  8.5 - 10.1 MG/DL Final    Troponin-High Sensitivity 05/09/2022 128 (A) 0 - 76 ng/L Final    Comment: Up to 50% of normal patients may have low levels of detectable troponin (within reference range) circulating in the blood.  Mild elevations in troponin that are above the reference range, may be present with other cardiac and non-cardiac disease states. Two or three serial tests at two hour intervals, are recommended to evaluate changes in circulating troponin over time.   CALLED TO AND READ BACK BY  THEE SOARES 1110 [facility]      Hemoglobin A1c 05/09/2022 6.4 (A) 4.0 - 5.6 % Final    Comment: NEW METHOD  PLEASE NOTE NEW REFERENCE RANGE  (NOTE)  HbA1C Interpretive Ranges  <5.7              Normal  5.7 - 6.4         Consider Prediabetes  >6.5              Consider Diabetes      Est. average glucose 05/09/2022 137  mg/dL Final    Stress Target HR 05/09/2022 155  bpm Final    Baseline Systolic BP 49/70/2190 812  mmHg Final    Baseline Diastolic BP 37/93/1511 88  mmHg Final    Stress Stage 1 Duration 05/09/2022 1  min:sec Final    Stress Stage 1 HR 05/09/2022 73  bpm Final    Stress Stage 2 Duration 05/09/2022 1  min:sec Final    Stress Stage 2 HR 05/09/2022 96  bpm Final    Stress Stage 2 BP 05/09/2022 166/81  mmHg Final    Stress Stage 3 Duration 05/09/2022 1  min:sec Final    Stress Stage 3 HR 05/09/2022 87  bpm Final    Stress Stage 4 Duration 05/09/2022 1  min:sec Final    Recovery Stage 1 Duration 05/09/2022 1  min:sec Final    Recovery Stage 1 HR 05/09/2022 83  bpm Final    Recovery Stage 1 BP 05/09/2022 190/83  mmHg Final    Recovery Stage 2 Duration 05/09/2022 1  min:sec Final    Recovery Stage 2 HR 05/09/2022 82  bpm Final    Recovery Stage 3 Duration 05/09/2022 1  min:sec Final    Recovery Stage 3 HR 05/09/2022 78  bpm Final    Recovery Stage 3 BP 05/09/2022 170/86  mmHg Final    Exercise Duration Time 05/09/2022 3  min Final    Exercuse Duration Seconds 05/09/2022 0  sec Final    Stress Systolic BP 05/10/0906 741  mmHg Final    Stress Diastolic BP 02/56/9996 83  mmHg Final    Stress Peak HR 05/09/2022 96  BPM Final    Baseline HR 05/09/2022 64  BPM Final    Stress Estimated Workload 05/09/2022 1.0  METS Final    Stress Rate Pressure Product 05/09/2022 18,240  BPM*mmHg Final    Stress Percent HR Achieved 05/09/2022 62  % Final    Baseline ST Depression 05/09/2022 0  mm Final    IVSd 05/10/2022 1.1 (A) 0.6 - 1.0 cm Final    LVIDd 05/10/2022 4.2  4.2 - 5.9 cm Final    LVIDs 05/10/2022 3.2  cm Final    LVPWd 05/10/2022 1.6 (A) 0.6 - 1.0 cm Final    LVOT Peak Gradient 05/10/2022 3  mmHg Final    LVOT Peak Velocity 05/10/2022 0.8  m/s Final    LA Diameter 05/10/2022 3.0  cm Final    AV Peak Gradient 05/10/2022 8  mmHg Final    AV Peak Velocity 05/10/2022 1.4  m/s Final    MV A Velocity 05/10/2022 0.78  m/s Final    MV E Wave Deceleration Time 05/10/2022 314.5  ms Final    MV E Velocity 05/10/2022 0.51  m/s Final    LV E' Lateral Velocity 05/10/2022 6  cm/s Final    LV E' Septal Velocity 05/10/2022 8  cm/s Final    MV PHT 05/10/2022 91.2  ms Final    MV Area by PHT 05/10/2022 2.4  cm2 Final    PV Peak Gradient 05/10/2022 5  mmHg Final    PV Max Velocity 05/10/2022 1.1  m/s Final    TAPSE 05/10/2022 2.0  1.7 cm Final    TR Peak Gradient 05/10/2022 20  mmHg Final    TR Max Velocity 05/10/2022 2.26  m/s Final    Aortic Root 05/10/2022 3.9  cm Final    Fractional Shortening 2D 05/10/2022 24  28 - 44 % Final    LVIDd Index 05/10/2022 1.91  cm/m2 Final    LVIDs Index 05/10/2022 1.45  cm/m2 Final    LV RWT Ratio 05/10/2022 0.76   Final    LV Mass 2D 05/10/2022 212.3  88 - 224 g Final    LV Mass 2D Index 05/10/2022 96.5  49 - 115 g/m2 Final    MV E/A 05/10/2022 0.65   Final    E/E' Ratio (Averaged) 05/10/2022 7.44   Final    E/E' Lateral 05/10/2022 8.50   Final    E/E' Septal 05/10/2022 6.38   Final    LA Size Index 05/10/2022 1.36  cm/m2 Final    LA/AO Root Ratio 05/10/2022 0.77   Final    Ao Root Index 05/10/2022 1.77  cm/m2 Final    AV Velocity Ratio 05/10/2022 0.57   Final    Glucose (POC) 05/09/2022 78  65 - 117 mg/dL Final    Comment: (NOTE)  The FDA has indicated that no capillary point of care blood glucose  monitoring systems are approved for use in \"critically ill\" patients,  however they have not defined this population. The College of  American Pathologists has recommended that these devices should not  be used in cases such as severe hypotension, dehydration, shock, and  hyperglycemic-hyperosmolar state, amongst others. Venous or arterial  collection is the recommended specimen for testing these patients. Performed by 05/09/2022 Daron Marcos   Final    Glucose (POC) 05/09/2022 108  65 - 117 mg/dL Final    Comment: (NOTE)  The FDA has indicated that no capillary point of care blood glucose  monitoring systems are approved for use in \"critically ill\" patients,  however they have not defined this population. The College of  American Pathologists has recommended that these devices should not  be used in cases such as severe hypotension, dehydration, shock, and  hyperglycemic-hyperosmolar state, amongst others. Venous or arterial  collection is the recommended specimen for testing these patients. Performed by 05/09/2022 Tera    Final    WBC 05/10/2022 6.0  4.1 - 11.1 K/uL Final    RBC 05/10/2022 5.05  4. 10 - 5.70 M/uL Final    HGB 05/10/2022 15.9  12.1 - 17.0 g/dL Final    HCT 05/10/2022 46.7  36.6 - 50.3 % Final    MCV 05/10/2022 92.5  80.0 - 99.0 FL Final    MCH 05/10/2022 31.5  26.0 - 34.0 PG Final    MCHC 05/10/2022 34.0  30.0 - 36.5 g/dL Final    RDW 05/10/2022 14.2  11.5 - 14.5 % Final    PLATELET 33/22/0922 836  150 - 400 K/uL Final    MPV 05/10/2022 10.9  8.9 - 12.9 FL Final    NRBC 05/10/2022 0.0  0  WBC Final    ABSOLUTE NRBC 05/10/2022 0.00  0.00 - 0.01 K/uL Final    Sodium 05/10/2022 137  136 - 145 mmol/L Final    Potassium 05/10/2022 4.1  3.5 - 5.1 mmol/L Final    Chloride 05/10/2022 108  97 - 108 mmol/L Final    CO2 05/10/2022 25  21 - 32 mmol/L Final    Anion gap 05/10/2022 4 (A) 5 - 15 mmol/L Final    Glucose 05/10/2022 119 (A) 65 - 100 mg/dL Final    BUN 05/10/2022 20  6 - 20 MG/DL Final    Creatinine 05/10/2022 1.21  0.70 - 1.30 MG/DL Final    BUN/Creatinine ratio 05/10/2022 17  12 - 20   Final    GFR est AA 05/10/2022 >60  >60 ml/min/1.73m2 Final    GFR est non-AA 05/10/2022 >60  >60 ml/min/1.73m2 Final    Calcium 05/10/2022 8.2 (A) 8.5 - 10.1 MG/DL Final    Bilirubin, total 05/10/2022 0.4  0.2 - 1.0 MG/DL Final    ALT (SGPT) 05/10/2022 32  12 - 78 U/L Final    AST (SGOT) 05/10/2022 19  15 - 37 U/L Final    Alk. phosphatase 05/10/2022 63  45 - 117 U/L Final    Protein, total 05/10/2022 8.0  6.4 - 8.2 g/dL Final    Albumin 05/10/2022 3.3 (A) 3.5 - 5.0 g/dL Final    Globulin 05/10/2022 4.7 (A) 2.0 - 4.0 g/dL Final    A-G Ratio 05/10/2022 0.7 (A) 1.1 - 2.2   Final    Magnesium 05/10/2022 2.3  1.6 - 2.4 mg/dL Final    Glucose (POC) 05/10/2022 114  65 - 117 mg/dL Final    Comment: (NOTE)  The FDA has indicated that no capillary point of care blood glucose  monitoring systems are approved for use in \"critically ill\" patients,  however they have not defined this population. The College of  American Pathologists has recommended that these devices should not  be used in cases such as severe hypotension, dehydration, shock, and  hyperglycemic-hyperosmolar state, amongst others. Venous or arterial  collection is the recommended specimen for testing these patients. Performed by 05/10/2022 Dairl Adas   Final      XR HAND RT MIN 3 V  Narrative: EXAM: XR HAND RT MIN 3 V    INDICATION: right hand pain. COMPARISON: None. FINDINGS: Three views of the right hand demonstrate no fracture or other acute  osseous or articular abnormality. The soft tissues are within normal limits. Severe first Aia 16 joint osteoarthritis. Tiny erosions at the second MCP joint. Central erosions are in the third and fifth DIP joints. No chondrocalcinosis or  periosteal reaction. Normal bone mineralization. Impression: Combination of osteoarthritis and erosive osteoarthritis.      Current Outpatient Medications   Medication Sig Dispense Refill    benazepril-hydroCHLOROthiazide (LOTENSIN HCT) 10-12.5 mg per tablet Take 1 Tablet by mouth in the morning. 90 Tablet 0    amLODIPine (NORVASC) 10 mg tablet Take 1 Tablet by mouth in the morning. Needs appointment. 90 Tablet 0    glimepiride (AMARYL) 1 mg tablet Take 1 Tablet by mouth Daily (before breakfast). 90 Tablet 0    dutasteride (AVODART) 0.5 mg capsule Take 1 Capsule by mouth in the morning. 90 Capsule 0    omega 3-DHA-EPA-fish oil (Fish OiL) 1,000 mg (120 mg-180 mg) capsule Take 1 Capsule by mouth daily. 90 Capsule 1    Blood-Glucose Meter monitoring kit Test once daily before breakfast 1 Kit 1    glucose blood VI test strips (blood glucose test) strip Test as directed in clinic. Dx: Diabetes mellitus Type 2 200 Strip 5    lancets misc Please use one lancet with every blood sugar reading 1 Each 11    aspirin 81 mg chewable tablet Take 1 Tablet by mouth daily. 30 Tablet 0           The past medical history, past surgical history, and family history were reviewed and updated in the medical record. Lab values/Imaging were reviewed. The medications were reviewed and updated in the medical record. Immunizations were reviewed and updated in the medical record. All relevant preventative screenings reviewed and updated in the medical record. REVIEW OF SYSTEMS   Review of Systems   Constitutional:  Negative for chills, diaphoresis, fever and malaise/fatigue. Eyes:  Negative for blurred vision. Respiratory:  Negative for cough, shortness of breath and wheezing. Cardiovascular:  Negative for chest pain, palpitations, orthopnea, leg swelling and PND. Genitourinary:  Negative for frequency and urgency. Musculoskeletal:  Negative for joint pain.        PHYSICAL EXAM   BP (!) 150/69 (BP 1 Location: Left upper arm, BP Patient Position: Sitting, BP Cuff Size: Adult long)   Pulse 65   Temp 97.1 °F (36.2 °C) (Temporal)   Resp 16   Ht 5' 9\" (1.753 m)   Wt 225 lb 3.2 oz (102.2 kg)   SpO2 99%   BMI 33.26 kg/m²      Physical Exam  Constitutional:       General: He is not in acute distress. Appearance: He is not ill-appearing or diaphoretic. Cardiovascular:      Rate and Rhythm: Normal rate and regular rhythm. Pulses: Normal pulses. Heart sounds: Normal heart sounds. No murmur heard. Pulmonary:      Effort: Pulmonary effort is normal. No respiratory distress. Breath sounds: Normal breath sounds. No wheezing. Chest:      Chest wall: No tenderness. Musculoskeletal:         General: No swelling, tenderness or deformity. Skin:     Findings: No erythema or lesion. Neurological:      General: No focal deficit present. Mental Status: He is alert. Cranial Nerves: No cranial nerve deficit. ASSESSMENT/ PLAN   Below is the assessment and plan developed based on review of pertinent history, physical exam, labs, studies, and medications. 1. Primary hypertension  Advised patient to take Lotensin HCT in AM and Amlodipine in PM. Continue to take BP once daily. -     benazepril-hydroCHLOROthiazide (LOTENSIN HCT) 10-12.5 mg per tablet; Take 1 Tablet by mouth in the morning., Normal, Disp-90 Tablet, R-0  -     amLODIPine (NORVASC) 10 mg tablet; Take 1 Tablet by mouth in the morning. Needs appointment., Normal, Disp-90 Tablet, R-0Patient needs to the patient care with another provider.  -     METABOLIC PANEL, COMPREHENSIVE; Future  2. Aortic aneurysm without rupture, unspecified portion of aorta (HCC)  -     benazepril-hydroCHLOROthiazide (LOTENSIN HCT) 10-12.5 mg per tablet; Take 1 Tablet by mouth in the morning., Normal, Disp-90 Tablet, R-0  -     amLODIPine (NORVASC) 10 mg tablet; Take 1 Tablet by mouth in the morning. Needs appointment., Normal, Disp-90 Tablet, R-0Patient needs to the patient care with another provider.  -     LIPID PANEL; Future  3. Cardiomyopathy due to hypertension, without heart failure (HCC)  -     benazepril-hydroCHLOROthiazide (LOTENSIN HCT) 10-12.5 mg per tablet;  Take 1 Tablet by mouth in the morning., Normal, Disp-90 Tablet, R-0  -     amLODIPine (NORVASC) 10 mg tablet; Take 1 Tablet by mouth in the morning. Needs appointment., Normal, Disp-90 Tablet, R-0Patient needs to the patient care with another provider. 4. Type 2 diabetes mellitus without complication, without long-term current use of insulin ([facility] [address]  Continue to take fasting glucose at home. No changes to treatment regimen made. -     METABOLIC PANEL, COMPREHENSIVE; Future  -     AMB POC HEMOGLOBIN A1C  -     glimepiride (AMARYL) 1 mg tablet; Take 1 Tablet by mouth Daily (before breakfast). , Normal, Disp-90 Tablet, R-0  5. Paronychia of left middle finger  Has resolved without intervention.   -     CBC WITH AUTOMATED DIFF; Future  6. Arthritis of right hand  Continue w/ OTC Tylenol for intermittent pain. 7. Medication management  -     METABOLIC PANEL, COMPREHENSIVE; Future  -     CBC WITH AUTOMATED DIFF; Future  -     LIPID PANEL; Future  8. Benign prostatic hyperplasia without lower urinary tract symptoms  -     dutasteride (AVODART) 0.5 mg capsule; Take 1 Capsule by mouth in the morning., Normal, Disp-90 Capsule, R-0           Follow-up and Dispositions    Return in about 4 weeks (around 8/24/2022) for Re-assess BP w/ addition of Benzapril. Disclaimer:  Advised patient to call back or return to office if symptoms worsen/change/persist.  Discussed expected course/resolution/complications of diagnosis in detail with patient. Medication risks/benefits/alternatives discussed with patient. Patient was given an after visit summary which includes diagnoses, current medications, & vitals. Discussed patient instructions and advised to read to all patient instructions regarding care. Patient expressed understanding with the diagnosis and plan.        Robin Zayas NP  7/27/2022

## 2022-10-21 ENCOUNTER — OFFICE VISIT (OUTPATIENT)
Dept: PRIMARY CARE CLINIC | Age: 67
End: 2022-10-21
Payer: MEDICARE

## 2022-10-21 VITALS
RESPIRATION RATE: 16 BRPM | WEIGHT: 233 LBS | HEIGHT: 69 IN | DIASTOLIC BLOOD PRESSURE: 80 MMHG | TEMPERATURE: 98.7 F | HEART RATE: 60 BPM | SYSTOLIC BLOOD PRESSURE: 140 MMHG | BODY MASS INDEX: 34.51 KG/M2

## 2022-10-21 DIAGNOSIS — N40.0 BENIGN PROSTATIC HYPERPLASIA WITHOUT LOWER URINARY TRACT SYMPTOMS: ICD-10-CM

## 2022-10-21 DIAGNOSIS — I51.7 LEFT VENTRICULAR HYPERTROPHY: ICD-10-CM

## 2022-10-21 DIAGNOSIS — I10 PRIMARY HYPERTENSION: Primary | ICD-10-CM

## 2022-10-21 DIAGNOSIS — E78.2 MIXED HYPERLIPIDEMIA: ICD-10-CM

## 2022-10-21 DIAGNOSIS — I10 WHITE COAT SYNDROME WITH DIAGNOSIS OF HYPERTENSION: ICD-10-CM

## 2022-10-21 DIAGNOSIS — E11.9 TYPE 2 DIABETES MELLITUS WITHOUT COMPLICATION, WITHOUT LONG-TERM CURRENT USE OF INSULIN (HCC): ICD-10-CM

## 2022-10-21 DIAGNOSIS — E66.09 CLASS 1 OBESITY DUE TO EXCESS CALORIES WITH SERIOUS COMORBIDITY AND BODY MASS INDEX (BMI) OF 33.0 TO 33.9 IN ADULT: ICD-10-CM

## 2022-10-21 DIAGNOSIS — I71.21 ANEURYSM OF ASCENDING AORTA WITHOUT RUPTURE: ICD-10-CM

## 2022-10-21 LAB — HBA1C MFR BLD HPLC: 6.3 %

## 2022-10-21 PROCEDURE — 99214 OFFICE O/P EST MOD 30 MIN: CPT | Performed by: NURSE PRACTITIONER

## 2022-10-21 PROCEDURE — 1123F ACP DISCUSS/DSCN MKR DOCD: CPT | Performed by: NURSE PRACTITIONER

## 2022-10-21 PROCEDURE — 83036 HEMOGLOBIN GLYCOSYLATED A1C: CPT | Performed by: NURSE PRACTITIONER

## 2022-10-21 PROCEDURE — 3044F HG A1C LEVEL LT 7.0%: CPT | Performed by: NURSE PRACTITIONER

## 2022-10-21 RX ORDER — ROSUVASTATIN CALCIUM 10 MG/1
10 TABLET, COATED ORAL
Qty: 90 TABLET | Refills: 0 | Status: SHIPPED | OUTPATIENT
Start: 2022-10-21

## 2022-10-21 NOTE — LETTER
NOTIFICATION RETURN TO WORK / SCHOOL    10/21/2022 10:08 AM    Mr. Ankush Ramirez  [facility] [address]      To Whom It May Concern:    Ankush Ramirez is currently under the care of Celia Kaba. He will return to work/school on: 10/24    If there are questions or concerns please have the patient contact our office.         Sincerely,      Louie Meier NP

## 2022-10-21 NOTE — PROGRESS NOTES
[facility]   [address], [address] [facility]  P: [phone]  F: [phone]    SUBJECTIVE     HPI:     Ankush Ramirez is a 73 y.o. male who is seen in the clinic for   Chief Complaint   Patient presents with    Diabetes     Follow up      Letter for School/Work      The patient presents today for management of his co-morbidities. HTN: BP today is 140/80. Endorses white coat HTN. Currently on Lotensin HCT, Amlodipine,  and Avodart. LVH: mild upon Echo. Managed primarily by Dr. Marlene Kemp (Cardiology). Next appt is 10/28. Ascending Aortic Aneurysm: 3.9 cm in diameter. Managed primarily by Dr. Marlene Kemp (Cardiology). Next appt is 10/28. Mixed HLD: Total Cholesterol 200 and  in September. Has since started taking OTC Omega 3 Fatty Acid. DM II: HA1C today is 6.3. Has gained 8 lbs. Current BMI is 34.41. Currently on Glimiperide 1 mg once daily. BPH: well managed with Avodart. Patient Active Problem List    Diagnosis    HTN (hypertension)    Elevated troponin I level    New onset type 2 diabetes mellitus (HCC)    Benign prostatic hyperplasia with nocturia    Essential hypertension    Obesity (BMI 30.0-34. 9)    Arthritis of lumbar spine    Primary osteoarthritis of right knee        Past Medical History:   Diagnosis Date    Hypertension      Past Surgical History:   Procedure Laterality Date    HX ORTHOPAEDIC      Achilles Tendon/ left foot      Social History     Socioeconomic History    Marital status:      Spouse name: Not on file    Number of children: Not on file    Years of education: Not on file    Highest education level: Not on file   Occupational History    Not on file   Tobacco Use    Smoking status: Never    Smokeless tobacco: Never   Vaping Use    Vaping Use: Never used   Substance and Sexual Activity    Alcohol use: No    Drug use: No    Sexual activity: Yes     Partners: Female   Other Topics Concern    Not on file   Social History Narrative    Not on file     Social Determinants of Health     Financial Resource Strain: Not on file   Food Insecurity: Not on file   Transportation Needs: Not on file   Physical Activity: Not on file   Stress: Not on file   Social Connections: Not on file   Intimate Partner Violence: Not on file   Housing Stability: Not on file     Family History   Problem Relation Age of Onset    Diabetes Mother     Diabetes Sister     Hypertension Sister     Diabetes Brother     Hypertension Brother      Immunization History   Administered Date(s) Administered    COVID-19, PFIZER PURPLE top, DILUTE for use, (age 15 y+), IM, 30mcg/0.3mL 04/14/2021, 05/05/2021    Influenza, FLUARIX, FLULAVAL, FLUZONE (age 10 mo+) AND AFLURIA, (age 1 y+), PF, 0.5mL 02/06/2018    Pneumococcal Polysaccharide (PPSV-23) 12/01/2021      Allergies   Allergen Reactions    Metformin Diarrhea    Thorazine [Chlorpromazine] Shortness of Breath    Prednisone Other (comments)     Hiccups         Office Visit on 10/21/2022   Component Date Value Ref Range Status    Hemoglobin A1c (POC) 10/21/2022 6.3  % Final   Orders Only on 09/15/2022   Component Date Value Ref Range Status    Cholesterol, total 09/15/2022 200 (A)  <200 MG/DL Final    Triglyceride 09/15/2022 91  <150 MG/DL Final    Based on NCEP-ATP III:  Triglycerides <150 mg/dL  is considered normal, 150-199 mg/dL  borderline high,  200-499 mg/dL high and  greater than or equal to 500 mg/dL very high. HDL Cholesterol 09/15/2022 49  MG/DL Final    Based on NCEP ATP III, HDL Cholesterol <40 mg/dL is considered low and >60 mg/dL is elevated.     LDL, calculated 09/15/2022 132.8 (A)  0 - 100 MG/DL Final    Comment: Based on the NCEP-ATP: LDL-C concentrations are considered  optimal <100 mg/dL,  near optimal/above Normal 100-129 mg/dL  Borderline High: 130-159, High: 160-189 mg/dL  Very High: Greater than or equal to 190 mg/dL      VLDL, calculated 09/15/2022 18.2  MG/DL Final    CHOL/HDL Ratio 09/15/2022 4.1  0.0 - 5.0   Final WBC 09/15/2022 6.0  4.1 - 11.1 K/uL Final    RBC 09/15/2022 5.09  4. 10 - 5.70 M/uL Final    HGB 09/15/2022 15.8  12.1 - 17.0 g/dL Final    HCT 09/15/2022 47.7  36.6 - 50.3 % Final    MCV 09/15/2022 93.7  80.0 - 99.0 FL Final    MCH 09/15/2022 31.0  26.0 - 34.0 PG Final    MCHC 09/15/2022 33.1  30.0 - 36.5 g/dL Final    RDW 09/15/2022 14.5  11.5 - 14.5 % Final    PLATELET 00/55/3402 191  150 - 400 K/uL Final    MPV 09/15/2022 11.4  8.9 - 12.9 FL Final    NRBC 09/15/2022 0.0  0  WBC Final    ABSOLUTE NRBC 09/15/2022 0.00  0.00 - 0.01 K/uL Final    NEUTROPHILS 09/15/2022 35  32 - 75 % Final    LYMPHOCYTES 09/15/2022 46  12 - 49 % Final    MONOCYTES 09/15/2022 8  5 - 13 % Final    EOSINOPHILS 09/15/2022 10 (A)  0 - 7 % Final    BASOPHILS 09/15/2022 1  0 - 1 % Final    IMMATURE GRANULOCYTES 09/15/2022 0  0.0 - 0.5 % Final    ABS. NEUTROPHILS 09/15/2022 2.1  1.8 - 8.0 K/UL Final    ABS. LYMPHOCYTES 09/15/2022 2.8  0.8 - 3.5 K/UL Final    ABS. MONOCYTES 09/15/2022 0.5  0.0 - 1.0 K/UL Final    ABS. EOSINOPHILS 09/15/2022 0.6 (A)  0.0 - 0.4 K/UL Final    ABS. BASOPHILS 09/15/2022 0.1  0.0 - 0.1 K/UL Final    ABS. IMM.  GRANS. 09/15/2022 0.0  0.00 - 0.04 K/UL Final    DF 09/15/2022 AUTOMATED    Final    Sodium 09/15/2022 139  136 - 145 mmol/L Final    Potassium 09/15/2022 4.7  3.5 - 5.1 mmol/L Final    Chloride 09/15/2022 105  97 - 108 mmol/L Final    CO2 09/15/2022 30  21 - 32 mmol/L Final    Anion gap 09/15/2022 4 (A)  5 - 15 mmol/L Final    Glucose 09/15/2022 104 (A)  65 - 100 mg/dL Final    BUN 09/15/2022 21 (A)  6 - 20 MG/DL Final    Creatinine 09/15/2022 1.30  0.70 - 1.30 MG/DL Final    BUN/Creatinine ratio 09/15/2022 16  12 - 20   Final    GFR est AA 09/15/2022 >60  >60 ml/min/1.73m2 Final    GFR est non-AA 09/15/2022 55 (A)  >60 ml/min/1.73m2 Final    Estimated GFR is calculated using the IDMS-traceable Modification of Diet in Renal Disease (MDRD) Study equation, reported for both  Americans (GFRAA) and non- Americans (GFRNA), and normalized to 1.73m2 body surface area. The physician must decide which value applies to the patient. Calcium 09/15/2022 9.3  8.5 - 10.1 MG/DL Final    Bilirubin, total 09/15/2022 0.5  0.2 - 1.0 MG/DL Final    ALT (SGPT) 09/15/2022 51  12 - 78 U/L Final    AST (SGOT) 09/15/2022 36  15 - 37 U/L Final    Alk. phosphatase 09/15/2022 72  45 - 117 U/L Final    Protein, total 09/15/2022 8.5 (A)  6.4 - 8.2 g/dL Final    Albumin 09/15/2022 4.1  3.5 - 5.0 g/dL Final    Globulin 09/15/2022 4.4 (A)  2.0 - 4.0 g/dL Final    A-G Ratio 09/15/2022 0.9 (A)  1.1 - 2.2   Final   Office Visit on 07/27/2022   Component Date Value Ref Range Status    Hemoglobin A1c (POC) 07/27/2022 6.2  % Final      XR HAND RT MIN 3 V  Narrative: EXAM: XR HAND RT MIN 3 V    INDICATION: right hand pain. COMPARISON: None. FINDINGS: Three views of the right hand demonstrate no fracture or other acute  osseous or articular abnormality. The soft tissues are within normal limits. Severe first [address]ia 16 joint osteoarthritis. Tiny erosions at the second MCP joint. Central erosions are in the third and fifth DIP joints. No chondrocalcinosis or  periosteal reaction. Normal bone mineralization. Impression: Combination of osteoarthritis and erosive osteoarthritis. Current Outpatient Medications   Medication Sig Dispense Refill    rosuvastatin (CRESTOR) 10 mg tablet Take 1 Tablet by mouth nightly. 90 Tablet 0    benazepril-hydroCHLOROthiazide (LOTENSIN HCT) 10-12.5 mg per tablet Take 1 Tablet by mouth in the morning. 90 Tablet 0    amLODIPine (NORVASC) 10 mg tablet Take 1 Tablet by mouth in the morning. Needs appointment. 90 Tablet 0    glimepiride (AMARYL) 1 mg tablet Take 1 Tablet by mouth Daily (before breakfast). 90 Tablet 0    dutasteride (AVODART) 0.5 mg capsule Take 1 Capsule by mouth in the morning. 90 Capsule 0    omega 3-DHA-EPA-fish oil (Fish OiL) 1,000 mg (120 mg-180 mg) capsule Take 1 Capsule by mouth daily. 90 Capsule 1    Blood-Glucose Meter monitoring kit Test once daily before breakfast 1 Kit 1    glucose blood VI test strips (blood glucose test) strip Test as directed in clinic. Dx: Diabetes mellitus Type 2 200 Strip 5    lancets misc Please use one lancet with every blood sugar reading 1 Each 11    aspirin 81 mg chewable tablet Take 1 Tablet by mouth daily. 30 Tablet 0           The past medical history, past surgical history, and family history were reviewed and updated in the medical record. Lab values/Imaging were reviewed. The medications were reviewed and updated in the medical record. Immunizations were reviewed and updated in the medical record. All relevant preventative screenings reviewed and updated in the medical record. REVIEW OF SYSTEMS   Review of Systems   Constitutional:  Negative for malaise/fatigue and weight loss. HENT:  Negative for congestion and sore throat. Eyes:  Negative for blurred vision. Respiratory:  Negative for cough and shortness of breath. Cardiovascular:  Negative for chest pain and leg swelling. Gastrointestinal:  Negative for constipation and heartburn. Genitourinary:  Negative for frequency and urgency. Musculoskeletal:  Negative for back pain, joint pain and myalgias. Neurological:  Negative for dizziness and headaches. Psychiatric/Behavioral:  Negative for depression. The patient is not nervous/anxious and does not have insomnia. PHYSICAL EXAM   BP (!) 140/80 (BP 1 Location: Left upper arm, BP Patient Position: Sitting, BP Cuff Size: Adult long)   Pulse 60   Temp 98.7 °F (37.1 °C) (Temporal)   Resp 16   Ht 5' 9\" (1.753 m)   Wt 233 lb (105.7 kg)   BMI 34.41 kg/m²      Physical Exam  Vitals and nursing note reviewed. Constitutional:       General: He is not in acute distress. Appearance: Normal appearance. He is well-developed. He is not diaphoretic. HENT:      Head: Normocephalic and atraumatic.       Right Ear: Tympanic membrane, ear canal and external ear normal.      Left Ear: Tympanic membrane, ear canal and external ear normal.      Mouth/Throat:      Mouth: Mucous membranes are moist.      Pharynx: Oropharynx is clear. Eyes:      General: No scleral icterus. Right eye: No discharge. Left eye: No discharge. Extraocular Movements: Extraocular movements intact. Conjunctiva/sclera: Conjunctivae normal.   Neck:      Thyroid: No thyromegaly. Cardiovascular:      Rate and Rhythm: Normal rate and regular rhythm. Pulses: Normal pulses. Dorsalis pedis pulses are 2+ on the right side and 2+ on the left side. Pulmonary:      Effort: Pulmonary effort is normal.      Breath sounds: Normal breath sounds. No wheezing. Abdominal:      General: Bowel sounds are normal. There is no distension. Palpations: Abdomen is soft. Tenderness: no abdominal tenderness   Musculoskeletal:      Cervical back: Normal range of motion and neck supple. Right lower leg: No edema. Left lower leg: No edema. Comments: B/L knees without crepitus   Lymphadenopathy:      Cervical: No cervical adenopathy. ASSESSMENT/ PLAN   Below is the assessment and plan developed based on review of pertinent history, physical exam, labs, studies, and medications. 1. Primary hypertension  Continue with current treatment regimen. 2. Aneurysm of ascending aorta without rupture  Managed primarily by Cardiology. 3. Left ventricular hypertrophy  Managed primarily by Cardiology. 4. White coat syndrome with diagnosis of hypertension  Likely the cause of the mildly elevated BP today. 5. Mixed hyperlipidemia  Will re-check Lipid Panel in 3 months. -     rosuvastatin (CRESTOR) 10 mg tablet; Take 1 Tablet by mouth nightly., Normal, Disp-90 Tablet, R-0Appointment needed for future refills.   6. Type 2 diabetes mellitus without complication, without long-term current use of insulin ([address]  Continue with current treatment regimen. -     AMB POC HEMOGLOBIN A1C  7. Benign prostatic hyperplasia without lower urinary tract symptoms  Continue with current treatment regimen. 8. Class 1 obesity due to excess calories with serious comorbidity and body mass index (BMI) of 33.0 to 33.9 in adult  Educated patient on different lifestyle modifications to loose weight. -     AMB POC HEMOGLOBIN A1C           Follow-up and Dispositions    Return in about 3 months (around 1/21/2023) for Management of co-morbidities. Will re-check Lipid Panel w/ add of Crestor. Disclaimer:  Advised patient to call back or return to office if symptoms worsen/change/persist.  Discussed expected course/resolution/complications of diagnosis in detail with patient. Medication risks/benefits/alternatives discussed with patient. Patient was given an after visit summary which includes diagnoses, current medications, & vitals. Discussed patient instructions and advised to read to all patient instructions regarding care. Patient expressed understanding with the diagnosis and plan.        Joaquín Jeffries NP  10/21/2022

## 2022-10-21 NOTE — PROGRESS NOTES
Chief Complaint   Patient presents with    Diabetes     Follow up      Letter for School/Work       Health Maintenance Due   Topic    Eye Exam Retinal or Dilated         1. \"Have you been to the ER, urgent care clinic since your last visit? Hospitalized since your last visit? \" No    2. \"Have you seen or consulted any other health care providers outside of the [address] [place] [address] since your last visit? \" No     3. For patients aged 39-70: Has the patient had a colonoscopy / FIT/ Cologuard? NA - based on age      If the patient is female:    4. For patients aged 41-77: Has the patient had a mammogram within the past 2 years? NA - based on age or sex      11. For patients aged 21-65: Has the patient had a pap smear?  NA - based on age or sex     Visit Vitals  BP (!) 153/83 (BP 1 Location: Left upper arm, BP Patient Position: Sitting, BP Cuff Size: Adult long)   Pulse 60   Temp 98.7 °F (37.1 °C) (Temporal)   Resp 16   Ht 5' 9\" (1.753 m)   Wt 233 lb (105.7 kg)   BMI 34.41 kg/m²

## 2023-01-16 ENCOUNTER — HOSPITAL ENCOUNTER (EMERGENCY)
Age: 68
Discharge: HOME | End: 2023-01-16
Attending: STUDENT IN AN ORGANIZED HEALTH CARE EDUCATION/TRAINING PROGRAM
Payer: MEDICARE

## 2023-01-16 ENCOUNTER — APPOINTMENT (OUTPATIENT)
Dept: GENERAL RADIOLOGY | Age: 68
End: 2023-01-16
Payer: MEDICARE

## 2023-01-16 VITALS
SYSTOLIC BLOOD PRESSURE: 164 MMHG | HEART RATE: 74 BPM | TEMPERATURE: 97.9 F | RESPIRATION RATE: 16 BRPM | DIASTOLIC BLOOD PRESSURE: 87 MMHG | OXYGEN SATURATION: 100 %

## 2023-01-16 DIAGNOSIS — M10.9 ACUTE GOUT INVOLVING TOE OF RIGHT FOOT, UNSPECIFIED CAUSE: Primary | ICD-10-CM

## 2023-01-16 PROCEDURE — 74011250636 HC RX REV CODE- 250/636

## 2023-01-16 PROCEDURE — 73630 X-RAY EXAM OF FOOT: CPT

## 2023-01-16 PROCEDURE — 96372 THER/PROPH/DIAG INJ SC/IM: CPT

## 2023-01-16 PROCEDURE — 99284 EMERGENCY DEPT VISIT MOD MDM: CPT

## 2023-01-16 RX ORDER — HYDROCODONE BITARTRATE AND ACETAMINOPHEN 5; 325 MG/1; MG/1
1 TABLET ORAL
Qty: 6 TABLET | Refills: 0 | Status: SHIPPED | OUTPATIENT
Start: 2023-01-16 | End: 2023-01-23

## 2023-01-16 RX ORDER — INDOMETHACIN 50 MG/1
50 CAPSULE ORAL 3 TIMES DAILY
Qty: 9 CAPSULE | Refills: 0 | Status: SHIPPED | OUTPATIENT
Start: 2023-01-16 | End: 2023-01-19

## 2023-01-16 RX ORDER — KETOROLAC TROMETHAMINE 30 MG/ML
30 INJECTION, SOLUTION INTRAMUSCULAR; INTRAVENOUS
Status: COMPLETED | OUTPATIENT
Start: 2023-01-16 | End: 2023-01-16

## 2023-01-16 RX ORDER — DEXAMETHASONE 4 MG/1
10 TABLET ORAL
Status: COMPLETED | OUTPATIENT
Start: 2023-01-16 | End: 2023-01-16

## 2023-01-16 RX ADMIN — KETOROLAC TROMETHAMINE 30 MG: 30 INJECTION, SOLUTION INTRAMUSCULAR; INTRAVENOUS at 17:48

## 2023-01-16 RX ADMIN — DEXAMETHASONE 10 MG: 4 TABLET ORAL at 17:58

## 2023-01-16 NOTE — DISCHARGE INSTRUCTIONS
As discussed, your presentation and x-ray reflect most likely that you have gout in your right big toe joint. You are being prescribed indomethacin which is an NSAID. Do not take ibuprofen with this, but you may take Tylenol 1000 mg every 8 hours as needed for pain. You are also being prescribed a small amount of narcotic pain medication Norco to take only when your pain is severe. This medication contains Tylenol, so do not take more than 3000mg of Tylenol per day total. You may also use ice and elevation as needed for pain. Follow-up with your primary care provider for further management. Return to the ER if significant worsening in pain, swelling, warmth or redness.

## 2023-01-16 NOTE — Clinical Note
[address]  [address] [facility] [address]  [phone]    Work/School Note    Date: 1/16/2023    To Whom It May concern:    Casey Arteaga was seen and treated today in the emergency room by the following provider(s):  Attending Provider: Silverio Machuca DO  Physician Assistant: Tammie Amador PA-C. Casey Arteaga is excused from work/school on 1/16/2023 through 1/18/2023. He is medically clear to return to work/school on 1/19/2023.          Sincerely,          Axel Callaway PA-C

## 2023-01-16 NOTE — ED TRIAGE NOTES
Two days of swelling to the top of his right foot. He doesn't remember an injury.  He says it is painful 10/10

## 2023-01-16 NOTE — ED PROVIDER NOTES
The history is provided by the patient and the spouse. Foot Swelling     Casey Arteaga is a 78 y.o. male with Hx of diabetes and hypertension who presents ambulatory with wife to [facility] ED with cc of swelling on top of R foot for 3 days. Patient reports constant 10/10 pain and swelling on dorsal aspect of his R foot, specifically worst at first MTP. The pain is worse with movement and palpation. He is unable to wear his shoe due to the swelling. Denies trauma, skin injury, insect bite, fever, chills, nausea, vomiting, pain in ankle/leg/other foot. Denies history of blood clot, gout, recent surgery or immobilization, diagnose of diabetic neuropathy in feet. He has been taking Ibuprofen for his pain with minimal relief. PCP: Abel Prajapati NP    There are no other complaints, changes or physical findings at this time.      Past Medical History:   Diagnosis Date    Diabetes ([facility] [address]     Hypertension        Past Surgical History:   Procedure Laterality Date    HX ORTHOPAEDIC      Achilles Tendon/ left foot          Family History:   Problem Relation Age of Onset    Diabetes Mother     Diabetes Sister     Hypertension Sister     Diabetes Brother     Hypertension Brother        Social History     Socioeconomic History    Marital status:      Spouse name: Not on file    Number of children: Not on file    Years of education: Not on file    Highest education level: Not on file   Occupational History    Not on file   Tobacco Use    Smoking status: Never    Smokeless tobacco: Never   Vaping Use    Vaping Use: Never used   Substance and Sexual Activity    Alcohol use: No    Drug use: No    Sexual activity: Yes     Partners: Female   Other Topics Concern    Not on file   Social History Narrative    Not on file     Social Determinants of Health     Financial Resource Strain: Not on file   Food Insecurity: Not on file   Transportation Needs: Not on file   Physical Activity: Not on file   Stress: Not on file   Social Connections: Not on file   Intimate Partner Violence: Not on file   Housing Stability: Not on file         ALLERGIES: Metformin, Thorazine [chlorpromazine], and Prednisone    Review of Systems   Constitutional:  Negative for activity change, appetite change, chills and fever. HENT:  Negative for congestion, rhinorrhea and sore throat. Respiratory:  Negative for cough and shortness of breath. Cardiovascular:  Negative for chest pain. Gastrointestinal:  Negative for abdominal pain, nausea and vomiting. Genitourinary:  Negative for decreased urine volume and difficulty urinating. Musculoskeletal:  Positive for arthralgias and joint swelling. Negative for myalgias. Skin:  Negative for color change and rash. Neurological:  Negative for light-headedness and headaches. Psychiatric/Behavioral:  Negative for agitation and confusion. The patient is not nervous/anxious. Vitals:    01/16/23 1600 01/16/23 1906   BP: (!) 160/81 (!) 164/87   Pulse: 75 74   Resp: 16 16   Temp: 97.9 °F (36.6 °C)    SpO2: 95% 100%            Physical Exam  Vitals and nursing note reviewed. Constitutional:       Appearance: Normal appearance. HENT:      Head: Normocephalic and atraumatic. Right Ear: External ear normal.      Left Ear: External ear normal.      Nose: Nose normal.      Mouth/Throat:      Mouth: Mucous membranes are moist.   Eyes:      Extraocular Movements: Extraocular movements intact. Conjunctiva/sclera: Conjunctivae normal.      Pupils: Pupils are equal, round, and reactive to light. Cardiovascular:      Rate and Rhythm: Normal rate and regular rhythm. Pulses: Normal pulses. Heart sounds: Normal heart sounds. Pulmonary:      Effort: Pulmonary effort is normal.      Breath sounds: Normal breath sounds. Abdominal:      Palpations: Abdomen is soft. Musculoskeletal:         General: Normal range of motion. Cervical back: Normal range of motion and neck supple.       Comments: MEÑO foot: Dorsal aspect of foot has significant edema, warmth to touch, and tenderness. Erythema and increased tenderness on 1st MTP. ROM intact but limited by pain. DP pulse and PT pulse present. No skin breakage. Skin:     General: Skin is warm and dry. Capillary Refill: Capillary refill takes less than 2 seconds. Neurological:      General: No focal deficit present. Mental Status: He is alert and oriented to person, place, and time. Mental status is at baseline. Psychiatric:         Mood and Affect: Mood normal.         Behavior: Behavior normal.         Thought Content: Thought content normal.         Judgment: Judgment normal.        Medical Decision Making  Ddx: Gout, osteoarthritis, septic arthritis, cellulitis    60-year-old male with history of diabetes and hypertension presents with 3 days of atraumatic left foot swelling and pain. On exam, most tender area is first MTP. Osteoarthritis less likely due to acute onset and significant swelling and warmth of foot. Septic arthritis less likely due to absence of constitutional symptoms. Cellulitis less likely due to absence of skin breakage/trauma. XR showed evidence of osteoarthritis but clinically, symptoms appear to correlate more with gout. Gave Decadron and Toradol in ER with improvement in pain. Discharged with 3 days of indomethacin, small amount of Norco, postop shoe for pain relief, and follow-up with PCP (scheduled on Friday). Discussed with attending Dr. Merrick Cisneros who is in agreement with plan. Amount and/or Complexity of Data Reviewed  Radiology: ordered. Decision-making details documented in ED Course. Risk  Prescription drug management. Procedures    LABORATORY TESTS:  No results found for this or any previous visit (from the past 12 hour(s)). IMAGING RESULTS:  XR FOOT RT MIN 3 V   Final Result   Mild osteoarthritis of the first MTP joint. There is no specific   radiographic evidence of gout.           MEDICATIONS GIVEN:  Medications   dexAMETHasone (DECADRON) tablet 10 mg (10 mg Oral Given 1/16/23 9388)   ketorolac (TORADOL) injection 30 mg (30 mg IntraMUSCular Given 1/16/23 6085)       IMPRESSION:  1. Acute gout involving toe of right foot, unspecified cause        PLAN:  1. Discharge Medication List as of 1/16/2023  6:58 PM        START taking these medications    Details   indomethacin (INDOCIN) 50 mg capsule Take 1 Capsule by mouth three (3) times daily for 3 days. With food  Indications: a type of joint disorder due to excess uric acid in the blood called gout, Normal, Disp-9 Capsule, R-0      HYDROcodone-acetaminophen (Norco) 5-325 mg per tablet Take 1 Tablet by mouth every six (6) hours as needed for Pain for up to 6 doses. Max Daily Amount: 4 Tablets., Normal, Disp-6 Tablet, R-0           CONTINUE these medications which have NOT CHANGED    Details   rosuvastatin (CRESTOR) 10 mg tablet TAKE 1 TABLET BY MOUTH NIGHTLY, Normal, Disp-30 Tablet, R-0Appointment needed for future refills. benazepril-hydroCHLOROthiazide (LOTENSIN HCT) 10-12.5 mg per tablet TAKE 1 TABLET BY MOUTH EVERY DAY IN THE MORNING, Normal, Disp-90 Tablet, R-0Appointment needed for future refills. amLODIPine (NORVASC) 10 mg tablet TAKE 1 TABLET BY MOUTH IN THE MORNING. NEEDS APPOINTMENT., Normal, Disp-90 Tablet, R-0Appointment needed for future refills. glimepiride (AMARYL) 1 mg tablet Take 1 Tablet by mouth Daily (before breakfast). , Normal, Disp-90 Tablet, R-0      dutasteride (AVODART) 0.5 mg capsule Take 1 Capsule by mouth in the morning., Normal, Disp-90 Capsule, R-0      omega 3-DHA-EPA-fish oil (Fish OiL) 1,000 mg (120 mg-180 mg) capsule Take 1 Capsule by mouth daily. , Normal, Disp-90 Capsule, R-1      Blood-Glucose Meter monitoring kit Test once daily before breakfast, Normal, Disp-1 Kit, R-1      glucose blood VI test strips (blood glucose test) strip Test as directed in clinic.   Dx: Diabetes mellitus Type 2, Normal, Disp-200 Strip, R-5      lancets misc Please use one lancet with every blood sugar reading, Normal, Disp-1 Each, R-11      aspirin 81 mg chewable tablet Take 1 Tablet by mouth daily. , Normal, Disp-30 Tablet, R-0           2. Follow-up Information       Follow up With Specialties Details Why Contact Info    [address], [facility] Emergency Medicine Go to  As needed, If symptoms worsen [address] [facility]  [phone]    Hernan Foster, [facility] Nurse Practitioner Schedule an appointment as soon as possible for a visit   [address] [place] [phone]  [phone]            3. Return to ED if worse     Presentation, management, and disposition were discussed with the attending physician, Dr. Ovidio Villanueva, who is in agreement with plan of care.

## 2023-01-16 NOTE — Clinical Note
[address]  [address] [facility] [address]  [phone]    Work/School Note    Date: 1/16/2023    To Whom It May concern:    Peggy Alaniz was seen and treated today in the emergency room by the following provider(s):  Attending Provider: Jovani Casarez DO  Physician Assistant: Aylin Zarate PA-C. Peggy Alaniz is excused from work/school on 01/16/23 and 01/17/23. He is medically clear to return to work/school on 1/18/2023.        Sincerely,          Kamila Knight PA-C

## 2023-01-19 ENCOUNTER — OFFICE VISIT (OUTPATIENT)
Dept: PRIMARY CARE CLINIC | Age: 68
End: 2023-01-19
Payer: MEDICARE

## 2023-01-19 VITALS
HEART RATE: 66 BPM | WEIGHT: 231.6 LBS | DIASTOLIC BLOOD PRESSURE: 80 MMHG | BODY MASS INDEX: 34.3 KG/M2 | SYSTOLIC BLOOD PRESSURE: 150 MMHG | OXYGEN SATURATION: 96 % | RESPIRATION RATE: 17 BRPM | TEMPERATURE: 97.5 F | HEIGHT: 69 IN

## 2023-01-19 DIAGNOSIS — E66.09 CLASS 1 OBESITY DUE TO EXCESS CALORIES WITH SERIOUS COMORBIDITY AND BODY MASS INDEX (BMI) OF 34.0 TO 34.9 IN ADULT: ICD-10-CM

## 2023-01-19 DIAGNOSIS — M21.611 BUNION OF RIGHT FOOT: ICD-10-CM

## 2023-01-19 DIAGNOSIS — I10 ESSENTIAL HYPERTENSION: ICD-10-CM

## 2023-01-19 DIAGNOSIS — I43 CARDIOMYOPATHY DUE TO HYPERTENSION, WITHOUT HEART FAILURE (HCC): ICD-10-CM

## 2023-01-19 DIAGNOSIS — I71.9 AORTIC ANEURYSM WITHOUT RUPTURE, UNSPECIFIED PORTION OF AORTA (HCC): ICD-10-CM

## 2023-01-19 DIAGNOSIS — E78.5 HYPERLIPIDEMIA LDL GOAL <70: ICD-10-CM

## 2023-01-19 DIAGNOSIS — E11.9 CONTROLLED TYPE 2 DIABETES MELLITUS WITHOUT COMPLICATION, WITHOUT LONG-TERM CURRENT USE OF INSULIN (HCC): ICD-10-CM

## 2023-01-19 DIAGNOSIS — M10.9 ACUTE GOUT OF RIGHT FOOT, UNSPECIFIED CAUSE: ICD-10-CM

## 2023-01-19 DIAGNOSIS — Z00.00 MEDICARE ANNUAL WELLNESS VISIT, SUBSEQUENT: Primary | ICD-10-CM

## 2023-01-19 DIAGNOSIS — I11.9 CARDIOMYOPATHY DUE TO HYPERTENSION, WITHOUT HEART FAILURE (HCC): ICD-10-CM

## 2023-01-19 DIAGNOSIS — Z00.00 MEDICARE ANNUAL WELLNESS VISIT, SUBSEQUENT: ICD-10-CM

## 2023-01-19 DIAGNOSIS — M21.612 BUNION OF LEFT FOOT: ICD-10-CM

## 2023-01-19 DIAGNOSIS — I51.7 LEFT VENTRICULAR HYPERTROPHY: ICD-10-CM

## 2023-01-19 DIAGNOSIS — N40.0 BENIGN PROSTATIC HYPERPLASIA WITHOUT LOWER URINARY TRACT SYMPTOMS: ICD-10-CM

## 2023-01-19 DIAGNOSIS — Z13.5 SCREENING FOR DIABETIC RETINOPATHY: ICD-10-CM

## 2023-01-19 RX ORDER — INDOMETHACIN 50 MG/1
50 CAPSULE ORAL
Qty: 90 CAPSULE | Refills: 0 | Status: SHIPPED | OUTPATIENT
Start: 2023-01-19 | End: 2023-04-19

## 2023-01-19 NOTE — PROGRESS NOTES
[facility]   [address], [address] [facility]  P: [phone]  F: [phone]    SUBJECTIVE     HPI:     Nakia Riggs is a 78 y.o. male who is seen in the clinic for   Chief Complaint   Patient presents with    Follow-up      The patient presents today for his annual medicare wellness visit. Hx of HTN. BP today is 150/90. Endorses pain. Currently rx'd Amlodipine 10 mg every day and Lotensin-HCT 10 mg-12.5 mg every day. Hx of Cardiomyopathy, LVH, and Ascending Aortic Aneurysm. Managed primarily by Dr. Wanda Durham (Cardiology). Hx of HLD. LDL was 132 previously. Has since started taking Crestor 10 mg QHS and OTC Fish Oil. Hx of BPH. Currently rx'd Dutasteride 0.5 mg every day. Hx of DM II. Previous HA1C was 6.3. Currently taking Glimepiride 1 mg every day. Diet is not well rounded. Exercise is limited. Would like to see a Diabetes Educator. On 1/16, went to ED for suspected Gout Flare. Was rx'd prn Norco and Indomethacin. Indomethacin has helped with the pain. Would like refill and referral to podiatry . Patient also states that he has bilateral foot Bunions. Would like to see Podiatry for management of this. Patient Active Problem List    Diagnosis    Cardiomyopathy due to hypertension, without heart failure ([address]    Aortic aneurysm without rupture, unspecified portion of aorta (HCC)    HTN (hypertension)    Elevated troponin I level    New onset type 2 diabetes mellitus ([address]    Benign prostatic hyperplasia with nocturia    Essential hypertension    Obesity (BMI 30.0-34. 9)    Arthritis of lumbar spine    Primary osteoarthritis of right knee        Past Medical History:   Diagnosis Date    Diabetes ([address]     Hypertension      Past Surgical History:   Procedure Laterality Date    HX ORTHOPAEDIC      Achilles Tendon/ left foot      Social History     Socioeconomic History    Marital status:      Spouse name: Not on file    Number of children: Not on file Years of education: Not on file    Highest education level: Not on file   Occupational History    Not on file   Tobacco Use    Smoking status: Never    Smokeless tobacco: Never   Vaping Use    Vaping Use: Never used   Substance and Sexual Activity    Alcohol use: No    Drug use: No    Sexual activity: Yes     Partners: Female   Other Topics Concern    Not on file   Social History Narrative    Not on file     Social Determinants of Health     Financial Resource Strain: Not on file   Food Insecurity: Not on file   Transportation Needs: Not on file   Physical Activity: Not on file   Stress: Not on file   Social Connections: Not on file   Intimate Partner Violence: Not on file   Housing Stability: Not on file     Family History   Problem Relation Age of Onset    Diabetes Mother     Diabetes Sister     Hypertension Sister     Diabetes Brother     Hypertension Brother      Immunization History   Administered Date(s) Administered    COVID-19, PFIZER PURPLE top, DILUTE for use, (age 15 y+), IM, 30mcg/0.3mL 04/14/2021, 05/05/2021    Influenza, FLUARIX, FLULAVAL, FLUZONE (age 10 mo+) AND AFLURIA, (age 1 y+), PF, 0.5mL 02/06/2018    Pneumococcal Polysaccharide (PPSV-23) 12/01/2021      Allergies   Allergen Reactions    Metformin Diarrhea    Thorazine [Chlorpromazine] Shortness of Breath    Prednisone Other (comments)     Hiccups         Office Visit on 10/21/2022   Component Date Value Ref Range Status    Hemoglobin A1c (POC) 10/21/2022 6.3  % Final      XR FOOT RT MIN 3 V  Narrative: EXAM: XR FOOT RT MIN 3 V    INDICATION: ?gout. COMPARISON: None. FINDINGS: Three views of the right foot demonstrate anatomic alignment and no  acute fracture. Mild joint space narrowing and osteophytosis are seen at the  first MTP joint. No articular erosions are identified. There is soft tissue  swelling of the forefoot. No foreign body is seen in the soft tissues. Impression: Mild osteoarthritis of the first MTP joint.  There is no specific  radiographic evidence of gout. Current Outpatient Medications   Medication Sig Dispense Refill    indomethacin (INDOCIN) 50 mg capsule Take 1 Capsule by mouth three (3) times daily as needed for Gout or Pain for up to 90 days. 90 Capsule 0    HYDROcodone-acetaminophen (Norco) 5-325 mg per tablet Take 1 Tablet by mouth every six (6) hours as needed for Pain for up to 6 doses. Max Daily Amount: 4 Tablets. 6 Tablet 0    rosuvastatin (CRESTOR) 10 mg tablet TAKE 1 TABLET BY MOUTH NIGHTLY 30 Tablet 0    benazepril-hydroCHLOROthiazide (LOTENSIN HCT) 10-12.5 mg per tablet TAKE 1 TABLET BY MOUTH EVERY DAY IN THE MORNING 90 Tablet 0    amLODIPine (NORVASC) 10 mg tablet TAKE 1 TABLET BY MOUTH IN THE MORNING. NEEDS APPOINTMENT. 90 Tablet 0    glimepiride (AMARYL) 1 mg tablet Take 1 Tablet by mouth Daily (before breakfast). 90 Tablet 0    dutasteride (AVODART) 0.5 mg capsule Take 1 Capsule by mouth in the morning. 90 Capsule 0    omega 3-DHA-EPA-fish oil (Fish OiL) 1,000 mg (120 mg-180 mg) capsule Take 1 Capsule by mouth daily. 90 Capsule 1    Blood-Glucose Meter monitoring kit Test once daily before breakfast 1 Kit 1    glucose blood VI test strips (blood glucose test) strip Test as directed in clinic. Dx: Diabetes mellitus Type 2 200 Strip 5    lancets misc Please use one lancet with every blood sugar reading 1 Each 11    aspirin 81 mg chewable tablet Take 1 Tablet by mouth daily. 30 Tablet 0           The past medical history, past surgical history, and family history were reviewed and updated in the medical record. Lab values/Imaging were reviewed. The medications were reviewed and updated in the medical record. Immunizations were reviewed and updated in the medical record. All relevant preventative screenings reviewed and updated in the medical record. REVIEW OF SYSTEMS   Review of Systems   Constitutional:  Negative for malaise/fatigue and weight loss.    HENT:  Negative for congestion and sore throat. Eyes:  Negative for blurred vision. Respiratory:  Negative for cough and shortness of breath. Cardiovascular:  Negative for chest pain and leg swelling. Gastrointestinal:  Negative for constipation and heartburn. Genitourinary:  Negative for frequency and urgency. Musculoskeletal:  Positive for joint pain. Negative for back pain, falls, myalgias and neck pain. Neurological:  Negative for dizziness, weakness and headaches. Psychiatric/Behavioral:  Negative for depression, memory loss and suicidal ideas. The patient is not nervous/anxious and does not have insomnia. PHYSICAL EXAM   BP (!) 150/80 (BP 1 Location: Left upper arm) Comment: manually  Pulse 66   Temp 97.5 °F (36.4 °C)   Resp 17   Ht 5' 9\" (1.753 m)   Wt 231 lb 9.6 oz (105.1 kg)   SpO2 96%   BMI 34.20 kg/m²      Physical Exam  Vitals and nursing note reviewed. Constitutional:       General: He is not in acute distress. Appearance: Normal appearance. He is well-developed. He is not diaphoretic. HENT:      Head: Normocephalic and atraumatic. Right Ear: Tympanic membrane, ear canal and external ear normal.      Left Ear: Tympanic membrane, ear canal and external ear normal.      Mouth/Throat:      Mouth: Mucous membranes are moist.      Pharynx: Oropharynx is clear. Eyes:      General: No scleral icterus. Right eye: No discharge. Left eye: No discharge. Extraocular Movements: Extraocular movements intact. Conjunctiva/sclera: Conjunctivae normal.   Neck:      Thyroid: No thyromegaly. Cardiovascular:      Rate and Rhythm: Normal rate and regular rhythm. Pulses: Normal pulses. Dorsalis pedis pulses are 2+ on the right side and 2+ on the left side. Heart sounds: Normal heart sounds. No murmur heard. Pulmonary:      Effort: Pulmonary effort is normal.      Breath sounds: Normal breath sounds. No wheezing.    Abdominal:      General: Bowel sounds are normal. There is no distension. Palpations: Abdomen is soft. Tenderness: There is no abdominal tenderness. Musculoskeletal:         General: Swelling, tenderness and deformity present. Cervical back: Normal range of motion and neck supple. Lymphadenopathy:      Cervical: No cervical adenopathy. Skin:     Comments: Bilateral Feet Bunions present. Neurological:      General: No focal deficit present. Cranial Nerves: No cranial nerve deficit. Motor: No weakness. Deep Tendon Reflexes:      Reflex Scores:       Patellar reflexes are 2+ on the right side and 2+ on the left side. Psychiatric:         Mood and Affect: Mood normal.          ASSESSMENT/ PLAN   Below is the assessment and plan developed based on review of pertinent history, physical exam, labs, studies, and medications. 1. Medicare annual wellness visit, subsequent  -     METABOLIC PANEL, COMPREHENSIVE; Future  -     CBC WITH AUTOMATED DIFF; Future  -     LIPID PANEL; Future  -     HEMOGLOBIN A1C WITH EAG; Future  2. Controlled type 2 diabetes mellitus without complication, without long-term current use of insulin ([facility] [address]  If HA1C has risen, will adjust Glimepiride dose. -     METABOLIC PANEL, COMPREHENSIVE; Future  -     HEMOGLOBIN A1C WITH EAG; Future  -     REFERRAL TO OPHTHALMOLOGY  -     REFERRAL TO DIABETIC EDUCATION  3. Aortic aneurysm without rupture, unspecified portion of aorta [facility])  Managed primarily by Cardiology. 4. Cardiomyopathy due to hypertension, without heart failure [facility])  Managed primarily by Cardiology. 5. Left ventricular hypertrophy  Managed primarily by Cardiology. 6. Essential hypertension  Elevated BP likely due to experiencing pain while at visit. Advised patient to take BP at home and relay results when pain is better controlled. Proper precautions discussed. -     METABOLIC PANEL, COMPREHENSIVE; Future  -     REFERRAL TO OPHTHALMOLOGY  7.  Hyperlipidemia LDL goal <70  Pending labs, will increase Crestor dose. -     LIPID PANEL; Future  8. Class 1 obesity due to excess calories with serious comorbidity and body mass index (BMI) of 34.0 to 34.9 in adult  -     LIPID PANEL; Future  -     HEMOGLOBIN A1C WITH EAG; Future  -     REFERRAL TO DIABETIC EDUCATION  9. Benign prostatic hyperplasia without lower urinary tract symptoms  Continue with current treatment regimen.   -     PSA W/ REFLX FREE PSA; Future  10. Acute gout of right foot, unspecified cause  If Uric Acid levels are elevated, may refer to proper specialist.   -     URIC ACID; Future  -     REFERRAL TO PODIATRY  -     METABOLIC PANEL, COMPREHENSIVE; Future  -     indomethacin (INDOCIN) 50 mg capsule; Take 1 Capsule by mouth three (3) times daily as needed for Gout or Pain for up to 90 days. , Normal, Disp-90 Capsule, R-0Appointment needed for future refills. 6. Bunion of right foot  -     REFERRAL TO PODIATRY  12. Bunion of left foot  -     REFERRAL TO PODIATRY  13. Screening for diabetic retinopathy  -     REFERRAL TO OPHTHALMOLOGY           Follow-up and Dispositions    Return in about 4 months (around 5/19/2023) for Management of co-morbidities. Disclaimer:  Advised patient to call back or return to office if symptoms worsen/change/persist.  Discussed expected course/resolution/complications of diagnosis in detail with patient. Medication risks/benefits/alternatives discussed with patient. Patient was given an after visit summary which includes diagnoses, current medications, & vitals. Discussed patient instructions and advised to read to all patient instructions regarding care. Patient expressed understanding with the diagnosis and plan.        Ventura Christensen NP  1/19/2023

## 2023-01-19 NOTE — LETTER
NOTIFICATION RETURN TO WORK / SCHOOL    1/19/2023 11:48 AM    Mr. Austyn Hastings  [facility] [address]      To Whom It May Concern:    Austyn Hastings is currently under the care of Celia Kaba. He will return to work/school on: 1/23    If there are questions or concerns please have the patient contact our office.         Sincerely,      Damon Yusuf, NP

## 2023-01-19 NOTE — PROGRESS NOTES
Chief Complaint   Patient presents with    Follow-up       Visit Vitals  BP (!) 150/80 (BP 1 Location: Left upper arm) Comment: manually   Pulse 66   Temp 97.5 °F (36.4 °C)   Resp 17   Ht 5' 9\" (1.753 m)   Wt 231 lb 9.6 oz (105.1 kg)   SpO2 96%   BMI 34.20 kg/m²       1. Have you been to the ER, urgent care clinic since your last visit? Hospitalized since your last visit? Yes [facility]    2. Have you seen or consulted any other health care providers outside of the [address] [place] [address] since your last visit? Include any pap smears or colon screening. No      3. For patients aged 39-70: Has the patient had a colonoscopy / FIT/ Cologuard? Yes - no Care Gap present  NN Medicare Wellness Visit      Connie Hoskins is a 78 y.o. male and presents for Annual Medicare Wellness Visit. Assessment of cognitive impairment: Alert and oriented x 4. Depression Screen:   3 most recent PHQ Screens 10/21/2022   Little interest or pleasure in doing things Not at all   Feeling down, depressed, irritable, or hopeless Not at all   Total Score PHQ 2 0       Fall Risk Assessment:    Fall Risk Assessment, last 12 mths 1/19/2023   Able to walk? Yes   Fall in past 12 months? 0   Do you feel unsteady? 0   Are you worried about falling 0       Abuse Screen:   Abuse Screening Questionnaire 1/19/2023   Do you ever feel afraid of your partner? N   Are you in a relationship with someone who physically or mentally threatens you? N   Is it safe for you to go home? Y       Activities of Daily Living:  Self-care.    Requires assistance with: no ADLs  Patient handle his/her own medications  Yes Use of pill box  no  Activities of Daily Living:   ADL Assessment 1/19/2023   Feeding yourself No Help Needed   Getting from bed to chair No Help Needed   Getting dressed No Help Needed   Bathing or showering No Help Needed   Walk across the room (includes cane/walker) No Help Needed   Using the telphone No Help Needed   Taking your medications No Help Needed   Preparing meals No Help Needed   Managing money (expenses/bills) No Help Needed   Moderately strenuous housework (laundry) No Help Needed   Shopping for personal items (toiletries/medicines) No Help Needed   Shopping for groceries No Help Needed   Driving No Help Needed   Climbing a flight of stairs No Help Needed   Getting to places beyond walking distances No Help Needed       Health Maintenance:  Daily Aspirin: no  Bone Density: na  Glaucoma Screening: no it has been longer than 3 years  Immunizations:    Tetanus: not up to date - declines Influenza: not up to date - declines   Shingles: not up to date - declines   PPSV-23: up to date. Prevnar-13: not up to date - declines  Covid19: up to date    Cancer screening:      Colon: unknown, record requested. Prostate:  not up to date - 12/1/21    Alcohol Risk Screen:   On any occasion during the past 3 months, have you had more than 3 drinks(female) or 4 drinks (male) containing alcohol in one? No  Do you average more than 7 drinks (female) or 14 drinks (male) per week? No  Type and amount: no    Hearing Loss:  denies any hearing loss    Vision Loss:   Wears glasses, contact lenses, or have any other visual impairment  no    Adult Nutrition Screen:  No risk factors noted. Advance Care Planning:   End of Life Planning: has NO advanced directive - not interested in additional information  Fina Medina ACP-Facilitator appointment no      Medications/Allergies: Reviewed with patient  Prior to Admission medications    Medication Sig Start Date End Date Taking? Authorizing Provider   indomethacin (INDOCIN) 50 mg capsule Take 1 Capsule by mouth three (3) times daily for 3 days.  With food  Indications: a type of joint disorder due to excess uric acid in the blood called gout 1/16/23 1/19/23 Yes Barby Whyte PA-C   HYDROcodone-acetaminophen (Norco) 5-325 mg per tablet Take 1 Tablet by mouth every six (6) hours as needed for Pain for up to 6 doses. Max Daily Amount: 4 Tablets. 1/16/23 1/23/23 Yes Barby Whyte PA-C   rosuvastatin (CRESTOR) 10 mg tablet TAKE 1 TABLET BY MOUTH NIGHTLY 1/15/23  Yes Robb Perez NP   benazepril-hydroCHLOROthiazide (LOTENSIN HCT) 10-12.5 mg per tablet TAKE 1 TABLET BY MOUTH EVERY DAY IN THE MORNING 11/2/22  Yes Robb Perez NP   amLODIPine (NORVASC) 10 mg tablet TAKE 1 TABLET BY MOUTH IN THE MORNING. NEEDS APPOINTMENT. 11/1/22  Yes Anitha Finley NP   glimepiride (AMARYL) 1 mg tablet Take 1 Tablet by mouth Daily (before breakfast). 7/27/22  Yes Anitha Finely NP   dutasteride (AVODART) 0.5 mg capsule Take 1 Capsule by mouth in the morning. 7/27/22  Yes Anitha Finley NP   omega 3-DHA-EPA-fish oil (Fish OiL) 1,000 mg (120 mg-180 mg) capsule Take 1 Capsule by mouth daily. 6/27/22  Yes Anitha Finley NP   Blood-Glucose Meter monitoring kit Test once daily before breakfast 5/19/22  Yes Robb Perez NP   glucose blood VI test strips (blood glucose test) strip Test as directed in clinic. Dx: Diabetes mellitus Type 2 5/19/22  Yes Anitha Finley NP   lancets misc Please use one lancet with every blood sugar reading 5/19/22  Yes Anitha Finley NP   aspirin 81 mg chewable tablet Take 1 Tablet by mouth daily. 5/11/22  Yes Jessica Queen MD       Allergies   Allergen Reactions    Metformin Diarrhea    Thorazine [Chlorpromazine] Shortness of Breath    Prednisone Other (comments)     Hiccups         Objective:  Visit Vitals  BP (!) 150/80 (BP 1 Location: Left upper arm) Comment: manually   Pulse 66   Temp 97.5 °F (36.4 °C)   Resp 17   Ht 5' 9\" (1.753 m)   Wt 231 lb 9.6 oz (105.1 kg)   SpO2 96%   BMI 34.20 kg/m²    Body mass index is 34.2 kg/m². Problem List: Reviewed with patient and discussed risk factors. Patient Active Problem List   Diagnosis Code    Essential hypertension I10    Obesity (BMI 30.0-34. 9) E66.9    Arthritis of lumbar spine M47.816    Primary osteoarthritis of right knee M17.11 New onset type 2 diabetes mellitus (HCC) E11.9    Benign prostatic hyperplasia with nocturia N40.1, R35.1    HTN (hypertension) I10    Elevated troponin I level R77.8    Cardiomyopathy due to hypertension, without heart failure (HCC) I11.9, I43    Aortic aneurysm without rupture, unspecified portion of aorta (HCC) I71.9       PSH: Reviewed with patient  Past Surgical History:   Procedure Laterality Date    HX ORTHOPAEDIC      Achilles Tendon/ left foot         SH: Reviewed with patient  Social History     Tobacco Use    Smoking status: Never    Smokeless tobacco: Never   Vaping Use    Vaping Use: Never used   Substance Use Topics    Alcohol use: No    Drug use: No       FH: Reviewed with patient  Family History   Problem Relation Age of Onset    Diabetes Mother     Diabetes Sister     Hypertension Sister     Diabetes Brother     Hypertension Brother        Current medical providers:    Patient Care Team:  Iraida Bolden NP as PCP - General (Nurse Practitioner)  [facility] Bold[facility]  Kyle Dejesus RN    Plan:      No orders of the defined types were placed in this encounter.       Health Maintenance   Topic Date Due    Eye Exam Retinal or Dilated  Never done    Medicare Yearly Exam  Never done    COVID-19 Vaccine (3 - Booster for Pfizer series) 06/30/2021    Shingles Vaccine (1 of 2) 06/03/2023 (Originally 11/1/2006)    DTaP/Tdap/Td series (1 - Tdap) 06/27/2023 (Originally 11/1/1976)    Flu Vaccine (1) 06/30/2023 (Originally 8/1/2022)    Colorectal Cancer Screening Combo  06/19/2023    Diabetic Alb to Cr ratio (uACR) test  06/27/2023    Foot Exam Q1  06/27/2023    GFR test (Diabetes, CKD 3-4, OR last GFR 15-59)  09/15/2023    Lipid Screen  09/15/2023    Depression Screen  10/21/2023    A1C test (Diabetic or Prediabetic)  10/21/2023    Hepatitis C Screening  Completed    Pneumococcal 65+ years  Completed          Urinary/ Fecal Incontinence: No    Regular physical exercise: lifting through work    Patient verbalized understanding of information presented. AVS and Medicare Part B Preventive Services Table printed and given to pt and reviewed. See table for findings under Recommendation and Scheduled. All of the patient's questions were answered.

## 2023-01-19 NOTE — PROGRESS NOTES
Subjective  Nannette Martinez is an 78 y.o. male who presents for ***    ***    Allergies - reviewed: Allergies   Allergen Reactions    Metformin Diarrhea    Thorazine [Chlorpromazine] Shortness of Breath    Prednisone Other (comments)     Hiccups           Medications - reviewed:   Current Outpatient Medications   Medication Sig    indomethacin (INDOCIN) 50 mg capsule Take 1 Capsule by mouth three (3) times daily for 3 days. With food  Indications: a type of joint disorder due to excess uric acid in the blood called gout    HYDROcodone-acetaminophen (Norco) 5-325 mg per tablet Take 1 Tablet by mouth every six (6) hours as needed for Pain for up to 6 doses. Max Daily Amount: 4 Tablets. rosuvastatin (CRESTOR) 10 mg tablet TAKE 1 TABLET BY MOUTH NIGHTLY    benazepril-hydroCHLOROthiazide (LOTENSIN HCT) 10-12.5 mg per tablet TAKE 1 TABLET BY MOUTH EVERY DAY IN THE MORNING    amLODIPine (NORVASC) 10 mg tablet TAKE 1 TABLET BY MOUTH IN THE MORNING. NEEDS APPOINTMENT.    glimepiride (AMARYL) 1 mg tablet Take 1 Tablet by mouth Daily (before breakfast). dutasteride (AVODART) 0.5 mg capsule Take 1 Capsule by mouth in the morning. omega 3-DHA-EPA-fish oil (Fish OiL) 1,000 mg (120 mg-180 mg) capsule Take 1 Capsule by mouth daily. Blood-Glucose Meter monitoring kit Test once daily before breakfast    glucose blood VI test strips (blood glucose test) strip Test as directed in clinic. Dx: Diabetes mellitus Type 2    lancets misc Please use one lancet with every blood sugar reading    aspirin 81 mg chewable tablet Take 1 Tablet by mouth daily. No current facility-administered medications for this visit.          Past Medical History - reviewed:  Past Medical History:   Diagnosis Date    Diabetes ([address]     Hypertension          Past Surgical History - reviewed:   Past Surgical History:   Procedure Laterality Date    HX ORTHOPAEDIC      Achilles Tendon/ left foot          Social History - reviewed:  Social History Socioeconomic History    Marital status:      Spouse name: Not on file    Number of children: Not on file    Years of education: Not on file    Highest education level: Not on file   Occupational History    Not on file   Tobacco Use    Smoking status: Never    Smokeless tobacco: Never   Vaping Use    Vaping Use: Never used   Substance and Sexual Activity    Alcohol use: No    Drug use: No    Sexual activity: Yes     Partners: Female   Other Topics Concern    Not on file   Social History Narrative    Not on file     Social Determinants of Health     Financial Resource Strain: Not on file   Food Insecurity: Not on file   Transportation Needs: Not on file   Physical Activity: Not on file   Stress: Not on file   Social Connections: Not on file   Intimate Partner Violence: Not on file   Housing Stability: Not on file         Family History - reviewed:  Family History   Problem Relation Age of Onset    Diabetes Mother     Diabetes Sister     Hypertension Sister     Diabetes Brother     Hypertension Brother          Immunizations - reviewed:   Immunization History   Administered Date(s) Administered    COVID-19, PFIZER PURPLE top, DILUTE for use, (age 15 y+), IM, 30mcg/0.3mL 04/14/2021, 05/05/2021    Influenza, FLUARIX, FLULAVAL, FLUZONE (age 10 mo+) AND AFLURIA, (age 1 y+), PF, 0.5mL 02/06/2018    Pneumococcal Polysaccharide (PPSV-23) 12/01/2021     Flu: ***      ROS  CONSTITUTIONAL: Denies fever, chills, unintentional weight loss. EYES: {:624412}  ENT: {:896632}  CARDIOVASCULAR: Denies chest pain, orthopnea, PND. RESPIRATORY: Denies cough, dyspnea, wheezing, hemoptysis. ENDOCRINE: {:625046}  GI: Denies abdominal pain, diarrhea, constipation, diarrhea, black or bloody stool. : {:442784}  NEURO: {:056641}  MUSCULOSKELETAL: {:119447}  SKIN: {:292418}  PSYCH: {:920602}      Physical Exam  There were no vitals taken for this visit.   Physical Exam      Assessment/Plan  {No Diagnosis Found}            I have discussed the diagnosis with the patient and the intended plan as seen in the above orders. Patient verbalized understanding of the plan and agrees with the plan. I have discussed medication side effects and warnings with the patient as well. Informed patient to return to the office if new symptoms arise.         Fredia Blizzard, DO

## 2023-01-20 LAB
ALBUMIN SERPL-MCNC: 3.8 G/DL (ref 3.5–5)
ALBUMIN/GLOB SERPL: 0.9 (ref 1.1–2.2)
ALP SERPL-CCNC: 65 U/L (ref 45–117)
ALT SERPL-CCNC: 41 U/L (ref 12–78)
ANION GAP SERPL CALC-SCNC: 5 MMOL/L (ref 5–15)
AST SERPL-CCNC: 21 U/L (ref 15–37)
BASOPHILS # BLD: 0.1 K/UL (ref 0–0.1)
BASOPHILS NFR BLD: 1 % (ref 0–1)
BILIRUB SERPL-MCNC: 0.3 MG/DL (ref 0.2–1)
BUN SERPL-MCNC: 26 MG/DL (ref 6–20)
BUN/CREAT SERPL: 23 (ref 12–20)
CALCIUM SERPL-MCNC: 9.1 MG/DL (ref 8.5–10.1)
CHLORIDE SERPL-SCNC: 107 MMOL/L (ref 97–108)
CHOLEST SERPL-MCNC: 147 MG/DL
CO2 SERPL-SCNC: 28 MMOL/L (ref 21–32)
COMMENT, HOLDF: NORMAL
CREAT SERPL-MCNC: 1.15 MG/DL (ref 0.7–1.3)
DIFFERENTIAL METHOD BLD: ABNORMAL
EOSINOPHIL # BLD: 0.3 K/UL (ref 0–0.4)
EOSINOPHIL NFR BLD: 4 % (ref 0–7)
ERYTHROCYTE [DISTWIDTH] IN BLOOD BY AUTOMATED COUNT: 13.6 % (ref 11.5–14.5)
EST. AVERAGE GLUCOSE BLD GHB EST-MCNC: 134 MG/DL
GLOBULIN SER CALC-MCNC: 4.3 G/DL (ref 2–4)
GLUCOSE SERPL-MCNC: 77 MG/DL (ref 65–100)
HBA1C MFR BLD: 6.3 % (ref 4–5.6)
HCT VFR BLD AUTO: 47 % (ref 36.6–50.3)
HDLC SERPL-MCNC: 49 MG/DL
HDLC SERPL: 3 (ref 0–5)
HGB BLD-MCNC: 15.1 G/DL (ref 12.1–17)
IMM GRANULOCYTES # BLD AUTO: 0 K/UL (ref 0–0.04)
IMM GRANULOCYTES NFR BLD AUTO: 0 % (ref 0–0.5)
LDLC SERPL CALC-MCNC: 79.2 MG/DL (ref 0–100)
LYMPHOCYTES # BLD: 3.6 K/UL (ref 0.8–3.5)
LYMPHOCYTES NFR BLD: 49 % (ref 12–49)
MCH RBC QN AUTO: 29.9 PG (ref 26–34)
MCHC RBC AUTO-ENTMCNC: 32.1 G/DL (ref 30–36.5)
MCV RBC AUTO: 93.1 FL (ref 80–99)
MONOCYTES # BLD: 0.5 K/UL (ref 0–1)
MONOCYTES NFR BLD: 7 % (ref 5–13)
NEUTS SEG # BLD: 2.9 K/UL (ref 1.8–8)
NEUTS SEG NFR BLD: 39 % (ref 32–75)
NRBC # BLD: 0 K/UL (ref 0–0.01)
NRBC BLD-RTO: 0 PER 100 WBC
PLATELET # BLD AUTO: 236 K/UL (ref 150–400)
PMV BLD AUTO: 11.1 FL (ref 8.9–12.9)
POTASSIUM SERPL-SCNC: 4.4 MMOL/L (ref 3.5–5.1)
PROT SERPL-MCNC: 8.1 G/DL (ref 6.4–8.2)
RBC # BLD AUTO: 5.05 M/UL (ref 4.1–5.7)
SAMPLES BEING HELD,HOLD: NORMAL
SODIUM SERPL-SCNC: 140 MMOL/L (ref 136–145)
TRIGL SERPL-MCNC: 94 MG/DL (ref ?–150)
URATE SERPL-MCNC: 7.5 MG/DL (ref 3.5–7.2)
VLDLC SERPL CALC-MCNC: 18.8 MG/DL
WBC # BLD AUTO: 7.3 K/UL (ref 4.1–11.1)

## 2023-01-22 DIAGNOSIS — N40.0 BENIGN PROSTATIC HYPERPLASIA WITHOUT LOWER URINARY TRACT SYMPTOMS: ICD-10-CM

## 2023-01-22 DIAGNOSIS — R97.20 ELEVATED PSA: Primary | ICD-10-CM

## 2023-01-22 LAB
% FREE PSA, 480797: 14.8 %
PSA SERPL-MCNC: 6.4 NG/ML (ref 0–4)
PSA, FREE, 480853: 0.95 NG/ML
REFLEX CRITERIA: ABNORMAL

## 2023-01-22 NOTE — PROGRESS NOTES
PSA is elevated. However,  Free PSA and % Free PSA is appropriate. Referral to Urology has been placed. Low likelihood of prostate cancer though.

## 2023-01-22 NOTE — PROGRESS NOTES
HA1C has stayed the same. Continue with current medication regimen. Weight loss will improve this more. BUN is mildly elevated. Do you eat a lot of protein? Please drink plenty of water. All other labs are unremarkable. Please see me in 3 months.

## 2023-02-02 DIAGNOSIS — N40.0 BENIGN PROSTATIC HYPERPLASIA WITHOUT LOWER URINARY TRACT SYMPTOMS: ICD-10-CM

## 2023-02-02 DIAGNOSIS — M1A.09X0 CHRONIC GOUT OF MULTIPLE SITES, UNSPECIFIED CAUSE: Primary | ICD-10-CM

## 2023-05-08 DIAGNOSIS — E11.65 TYPE 2 DIABETES MELLITUS WITH HYPERGLYCEMIA (HCC): ICD-10-CM

## 2023-05-08 RX ORDER — GLIMEPIRIDE 1 MG/1
1 TABLET ORAL
Qty: 30 TABLET | Refills: 0 | Status: SHIPPED | OUTPATIENT
Start: 2023-05-08 | End: 2023-06-05

## 2023-05-11 DIAGNOSIS — E78.2 MIXED HYPERLIPIDEMIA: ICD-10-CM

## 2023-05-11 RX ORDER — ROSUVASTATIN CALCIUM 10 MG/1
10 TABLET, COATED ORAL NIGHTLY
Qty: 30 TABLET | Refills: 0 | Status: SHIPPED | OUTPATIENT
Start: 2023-05-11 | End: 2023-06-05

## 2023-05-13 DIAGNOSIS — I71.9 AORTIC ANEURYSM OF UNSPECIFIED SITE, WITHOUT RUPTURE (HCC): ICD-10-CM

## 2023-05-13 DIAGNOSIS — I10 ESSENTIAL (PRIMARY) HYPERTENSION: ICD-10-CM

## 2023-05-15 RX ORDER — AMLODIPINE BESYLATE 10 MG/1
10 TABLET ORAL DAILY
Qty: 90 TABLET | Refills: 0 | Status: SHIPPED | OUTPATIENT
Start: 2023-05-15

## 2023-05-19 RX ORDER — DUTASTERIDE 0.5 MG/1
0.5 CAPSULE, LIQUID FILLED ORAL DAILY
COMMUNITY
Start: 2023-02-06

## 2023-05-19 RX ORDER — LANCETS 30 GAUGE
EACH MISCELLANEOUS
COMMUNITY
Start: 2022-05-19

## 2023-05-19 RX ORDER — ASPIRIN 81 MG/1
81 TABLET, CHEWABLE ORAL DAILY
COMMUNITY
Start: 2022-05-11

## 2023-05-19 RX ORDER — AMLODIPINE BESYLATE 10 MG/1
10 TABLET ORAL DAILY
COMMUNITY
Start: 2023-02-13

## 2023-06-04 DIAGNOSIS — E78.2 MIXED HYPERLIPIDEMIA: ICD-10-CM

## 2023-06-04 DIAGNOSIS — E11.65 TYPE 2 DIABETES MELLITUS WITH HYPERGLYCEMIA (HCC): ICD-10-CM

## 2023-06-05 RX ORDER — GLIMEPIRIDE 1 MG/1
1 TABLET ORAL
Qty: 30 TABLET | Refills: 0 | Status: SHIPPED | OUTPATIENT
Start: 2023-06-05

## 2023-06-05 RX ORDER — ROSUVASTATIN CALCIUM 10 MG/1
10 TABLET, COATED ORAL NIGHTLY
Qty: 30 TABLET | Refills: 0 | Status: SHIPPED | OUTPATIENT
Start: 2023-06-05

## 2023-06-05 NOTE — TELEPHONE ENCOUNTER
PCP: DANIELA Ku NP    Last Visit 1/19/2023   No future appointments. Requested Prescriptions     Pending Prescriptions Disp Refills    glimepiride (AMARYL) 1 MG tablet [Pharmacy Med Name: GLIMEPIRIDE 1 MG TABLET] 30 tablet 0     Sig: TAKE 1 TABLET BY MOUTH EVERY MORNING (BEFORE BREAKFAST) NEEDS APPOINTMENT WITH PROVIDER. rosuvastatin (CRESTOR) 10 MG tablet [Pharmacy Med Name: ROSUVASTATIN CALCIUM 10 MG TAB] 30 tablet 0     Sig: TAKE 1 TABLET BY MOUTH AT BEDTIME NEEDS APPOINTMENT WITH PROVIDER.          Other Comments: Last Refill   Glimepiride 05/08/23  Crestor 05/11/23

## 2023-07-05 DIAGNOSIS — E78.5 HYPERLIPIDEMIA LDL GOAL <70: Primary | ICD-10-CM

## 2023-07-05 DIAGNOSIS — E78.2 MIXED HYPERLIPIDEMIA: ICD-10-CM

## 2023-07-05 DIAGNOSIS — E11.65 TYPE 2 DIABETES MELLITUS WITH HYPERGLYCEMIA (HCC): ICD-10-CM

## 2023-07-05 RX ORDER — ROSUVASTATIN CALCIUM 10 MG/1
10 TABLET, COATED ORAL NIGHTLY
Qty: 30 TABLET | Refills: 0 | Status: SHIPPED | OUTPATIENT
Start: 2023-07-05 | End: 2023-08-03

## 2023-07-05 RX ORDER — GLIMEPIRIDE 1 MG/1
1 TABLET ORAL
Qty: 30 TABLET | Refills: 0 | Status: SHIPPED | OUTPATIENT
Start: 2023-07-05 | End: 2023-08-04

## 2023-07-05 NOTE — TELEPHONE ENCOUNTER
PCP: DANIELA Jennings - NP    Last Visit Visit date not found   No future appointments. Requested Prescriptions     Pending Prescriptions Disp Refills    rosuvastatin (CRESTOR) 10 MG tablet [Pharmacy Med Name: ROSUVASTATIN CALCIUM 10 MG TAB] 90 tablet 0     Sig: TAKE 1 TABLET BY MOUTH AT BEDTIME NEEDS APPOINTMENT WITH PROVIDER. glimepiride (AMARYL) 1 MG tablet [Pharmacy Med Name: GLIMEPIRIDE 1 MG TABLET] 90 tablet 0     Sig: TAKE 1 TABLET BY MOUTH EVERY MORNING (BEFORE BREAKFAST) NEEDS APPOINTMENT WITH PROVIDER.          Other Comments: Last Refill   06/05/23

## 2023-08-03 DIAGNOSIS — E78.5 HYPERLIPIDEMIA LDL GOAL <70: ICD-10-CM

## 2023-08-03 RX ORDER — ROSUVASTATIN CALCIUM 10 MG/1
10 TABLET, COATED ORAL NIGHTLY
Qty: 90 TABLET | Refills: 0 | Status: SHIPPED | OUTPATIENT
Start: 2023-08-03

## 2023-08-03 NOTE — TELEPHONE ENCOUNTER
PCP: DANIELA Jennings - NP    Last Visit 1/19/2023   No future appointments. Requested Prescriptions     Pending Prescriptions Disp Refills    rosuvastatin (CRESTOR) 10 MG tablet [Pharmacy Med Name: ROSUVASTATIN CALCIUM 10 MG TAB] 30 tablet 0     Sig: TAKE 1 TABLET BY MOUTH AT BEDTIME NEEDS APPOINTMENT WITH PROVIDER. LAST REFILL THAT WILL BE GRANTED.          Other Comments: Last Refill 07/05/2023

## 2023-08-04 DIAGNOSIS — E11.65 TYPE 2 DIABETES MELLITUS WITH HYPERGLYCEMIA (HCC): ICD-10-CM

## 2023-08-04 RX ORDER — GLIMEPIRIDE 1 MG/1
1 TABLET ORAL
Qty: 90 TABLET | Refills: 0 | Status: SHIPPED | OUTPATIENT
Start: 2023-08-04

## 2023-08-04 NOTE — TELEPHONE ENCOUNTER
PCP: DANIELA Dela Cruz NP    Last Visit 1/19/2023   No future appointments. Requested Prescriptions     Pending Prescriptions Disp Refills    glimepiride (AMARYL) 1 MG tablet [Pharmacy Med Name: GLIMEPIRIDE 1 MG TABLET] 30 tablet 0     Sig: TAKE 1 TABLET BY MOUTH EVERY MORNING (BEFORE BREAKFAST) NEEDS APPOINTMENT WITH PROVIDER. LAST REFILL THAT WILL BE GRANTED.          Other Comments: Last Refill 07/05/2023

## 2023-08-13 DIAGNOSIS — I71.9 AORTIC ANEURYSM OF UNSPECIFIED SITE, WITHOUT RUPTURE (HCC): ICD-10-CM

## 2023-08-13 DIAGNOSIS — I10 ESSENTIAL (PRIMARY) HYPERTENSION: ICD-10-CM

## 2023-08-16 RX ORDER — AMLODIPINE BESYLATE 10 MG/1
10 TABLET ORAL DAILY
Qty: 90 TABLET | Refills: 0 | Status: SHIPPED | OUTPATIENT
Start: 2023-08-16

## 2023-08-16 NOTE — TELEPHONE ENCOUNTER
PCP: DANIELA Winter - NP    Last Visit 1/19/2023   No future appointments. Requested Prescriptions     Pending Prescriptions Disp Refills    amLODIPine (NORVASC) 10 MG tablet [Pharmacy Med Name: AMLODIPINE BESYLATE 10 MG TAB] 90 tablet 0     Sig: TAKE 1 TABLET BY MOUTH DAILY NEEDS APPOINTMENT WITH PROVIDER.          Other Comments: Last Refill   05/15/23

## 2023-09-12 ENCOUNTER — HOSPITAL ENCOUNTER (EMERGENCY)
Facility: HOSPITAL | Age: 68
Discharge: HOME | End: 2023-09-12
Attending: EMERGENCY MEDICINE
Payer: MEDICARE

## 2023-09-12 VITALS
RESPIRATION RATE: 24 BRPM | BODY MASS INDEX: 33.83 KG/M2 | WEIGHT: 228.4 LBS | OXYGEN SATURATION: 96 % | DIASTOLIC BLOOD PRESSURE: 83 MMHG | TEMPERATURE: 98.7 F | SYSTOLIC BLOOD PRESSURE: 171 MMHG | HEIGHT: 69 IN | HEART RATE: 85 BPM

## 2023-09-12 DIAGNOSIS — J02.9 ACUTE PHARYNGITIS, UNSPECIFIED ETIOLOGY: Primary | ICD-10-CM

## 2023-09-12 LAB — S PYO AG THROAT QL: NEGATIVE

## 2023-09-12 PROCEDURE — 87070 CULTURE OTHR SPECIMN AEROBIC: CPT

## 2023-09-12 PROCEDURE — 87147 CULTURE TYPE IMMUNOLOGIC: CPT

## 2023-09-12 PROCEDURE — 87880 STREP A ASSAY W/OPTIC: CPT

## 2023-09-12 PROCEDURE — 99283 EMERGENCY DEPT VISIT LOW MDM: CPT

## 2023-09-12 RX ORDER — LIDOCAINE HYDROCHLORIDE 20 MG/ML
15 SOLUTION OROPHARYNGEAL PRN
Qty: 100 ML | Refills: 0 | Status: SHIPPED | OUTPATIENT
Start: 2023-09-12

## 2023-09-12 ASSESSMENT — PAIN DESCRIPTION - ORIENTATION: ORIENTATION: ANTERIOR

## 2023-09-12 ASSESSMENT — PAIN DESCRIPTION - PAIN TYPE: TYPE: ACUTE PAIN

## 2023-09-12 ASSESSMENT — PAIN - FUNCTIONAL ASSESSMENT
PAIN_FUNCTIONAL_ASSESSMENT: 0-10
PAIN_FUNCTIONAL_ASSESSMENT: ACTIVITIES ARE NOT PREVENTED

## 2023-09-12 ASSESSMENT — PAIN DESCRIPTION - FREQUENCY: FREQUENCY: CONTINUOUS

## 2023-09-12 ASSESSMENT — ENCOUNTER SYMPTOMS
EYE PAIN: 0
SORE THROAT: 1
NAUSEA: 0
SHORTNESS OF BREATH: 0
VOMITING: 0
DIARRHEA: 0
ABDOMINAL PAIN: 0
COUGH: 0

## 2023-09-12 ASSESSMENT — PAIN DESCRIPTION - LOCATION: LOCATION: THROAT

## 2023-09-12 ASSESSMENT — PAIN DESCRIPTION - DESCRIPTORS: DESCRIPTORS: ACHING

## 2023-09-12 ASSESSMENT — PAIN DESCRIPTION - ONSET: ONSET: ON-GOING

## 2023-09-12 ASSESSMENT — PAIN SCALES - GENERAL: PAINLEVEL_OUTOF10: 5

## 2023-09-12 NOTE — ED PROVIDER NOTES
triage and patient afebrile. Physical exam notable for oropharyngeal erythema and swelling but ears normal and lungs clear to auscultation bilaterally. POC strep negative. Throat culture sent. Until culture results return we will treat as viral pharyngitis. Patient did not want a thing for pain while in ED when offered. Also offered viral swab such as COVID and flu but patient refused at this time reporting he will do an at home COVID test. Results and findings have been communicated and explained thoroughly to patient and family members. Patient has been educated on strict return precautions as well as instructions for conservative care and follow-up. Patient will be discharged with prescription for viscous lidocaine and has been given opportunity to ask questions about this new medication. Patient verbalizes understanding and no socioeconomic barriers to care were identified during this visit. Patient expresses no further concerns at this time and will be discharged with AVS and education paperwork. Amount and/or Complexity of Data Reviewed  Labs: ordered. Risk  Prescription drug management. REASSESSMENT            CONSULTS:  None    PROCEDURES:  Unless otherwise noted below, none     Procedures      FINAL IMPRESSION      1.  Acute pharyngitis, unspecified etiology          DISPOSITION/PLAN   DISPOSITION Decision To Discharge 09/12/2023 12:29:59 PM      PATIENT REFERRED TO:  DANIELA Winter NP  [address] [place]  [address] [facility]  [phone]    Schedule an appointment as soon as possible for a visit   As follow up in next week    [address] [place]  [phone]  Go to   If symptoms worsen or change      DISCHARGE MEDICATIONS:  Discharge Medication List as of 9/12/2023 12:45 PM        START taking these medications    Details   lidocaine viscous hcl (XYLOCAINE) 2 % SOLN solution Take 15 mLs by mouth as needed for Irritation,

## 2023-09-14 LAB
BACTERIA SPEC CULT: ABNORMAL
BACTERIA SPEC CULT: ABNORMAL
SERVICE CMNT-IMP: ABNORMAL

## 2023-11-02 DIAGNOSIS — E11.65 TYPE 2 DIABETES MELLITUS WITH HYPERGLYCEMIA (HCC): ICD-10-CM

## 2023-11-02 RX ORDER — GLIMEPIRIDE 1 MG/1
1 TABLET ORAL
Qty: 90 TABLET | Refills: 0 | OUTPATIENT
Start: 2023-11-02

## 2023-11-02 NOTE — TELEPHONE ENCOUNTER
PCP: Angel Enciso APRN - NP    Last Visit 1/19/2023   No future appointments.    Requested Prescriptions     Pending Prescriptions Disp Refills    glimepiride (AMARYL) 1 MG tablet [Pharmacy Med Name: GLIMEPIRIDE 1 MG TABLET] 90 tablet 0     Sig: TAKE 1 TABLET BY MOUTH EVERY MORNING (BEFORE BREAKFAST) NEEDS APPOINTMENT WITH PROVIDER. LAST REFILL THAT WILL BE GRANTED.         Other Comments: Last Refill 08/04/2023

## 2023-11-08 DIAGNOSIS — I71.9 AORTIC ANEURYSM OF UNSPECIFIED SITE, WITHOUT RUPTURE (HCC): ICD-10-CM

## 2023-11-08 DIAGNOSIS — I10 ESSENTIAL (PRIMARY) HYPERTENSION: ICD-10-CM

## 2023-11-08 RX ORDER — AMLODIPINE BESYLATE 10 MG/1
10 TABLET ORAL DAILY
Qty: 14 TABLET | Refills: 0 | Status: SHIPPED | OUTPATIENT
Start: 2023-11-08 | End: 2023-11-20 | Stop reason: SDUPTHER

## 2023-11-10 DIAGNOSIS — E78.5 HYPERLIPIDEMIA LDL GOAL <70: ICD-10-CM

## 2023-11-10 RX ORDER — ROSUVASTATIN CALCIUM 10 MG/1
10 TABLET, COATED ORAL NIGHTLY
Qty: 30 TABLET | Refills: 0 | Status: SHIPPED | OUTPATIENT
Start: 2023-11-10 | End: 2023-11-21

## 2023-11-10 NOTE — TELEPHONE ENCOUNTER
PCP: Angel Enciso APRN - NP    Last Visit 1/19/2023   No future appointments.    Requested Prescriptions     Pending Prescriptions Disp Refills    rosuvastatin (CRESTOR) 10 MG tablet [Pharmacy Med Name: ROSUVASTATIN CALCIUM 10 MG TAB] 90 tablet 0     Sig: TAKE 1 TABLET BY MOUTH AT BEDTIME NEEDS APPOINTMENT WITH PROVIDER. LAST REFILL THAT WILL BE GRANTED.         Other Comments: Last Refill   08/03/23

## 2023-11-14 DIAGNOSIS — E11.65 TYPE 2 DIABETES MELLITUS WITH HYPERGLYCEMIA (HCC): ICD-10-CM

## 2023-11-14 RX ORDER — GLIMEPIRIDE 1 MG/1
1 TABLET ORAL
Qty: 90 TABLET | Refills: 0 | OUTPATIENT
Start: 2023-11-14

## 2023-11-16 ENCOUNTER — TELEPHONE (OUTPATIENT)
Dept: PRIMARY CARE CLINIC | Facility: CLINIC | Age: 68
End: 2023-11-16

## 2023-11-16 NOTE — TELEPHONE ENCOUNTER
Patient has an upcoming appointment with Kehinde but needs his glimipiride refilled, he is completely out.

## 2023-11-17 DIAGNOSIS — E11.65 TYPE 2 DIABETES MELLITUS WITH HYPERGLYCEMIA (HCC): ICD-10-CM

## 2023-11-17 NOTE — TELEPHONE ENCOUNTER
PCP: Angel Enciso APRN - NP    Last Visit 1/19/2023   Future Appointments   Date Time Provider Department Center   11/20/2023  3:20 PM Angel Enciso APRN - NP [facility]   12/1/2023  9:20 AM Anegl Enciso APRN - NP [facility]       Requested Prescriptions     Pending Prescriptions Disp Refills    glimepiride (AMARYL) 1 MG tablet 90 tablet 0     Sig: Take 1 tablet by mouth every morning (before breakfast) NEEDS APPOINTMENT WITH PROVIDER. LAST REFILL THAT WILL BE GRANTED.         Other Comments: Last Refill   08/04/23

## 2023-11-19 RX ORDER — GLIMEPIRIDE 1 MG/1
1 TABLET ORAL
Qty: 30 TABLET | Refills: 0 | Status: SHIPPED | OUTPATIENT
Start: 2023-11-19 | End: 2023-11-20 | Stop reason: SDUPTHER

## 2023-11-20 ENCOUNTER — OFFICE VISIT (OUTPATIENT)
Dept: PRIMARY CARE CLINIC | Facility: CLINIC | Age: 68
End: 2023-11-20
Payer: MEDICARE

## 2023-11-20 VITALS
HEART RATE: 88 BPM | TEMPERATURE: 97.3 F | HEIGHT: 69 IN | BODY MASS INDEX: 33.62 KG/M2 | DIASTOLIC BLOOD PRESSURE: 80 MMHG | WEIGHT: 227 LBS | RESPIRATION RATE: 16 BRPM | SYSTOLIC BLOOD PRESSURE: 140 MMHG | OXYGEN SATURATION: 98 %

## 2023-11-20 DIAGNOSIS — E11.9 TYPE 2 DIABETES MELLITUS WITHOUT COMPLICATION, WITHOUT LONG-TERM CURRENT USE OF INSULIN (HCC): ICD-10-CM

## 2023-11-20 DIAGNOSIS — M21.612 BUNION OF LEFT FOOT: ICD-10-CM

## 2023-11-20 DIAGNOSIS — E78.5 HYPERLIPIDEMIA LDL GOAL <70: ICD-10-CM

## 2023-11-20 DIAGNOSIS — E11.9 ENCOUNTER FOR DIABETIC FOOT EXAM (HCC): ICD-10-CM

## 2023-11-20 DIAGNOSIS — M1A.1791 CHRONIC LEAD-INDUCED GOUT INVOLVING TOE WITH TOPHUS, UNSPECIFIED LATERALITY, SEQUELA: ICD-10-CM

## 2023-11-20 DIAGNOSIS — Z12.12 SCREENING FOR COLORECTAL CANCER: ICD-10-CM

## 2023-11-20 DIAGNOSIS — E66.09 CLASS 1 OBESITY DUE TO EXCESS CALORIES WITH SERIOUS COMORBIDITY AND BODY MASS INDEX (BMI) OF 33.0 TO 33.9 IN ADULT: ICD-10-CM

## 2023-11-20 DIAGNOSIS — H91.93 BILATERAL HEARING LOSS, UNSPECIFIED HEARING LOSS TYPE: ICD-10-CM

## 2023-11-20 DIAGNOSIS — I10 ESSENTIAL HYPERTENSION: ICD-10-CM

## 2023-11-20 DIAGNOSIS — T56.0X1S CHRONIC LEAD-INDUCED GOUT INVOLVING TOE WITH TOPHUS, UNSPECIFIED LATERALITY, SEQUELA: ICD-10-CM

## 2023-11-20 DIAGNOSIS — E11.9 TYPE 2 DIABETES MELLITUS WITHOUT COMPLICATION, WITHOUT LONG-TERM CURRENT USE OF INSULIN (HCC): Primary | ICD-10-CM

## 2023-11-20 DIAGNOSIS — N40.0 BENIGN PROSTATIC HYPERPLASIA WITHOUT LOWER URINARY TRACT SYMPTOMS: ICD-10-CM

## 2023-11-20 DIAGNOSIS — H61.23 EXCESSIVE EAR WAX, BILATERAL: ICD-10-CM

## 2023-11-20 DIAGNOSIS — Z12.11 SCREENING FOR COLORECTAL CANCER: ICD-10-CM

## 2023-11-20 DIAGNOSIS — M21.611 BUNION OF RIGHT FOOT: ICD-10-CM

## 2023-11-20 LAB
ALBUMIN SERPL-MCNC: 4 G/DL (ref 3.5–5)
ALBUMIN/GLOB SERPL: 0.9 (ref 1.1–2.2)
ALP SERPL-CCNC: 67 U/L (ref 45–117)
ALT SERPL-CCNC: 38 U/L (ref 12–78)
ANION GAP SERPL CALC-SCNC: 3 MMOL/L (ref 5–15)
AST SERPL-CCNC: 24 U/L (ref 15–37)
BILIRUB SERPL-MCNC: 0.4 MG/DL (ref 0.2–1)
BUN SERPL-MCNC: 17 MG/DL (ref 6–20)
BUN/CREAT SERPL: 15 (ref 12–20)
CALCIUM SERPL-MCNC: 8.8 MG/DL (ref 8.5–10.1)
CHLORIDE SERPL-SCNC: 106 MMOL/L (ref 97–108)
CHOLEST SERPL-MCNC: 177 MG/DL
CO2 SERPL-SCNC: 31 MMOL/L (ref 21–32)
CREAT SERPL-MCNC: 1.14 MG/DL (ref 0.7–1.3)
CREAT UR-MCNC: 164 MG/DL
EST. AVERAGE GLUCOSE BLD GHB EST-MCNC: 134 MG/DL
GLOBULIN SER CALC-MCNC: 4.7 G/DL (ref 2–4)
GLUCOSE SERPL-MCNC: 103 MG/DL (ref 65–100)
HBA1C MFR BLD: 6.3 % (ref 4–5.6)
HDLC SERPL-MCNC: 48 MG/DL
HDLC SERPL: 3.7 (ref 0–5)
LDLC SERPL CALC-MCNC: 114.2 MG/DL (ref 0–100)
MICROALBUMIN UR-MCNC: 0.98 MG/DL
MICROALBUMIN/CREAT UR-RTO: 6 MG/G (ref 0–30)
POTASSIUM SERPL-SCNC: 4.3 MMOL/L (ref 3.5–5.1)
PROT SERPL-MCNC: 8.7 G/DL (ref 6.4–8.2)
SODIUM SERPL-SCNC: 140 MMOL/L (ref 136–145)
TRIGL SERPL-MCNC: 74 MG/DL
URATE SERPL-MCNC: 8.3 MG/DL (ref 3.5–7.2)
VLDLC SERPL CALC-MCNC: 14.8 MG/DL

## 2023-11-20 PROCEDURE — 1123F ACP DISCUSS/DSCN MKR DOCD: CPT | Performed by: NURSE PRACTITIONER

## 2023-11-20 PROCEDURE — 99214 OFFICE O/P EST MOD 30 MIN: CPT | Performed by: NURSE PRACTITIONER

## 2023-11-20 PROCEDURE — 3078F DIAST BP <80 MM HG: CPT | Performed by: NURSE PRACTITIONER

## 2023-11-20 PROCEDURE — 3044F HG A1C LEVEL LT 7.0%: CPT | Performed by: NURSE PRACTITIONER

## 2023-11-20 PROCEDURE — 3077F SYST BP >= 140 MM HG: CPT | Performed by: NURSE PRACTITIONER

## 2023-11-20 RX ORDER — AMLODIPINE BESYLATE 10 MG/1
10 TABLET ORAL DAILY
Qty: 90 TABLET | Refills: 0 | Status: SHIPPED | OUTPATIENT
Start: 2023-11-20

## 2023-11-20 RX ORDER — GLIMEPIRIDE 1 MG/1
1 TABLET ORAL
Qty: 90 TABLET | Refills: 0 | Status: SHIPPED | OUTPATIENT
Start: 2023-11-20

## 2023-11-20 RX ORDER — BENAZEPRIL HYDROCHLORIDE AND HYDROCHLOROTHIAZIDE 20; 12.5 MG/1; MG/1
1 TABLET ORAL DAILY
Qty: 30 TABLET | Refills: 1 | Status: SHIPPED | OUTPATIENT
Start: 2023-11-20

## 2023-11-20 ASSESSMENT — ENCOUNTER SYMPTOMS
RHINORRHEA: 0
SORE THROAT: 0
VOICE CHANGE: 0
SINUS PRESSURE: 0
SHORTNESS OF BREATH: 0
TROUBLE SWALLOWING: 0
SINUS PAIN: 0
WHEEZING: 0

## 2023-11-20 NOTE — PROGRESS NOTES
[facility]   [address] [place] [address] [address] Kirsty Wyatt  P: [phone]  F: [phone]    SUBJECTIVE     HPI:     Emerson Jasmine is a 80 y.o. male who is seen in the clinic for   Chief Complaint   Patient presents with    Medication Refill    Referral - General     Pt would a referral to ENT- pt states that he thinks that he may have a lot of ear wax- pt states that a while a go the right ear on and off while cleaning his ears- pt states that some times he has trouble hearing       Pt would also like a referral to [address] [place] [address] Only     Pt is fasting       The patient presents to the office today for the management of co-morbidities. On 1/19, referred to Podiatry to the management of his Right Foot Bunions. Has not made an appointment. Hx of DM II. HA1C was 6.3 on 1/19. Currently rx'd Glimepiride 1 mg QD. Hx of Gout. Uric Acid level was 7.5 on 1/19. Denies any recent flares. Currently rx'd prn Indomethacin 50 mg TID. Hx of HTN. Currently rx'd Amlodipine 10 mg QD, Dutasteride 0.5 mg QHS, and Benzapril 10 mg - HCTZ 12.5 mg QD. Hx of HLD. LDL was 79 on 1/19. Currently rx'd Rosuvastatin 10 mg QHS. Hx of BPH that is managed by Dr. Leilani Garcia (Urology). Endorses worsening bilateral ear wax. Endorses worsening bilateral hearing loss. Has a hx of Frequent Sinus Infections. Would like Colonoscopy. Patient Active Problem List   Diagnosis    Essential hypertension    Benign prostatic hyperplasia with nocturia    Obesity (BMI 30.0-34. 9)    Arthritis of lumbar spine    New onset type 2 diabetes mellitus ([address])    Primary osteoarthritis of right knee    HTN (hypertension)    Elevated troponin I level    Cardiomyopathy due to hypertension, without heart failure ([address])    Aortic aneurysm without rupture, unspecified portion of aorta [facility])        Past Medical History:   Diagnosis Date    Diabetes ([address])     Hypertension      Past Surgical History:   Procedure Laterality

## 2023-11-21 DIAGNOSIS — E78.5 HYPERLIPIDEMIA LDL GOAL <70: ICD-10-CM

## 2023-11-21 RX ORDER — ROSUVASTATIN CALCIUM 20 MG/1
20 TABLET, COATED ORAL
Qty: 30 TABLET | Refills: 1 | Status: SHIPPED | OUTPATIENT
Start: 2023-11-21

## 2023-11-21 NOTE — RESULT ENCOUNTER NOTE
HA1C is unchanged. Continue to modify diet and exercise to loose weight.     Lipid Panel has worsened. Are you taking your Crestor Daily? I have increased the dose to 20 mg.     Uric Acid is mildly elevated. Please limit your meat/alcohol intake.     All other labs are unremarkable.     Please see me in 3 months for a follow up.

## 2023-12-14 DIAGNOSIS — E78.5 HYPERLIPIDEMIA LDL GOAL <70: ICD-10-CM

## 2023-12-14 RX ORDER — ROSUVASTATIN CALCIUM 20 MG/1
20 TABLET, COATED ORAL
Qty: 30 TABLET | Refills: 1 | Status: SHIPPED | OUTPATIENT
Start: 2023-12-14

## 2024-02-18 DIAGNOSIS — E11.9 TYPE 2 DIABETES MELLITUS WITHOUT COMPLICATION, WITHOUT LONG-TERM CURRENT USE OF INSULIN (HCC): ICD-10-CM

## 2024-02-18 DIAGNOSIS — I10 ESSENTIAL HYPERTENSION: ICD-10-CM

## 2024-02-18 RX ORDER — AMLODIPINE BESYLATE 10 MG/1
10 TABLET ORAL DAILY
Qty: 30 TABLET | Refills: 0 | Status: SHIPPED | OUTPATIENT
Start: 2024-02-18

## 2024-02-18 RX ORDER — GLIMEPIRIDE 1 MG/1
1 TABLET ORAL
Qty: 30 TABLET | Refills: 0 | Status: SHIPPED | OUTPATIENT
Start: 2024-02-18

## 2024-03-02 ENCOUNTER — HOSPITAL ENCOUNTER (EMERGENCY)
Facility: HOSPITAL | Age: 69
Discharge: HOME | End: 2024-03-02
Attending: STUDENT IN AN ORGANIZED HEALTH CARE EDUCATION/TRAINING PROGRAM
Payer: MEDICARE

## 2024-03-02 ENCOUNTER — APPOINTMENT (OUTPATIENT)
Facility: HOSPITAL | Age: 69
End: 2024-03-02
Payer: MEDICARE

## 2024-03-02 VITALS
DIASTOLIC BLOOD PRESSURE: 86 MMHG | RESPIRATION RATE: 18 BRPM | WEIGHT: 226.63 LBS | OXYGEN SATURATION: 98 % | SYSTOLIC BLOOD PRESSURE: 185 MMHG | BODY MASS INDEX: 33.47 KG/M2 | HEART RATE: 73 BPM | TEMPERATURE: 98.6 F

## 2024-03-02 DIAGNOSIS — M10.9 ACUTE GOUT INVOLVING TOE OF RIGHT FOOT, UNSPECIFIED CAUSE: Primary | ICD-10-CM

## 2024-03-02 PROCEDURE — 6360000002 HC RX W HCPCS: Performed by: EMERGENCY MEDICINE

## 2024-03-02 PROCEDURE — 96372 THER/PROPH/DIAG INJ SC/IM: CPT

## 2024-03-02 PROCEDURE — 73630 X-RAY EXAM OF FOOT: CPT

## 2024-03-02 PROCEDURE — 99284 EMERGENCY DEPT VISIT MOD MDM: CPT

## 2024-03-02 RX ORDER — INDOMETHACIN 50 MG/1
50 CAPSULE ORAL
Qty: 15 CAPSULE | Refills: 0 | Status: SHIPPED | OUTPATIENT
Start: 2024-03-02 | End: 2024-03-07

## 2024-03-02 RX ORDER — KETOROLAC TROMETHAMINE 30 MG/ML
30 INJECTION, SOLUTION INTRAMUSCULAR; INTRAVENOUS
Status: COMPLETED | OUTPATIENT
Start: 2024-03-02 | End: 2024-03-02

## 2024-03-02 RX ADMIN — KETOROLAC TROMETHAMINE 30 MG: 30 INJECTION, SOLUTION INTRAMUSCULAR; INTRAVENOUS at 13:30

## 2024-03-02 ASSESSMENT — ENCOUNTER SYMPTOMS
SORE THROAT: 0
ABDOMINAL PAIN: 0
VOMITING: 0
COLOR CHANGE: 0
COUGH: 0
BACK PAIN: 0
SHORTNESS OF BREATH: 0
DIARRHEA: 0

## 2024-03-02 ASSESSMENT — PAIN DESCRIPTION - ORIENTATION
ORIENTATION: RIGHT
ORIENTATION: RIGHT

## 2024-03-02 ASSESSMENT — PAIN - FUNCTIONAL ASSESSMENT
PAIN_FUNCTIONAL_ASSESSMENT: ACTIVITIES ARE NOT PREVENTED
PAIN_FUNCTIONAL_ASSESSMENT: 0-10

## 2024-03-02 ASSESSMENT — PAIN DESCRIPTION - LOCATION
LOCATION: FOOT
LOCATION: FOOT

## 2024-03-02 ASSESSMENT — PAIN SCALES - GENERAL
PAINLEVEL_OUTOF10: 9
PAINLEVEL_OUTOF10: 9

## 2024-03-02 ASSESSMENT — PAIN DESCRIPTION - DESCRIPTORS
DESCRIPTORS: ACHING
DESCRIPTORS: ACHING

## 2024-03-02 NOTE — DISCHARGE INSTRUCTIONS
Your xray shows that you have gout in the big toe, which is likely the cause of your worsening pain and swelling. This is treated with strong anti-inflammatories, prescription has been sent to your pharmacy.     Follow-up on gout with your PCP, they can help prescribe medications that can help with any future flare-ups     Follow-up with podiatry to discuss your bunion surgery evaluation

## 2024-03-02 NOTE — ED TRIAGE NOTES
Patient arrives ambulatory to triage with c/o right big toe pain that started yesterday. He has a bunion that he is scheduled for removal

## 2024-03-02 NOTE — ED PROVIDER NOTES
[facility] EMERGENCY DEP  EMERGENCY DEPARTMENT ENCOUNTER      Pt Name: Jose Reynaga  MRN: [id number]  Birthdate 1955  Date of evaluation: 3/2/2024  Provider: DENISA Gr    CHIEF COMPLAINT       Chief Complaint   Patient presents with    Foot Pain         HISTORY OF PRESENT ILLNESS   (Location/Symptom, Timing/Onset, Context/Setting, Quality, Duration, Modifying Factors, Severity)  Note limiting factors.   Jose Reynaga is a 68 y.o. male with past medical history as listed below who presents to the emergency department for evaluation of right big toe pain which started yesterday.  He states that he has a bunion to this foot and was just referred to podiatry to have bunion surgery.  He feels like this is the cause of his pain secondary to walking a lot recently.  He denies any falls or trauma.  Does have a history of gout.      Nursing Notes were reviewed.    REVIEW OF SYSTEMS    (2-9 systems for level 4, 10 or more for level 5)     Review of Systems   Constitutional:  Negative for fever.   HENT:  Negative for congestion and sore throat.    Eyes:  Negative for visual disturbance.   Respiratory:  Negative for cough and shortness of breath.    Cardiovascular:  Negative for chest pain.   Gastrointestinal:  Negative for abdominal pain, diarrhea and vomiting.   Genitourinary:  Negative for dysuria.   Musculoskeletal:  Positive for arthralgias. Negative for back pain and neck pain.   Skin:  Negative for color change.   Neurological:  Negative for dizziness and headaches.   Psychiatric/Behavioral:  Negative for confusion.        Except as noted above the remainder of the review of systems was reviewed and negative.       PAST MEDICAL HISTORY     Past Medical History:   Diagnosis Date    Diabetes (HCC)     Hypertension        SURGICAL HISTORY       Past Surgical History:   Procedure Laterality Date    ORTHOPEDIC SURGERY      Achilles Tendon/ left foot        CURRENT MEDICATIONS       Previous Medications     capsule, R-0Normal           Return to the ED if worse    (Please note that portions of this note were completed with a voice recognition program.  Efforts were made to edit the dictations but occasionally words are mis-transcribed.)    DENISA Gr (electronically signed)      Procedures          Crystal Ferrari PA  03/02/24 1454

## 2024-03-14 DIAGNOSIS — E11.9 TYPE 2 DIABETES MELLITUS WITHOUT COMPLICATION, WITHOUT LONG-TERM CURRENT USE OF INSULIN (HCC): ICD-10-CM

## 2024-03-14 DIAGNOSIS — I10 ESSENTIAL HYPERTENSION: ICD-10-CM

## 2024-03-14 RX ORDER — AMLODIPINE BESYLATE 10 MG/1
10 TABLET ORAL DAILY
Qty: 28 TABLET | Refills: 0 | Status: SHIPPED | OUTPATIENT
Start: 2024-03-14

## 2024-03-14 RX ORDER — GLIMEPIRIDE 1 MG/1
1 TABLET ORAL
Qty: 28 TABLET | Refills: 0 | Status: SHIPPED | OUTPATIENT
Start: 2024-03-14

## 2024-04-20 DIAGNOSIS — E11.9 TYPE 2 DIABETES MELLITUS WITHOUT COMPLICATION, WITHOUT LONG-TERM CURRENT USE OF INSULIN (HCC): ICD-10-CM

## 2024-04-20 DIAGNOSIS — I10 ESSENTIAL HYPERTENSION: ICD-10-CM

## 2024-04-22 RX ORDER — AMLODIPINE BESYLATE 10 MG/1
10 TABLET ORAL DAILY
Qty: 28 TABLET | Refills: 0 | Status: SHIPPED | OUTPATIENT
Start: 2024-04-22

## 2024-04-22 RX ORDER — GLIMEPIRIDE 1 MG/1
1 TABLET ORAL
Qty: 28 TABLET | Refills: 0 | Status: SHIPPED | OUTPATIENT
Start: 2024-04-22

## 2024-04-30 ENCOUNTER — COMMUNITY OUTREACH (OUTPATIENT)
Dept: PRIMARY CARE CLINIC | Facility: CLINIC | Age: 69
End: 2024-04-30

## 2024-05-20 ENCOUNTER — TELEPHONE (OUTPATIENT)
Dept: PRIMARY CARE CLINIC | Facility: CLINIC | Age: 69
End: 2024-05-20

## 2024-05-20 NOTE — TELEPHONE ENCOUNTER
----- Message from Luke Knowles sent at 5/20/2024  8:23 AM EDT -----  Subject: Message to Provider    QUESTIONS  Information for Provider? Patient is wanting to schedule a physical at the   practice. His wife is calling and wanting to know if anyone at the   practice will take him on since his PCP is no longer there.   ---------------------------------------------------------------------------  --------------  CALL BACK INFO  [phone]; OK to leave message on voicemail  ---------------------------------------------------------------------------  --------------  SCRIPT ANSWERS  Relationship to Patient? Spouse/Partner  Representative Name? Paz  Is the representative on the Communication Release of Information (FABRIZIO)   form in Epic? Yes

## 2024-05-22 ENCOUNTER — TELEPHONE (OUTPATIENT)
Dept: PRIMARY CARE CLINIC | Facility: CLINIC | Age: 69
End: 2024-05-22

## 2024-05-22 DIAGNOSIS — E11.9 TYPE 2 DIABETES MELLITUS WITHOUT COMPLICATION, WITHOUT LONG-TERM CURRENT USE OF INSULIN (HCC): ICD-10-CM

## 2024-05-22 DIAGNOSIS — I10 ESSENTIAL HYPERTENSION: ICD-10-CM

## 2024-05-22 RX ORDER — AMLODIPINE BESYLATE 10 MG/1
10 TABLET ORAL DAILY
Qty: 90 TABLET | Refills: 0 | Status: SHIPPED | OUTPATIENT
Start: 2024-05-22

## 2024-05-22 RX ORDER — GLIMEPIRIDE 1 MG/1
1 TABLET ORAL
Qty: 90 TABLET | Refills: 0 | Status: SHIPPED | OUTPATIENT
Start: 2024-05-22

## 2024-05-22 NOTE — TELEPHONE ENCOUNTER
Patients wife said the patient needs a refill for   Amlodipine 10 mg  Glimepiride 1 mg    Patient is out of medicine  Patient has an NTP appointment with Dr. Tate on 8/22/2024  Patients wife said his insurance should pay for a 90 day supply      [id number] - [place], [place] - [address] -  [phone] -  [phone] [[id number]]

## 2024-07-30 ENCOUNTER — HOSPITAL ENCOUNTER (EMERGENCY)
Facility: HOSPITAL | Age: 69
Discharge: HOME | End: 2024-07-30
Attending: STUDENT IN AN ORGANIZED HEALTH CARE EDUCATION/TRAINING PROGRAM
Payer: MEDICARE

## 2024-07-30 ENCOUNTER — APPOINTMENT (OUTPATIENT)
Facility: HOSPITAL | Age: 69
End: 2024-07-30
Payer: MEDICARE

## 2024-07-30 VITALS
HEART RATE: 70 BPM | WEIGHT: 227.96 LBS | BODY MASS INDEX: 33.76 KG/M2 | TEMPERATURE: 97.8 F | SYSTOLIC BLOOD PRESSURE: 173 MMHG | OXYGEN SATURATION: 99 % | HEIGHT: 69 IN | DIASTOLIC BLOOD PRESSURE: 86 MMHG | RESPIRATION RATE: 18 BRPM

## 2024-07-30 DIAGNOSIS — M70.22 OLECRANON BURSITIS OF LEFT ELBOW: Primary | ICD-10-CM

## 2024-07-30 LAB
ALBUMIN SERPL-MCNC: 3.5 G/DL (ref 3.5–5)
ALBUMIN/GLOB SERPL: 0.8 (ref 1.1–2.2)
ALP SERPL-CCNC: 71 U/L (ref 45–117)
ALT SERPL-CCNC: 36 U/L (ref 12–78)
ANION GAP SERPL CALC-SCNC: 3 MMOL/L (ref 5–15)
AST SERPL-CCNC: 26 U/L (ref 15–37)
BASOPHILS # BLD: 0.1 K/UL (ref 0–0.1)
BASOPHILS NFR BLD: 1 % (ref 0–1)
BILIRUB SERPL-MCNC: 0.3 MG/DL (ref 0.2–1)
BUN SERPL-MCNC: 16 MG/DL (ref 6–20)
BUN/CREAT SERPL: 14 (ref 12–20)
CALCIUM SERPL-MCNC: 9 MG/DL (ref 8.5–10.1)
CHLORIDE SERPL-SCNC: 108 MMOL/L (ref 97–108)
CO2 SERPL-SCNC: 27 MMOL/L (ref 21–32)
COMMENT:: NORMAL
CREAT SERPL-MCNC: 1.16 MG/DL (ref 0.7–1.3)
CRP SERPL-MCNC: 0.8 MG/DL (ref 0–0.3)
DIFFERENTIAL METHOD BLD: NORMAL
EOSINOPHIL # BLD: 0.4 K/UL (ref 0–0.4)
EOSINOPHIL NFR BLD: 6 % (ref 0–7)
ERYTHROCYTE [DISTWIDTH] IN BLOOD BY AUTOMATED COUNT: 14 % (ref 11.5–14.5)
ERYTHROCYTE [SEDIMENTATION RATE] IN BLOOD: 24 MM/HR (ref 0–20)
GLOBULIN SER CALC-MCNC: 4.5 G/DL (ref 2–4)
GLUCOSE SERPL-MCNC: 114 MG/DL (ref 65–100)
HCT VFR BLD AUTO: 41.9 % (ref 36.6–50.3)
HGB BLD-MCNC: 14.4 G/DL (ref 12.1–17)
IMM GRANULOCYTES # BLD AUTO: 0 K/UL (ref 0–0.04)
IMM GRANULOCYTES NFR BLD AUTO: 0 % (ref 0–0.5)
LYMPHOCYTES # BLD: 3 K/UL (ref 0.8–3.5)
LYMPHOCYTES NFR BLD: 43 % (ref 12–49)
MCH RBC QN AUTO: 31.2 PG (ref 26–34)
MCHC RBC AUTO-ENTMCNC: 34.4 G/DL (ref 30–36.5)
MCV RBC AUTO: 90.7 FL (ref 80–99)
MONOCYTES # BLD: 0.5 K/UL (ref 0–1)
MONOCYTES NFR BLD: 7 % (ref 5–13)
NEUTS SEG # BLD: 3 K/UL (ref 1.8–8)
NEUTS SEG NFR BLD: 43 % (ref 32–75)
NRBC # BLD: 0 K/UL (ref 0–0.01)
NRBC BLD-RTO: 0 PER 100 WBC
PLATELET # BLD AUTO: 190 K/UL (ref 150–400)
PMV BLD AUTO: 10.9 FL (ref 8.9–12.9)
POTASSIUM SERPL-SCNC: 3.8 MMOL/L (ref 3.5–5.1)
PROT SERPL-MCNC: 8 G/DL (ref 6.4–8.2)
RBC # BLD AUTO: 4.62 M/UL (ref 4.1–5.7)
SODIUM SERPL-SCNC: 138 MMOL/L (ref 136–145)
SPECIMEN HOLD: NORMAL
WBC # BLD AUTO: 6.9 K/UL (ref 4.1–11.1)

## 2024-07-30 PROCEDURE — 96374 THER/PROPH/DIAG INJ IV PUSH: CPT

## 2024-07-30 PROCEDURE — 6360000002 HC RX W HCPCS: Performed by: PHYSICIAN ASSISTANT

## 2024-07-30 PROCEDURE — 36415 COLL VENOUS BLD VENIPUNCTURE: CPT

## 2024-07-30 PROCEDURE — 86140 C-REACTIVE PROTEIN: CPT

## 2024-07-30 PROCEDURE — 85025 COMPLETE CBC W/AUTO DIFF WBC: CPT

## 2024-07-30 PROCEDURE — 99284 EMERGENCY DEPT VISIT MOD MDM: CPT

## 2024-07-30 PROCEDURE — 80053 COMPREHEN METABOLIC PANEL: CPT

## 2024-07-30 PROCEDURE — 73080 X-RAY EXAM OF ELBOW: CPT

## 2024-07-30 PROCEDURE — 85652 RBC SED RATE AUTOMATED: CPT

## 2024-07-30 RX ORDER — KETOROLAC TROMETHAMINE 30 MG/ML
15 INJECTION, SOLUTION INTRAMUSCULAR; INTRAVENOUS
Status: COMPLETED | OUTPATIENT
Start: 2024-07-30 | End: 2024-07-30

## 2024-07-30 RX ORDER — NAPROXEN 500 MG/1
500 TABLET ORAL 2 TIMES DAILY
Qty: 15 TABLET | Refills: 0 | Status: SHIPPED | OUTPATIENT
Start: 2024-07-30

## 2024-07-30 RX ADMIN — KETOROLAC TROMETHAMINE 15 MG: 30 INJECTION, SOLUTION INTRAMUSCULAR at 18:01

## 2024-07-30 ASSESSMENT — PAIN DESCRIPTION - PAIN TYPE: TYPE: ACUTE PAIN

## 2024-07-30 ASSESSMENT — PAIN - FUNCTIONAL ASSESSMENT
PAIN_FUNCTIONAL_ASSESSMENT: 0-10
PAIN_FUNCTIONAL_ASSESSMENT: PREVENTS OR INTERFERES SOME ACTIVE ACTIVITIES AND ADLS
PAIN_FUNCTIONAL_ASSESSMENT: 0-10

## 2024-07-30 ASSESSMENT — PAIN DESCRIPTION - DESCRIPTORS
DESCRIPTORS: SHARP;SHOOTING
DESCRIPTORS: THROBBING

## 2024-07-30 ASSESSMENT — PAIN DESCRIPTION - LOCATION
LOCATION: ARM
LOCATION: ARM

## 2024-07-30 ASSESSMENT — PAIN SCALES - GENERAL
PAINLEVEL_OUTOF10: 8
PAINLEVEL_OUTOF10: 7
PAINLEVEL_OUTOF10: 5

## 2024-07-30 ASSESSMENT — PAIN DESCRIPTION - ORIENTATION
ORIENTATION: LEFT
ORIENTATION: LEFT

## 2024-07-30 ASSESSMENT — PAIN DESCRIPTION - FREQUENCY: FREQUENCY: CONTINUOUS

## 2024-07-30 NOTE — ED TRIAGE NOTES
Patient reports left arm swelling/pain that started on Friday. Says he lifts heavy boxes at work.

## 2024-07-30 NOTE — DISCHARGE INSTRUCTIONS
Return to the ER for new or worsening symptoms such as fever, worsening swelling, redness.  As we discussed, your exam is consistent with likely olecranon bursitis, or inflammation of the cushioning over the tip of the elbow.  It does not appear to be infected at this time.  You may apply ice.  You have been prescribed a short course of an anti-inflammatory medication that should help with the pain.  Call and make a follow-up appointment with Ortho.

## 2024-08-21 ENCOUNTER — TELEPHONE (OUTPATIENT)
Dept: PRIMARY CARE CLINIC | Facility: CLINIC | Age: 69
End: 2024-08-21

## 2024-08-21 NOTE — TELEPHONE ENCOUNTER
----- Message from Aquilino BELL sent at 8/21/2024  8:13 AM EDT -----  Regarding: [facility] Appointment Request  E[facility] Appointment Request    Patient needs appointment for [facility] Appointment Type: New to Provider.    Patient Requested Dates(s):August 29,September 13, or September 17, 2024  Patient Requested Time:for aug.29 and sept.13 anytime and on sept. 17 needs to be after 12 pm  Provider Name:Dwayne Tate MD    Reason for Appointment Request: New Patient - Available appointments did not meet patient need. Patient's wife wants to reschedule his  appointment for a new to provider with Dwayne Tate MD to get blood pressure and sugar check. If the provider doesn't have available on the dates it is okay with other provider  --------------------------------------------------------------------------------------------------------------------------    Relationship to Patient: Spouse/Partner wife     Call Back Information: OK to leave message on voicemail  Preferred Call Back Number: Phone [phone]

## 2024-08-22 DIAGNOSIS — E11.9 TYPE 2 DIABETES MELLITUS WITHOUT COMPLICATION, WITHOUT LONG-TERM CURRENT USE OF INSULIN (HCC): ICD-10-CM

## 2024-08-22 DIAGNOSIS — I10 ESSENTIAL HYPERTENSION: ICD-10-CM

## 2024-08-22 RX ORDER — AMLODIPINE BESYLATE 10 MG/1
10 TABLET ORAL DAILY
Qty: 30 TABLET | Refills: 0 | Status: SHIPPED | OUTPATIENT
Start: 2024-08-22

## 2024-08-22 RX ORDER — GLIMEPIRIDE 1 MG/1
1 TABLET ORAL
Qty: 30 TABLET | Refills: 0 | Status: SHIPPED | OUTPATIENT
Start: 2024-08-22

## 2024-08-22 NOTE — TELEPHONE ENCOUNTER
Patient had to reschedule his appointment to September but he is out of his blood pressure medicine.  He needs amlodipine and glimepiride.

## 2024-09-15 DIAGNOSIS — E11.9 TYPE 2 DIABETES MELLITUS WITHOUT COMPLICATION, WITHOUT LONG-TERM CURRENT USE OF INSULIN (HCC): ICD-10-CM

## 2024-09-15 DIAGNOSIS — I10 ESSENTIAL HYPERTENSION: ICD-10-CM

## 2024-09-16 RX ORDER — AMLODIPINE BESYLATE 10 MG/1
10 TABLET ORAL DAILY
Qty: 90 TABLET | Refills: 1 | OUTPATIENT
Start: 2024-09-16

## 2024-09-16 RX ORDER — GLIMEPIRIDE 1 MG/1
1 TABLET ORAL
Qty: 90 TABLET | Refills: 1 | OUTPATIENT
Start: 2024-09-16

## 2024-09-19 ENCOUNTER — OFFICE VISIT (OUTPATIENT)
Dept: PRIMARY CARE CLINIC | Facility: CLINIC | Age: 69
End: 2024-09-19

## 2024-09-19 VITALS
TEMPERATURE: 97.5 F | RESPIRATION RATE: 16 BRPM | HEART RATE: 66 BPM | WEIGHT: 225 LBS | BODY MASS INDEX: 33.33 KG/M2 | HEIGHT: 69 IN | DIASTOLIC BLOOD PRESSURE: 75 MMHG | SYSTOLIC BLOOD PRESSURE: 157 MMHG | OXYGEN SATURATION: 97 %

## 2024-09-19 DIAGNOSIS — E11.9 ENCOUNTER FOR DIABETIC FOOT EXAM (HCC): ICD-10-CM

## 2024-09-19 DIAGNOSIS — C61 PROSTATE CANCER (HCC): ICD-10-CM

## 2024-09-19 DIAGNOSIS — I42.9 CARDIOMYOPATHY, UNSPECIFIED TYPE (HCC): Primary | ICD-10-CM

## 2024-09-19 DIAGNOSIS — I77.810 DILATED AORTIC ROOT (HCC): ICD-10-CM

## 2024-09-19 DIAGNOSIS — E11.8 TYPE 2 DIABETES MELLITUS WITH UNSPECIFIED COMPLICATIONS (HCC): ICD-10-CM

## 2024-09-19 DIAGNOSIS — I73.9 CLAUDICATION (HCC): ICD-10-CM

## 2024-09-19 DIAGNOSIS — Z12.5 PROSTATE CANCER SCREENING: ICD-10-CM

## 2024-09-19 DIAGNOSIS — E78.5 HYPERLIPIDEMIA LDL GOAL <70: ICD-10-CM

## 2024-09-19 DIAGNOSIS — E79.0 HYPERURICEMIA: ICD-10-CM

## 2024-09-19 DIAGNOSIS — I10 ESSENTIAL HYPERTENSION: ICD-10-CM

## 2024-09-19 DIAGNOSIS — I71.9 AORTIC ANEURYSM WITHOUT RUPTURE, UNSPECIFIED PORTION OF AORTA (HCC): ICD-10-CM

## 2024-09-19 DIAGNOSIS — R01.1 HEART MURMUR: ICD-10-CM

## 2024-09-19 LAB
ALBUMIN SERPL-MCNC: 4 G/DL (ref 3.5–5)
ALBUMIN/GLOB SERPL: 1 (ref 1.1–2.2)
ALP SERPL-CCNC: 72 U/L (ref 45–117)
ALT SERPL-CCNC: 38 U/L (ref 12–78)
ANION GAP SERPL CALC-SCNC: 2 MMOL/L (ref 2–12)
AST SERPL-CCNC: 25 U/L (ref 15–37)
BILIRUB SERPL-MCNC: 0.5 MG/DL (ref 0.2–1)
BUN SERPL-MCNC: 16 MG/DL (ref 6–20)
BUN/CREAT SERPL: 14 (ref 12–20)
CALCIUM SERPL-MCNC: 9.3 MG/DL (ref 8.5–10.1)
CHLORIDE SERPL-SCNC: 108 MMOL/L (ref 97–108)
CHOLEST SERPL-MCNC: 194 MG/DL
CO2 SERPL-SCNC: 29 MMOL/L (ref 21–32)
COMMENT:: NORMAL
CREAT SERPL-MCNC: 1.17 MG/DL (ref 0.7–1.3)
CREAT UR-MCNC: 145 MG/DL
GLOBULIN SER CALC-MCNC: 4.2 G/DL (ref 2–4)
GLUCOSE SERPL-MCNC: 117 MG/DL (ref 65–100)
HBA1C MFR BLD: 6.5 %
HDLC SERPL-MCNC: 51 MG/DL
HDLC SERPL: 3.8 (ref 0–5)
LDLC SERPL CALC-MCNC: 126.6 MG/DL (ref 0–100)
MICROALBUMIN UR-MCNC: 0.86 MG/DL
MICROALBUMIN/CREAT UR-RTO: 6 MG/G (ref 0–30)
POTASSIUM SERPL-SCNC: 4.1 MMOL/L (ref 3.5–5.1)
PROT SERPL-MCNC: 8.2 G/DL (ref 6.4–8.2)
PSA SERPL-MCNC: 7.1 NG/ML (ref 0.01–4)
SODIUM SERPL-SCNC: 139 MMOL/L (ref 136–145)
SPECIMEN HOLD: NORMAL
SPECIMEN HOLD: NORMAL
TRIGL SERPL-MCNC: 82 MG/DL
URATE SERPL-MCNC: 8.4 MG/DL (ref 3.5–7.2)
VLDLC SERPL CALC-MCNC: 16.4 MG/DL

## 2024-09-19 RX ORDER — ROSUVASTATIN CALCIUM 20 MG/1
20 TABLET, COATED ORAL
Qty: 90 TABLET | Refills: 1 | Status: SHIPPED | OUTPATIENT
Start: 2024-09-19

## 2024-09-19 RX ORDER — ALLOPURINOL 300 MG/1
300 TABLET ORAL DAILY
Qty: 90 TABLET | Refills: 1 | Status: SHIPPED | OUTPATIENT
Start: 2024-09-19

## 2024-09-19 SDOH — ECONOMIC STABILITY: FOOD INSECURITY: WITHIN THE PAST 12 MONTHS, THE FOOD YOU BOUGHT JUST DIDN'T LAST AND YOU DIDN'T HAVE MONEY TO GET MORE.: NEVER TRUE

## 2024-09-19 SDOH — ECONOMIC STABILITY: INCOME INSECURITY: HOW HARD IS IT FOR YOU TO PAY FOR THE VERY BASICS LIKE FOOD, HOUSING, MEDICAL CARE, AND HEATING?: NOT HARD AT ALL

## 2024-09-19 SDOH — ECONOMIC STABILITY: FOOD INSECURITY: WITHIN THE PAST 12 MONTHS, YOU WORRIED THAT YOUR FOOD WOULD RUN OUT BEFORE YOU GOT MONEY TO BUY MORE.: NEVER TRUE

## 2024-09-19 ASSESSMENT — PATIENT HEALTH QUESTIONNAIRE - PHQ9
SUM OF ALL RESPONSES TO PHQ QUESTIONS 1-9: 0
SUM OF ALL RESPONSES TO PHQ9 QUESTIONS 1 & 2: 0
SUM OF ALL RESPONSES TO PHQ QUESTIONS 1-9: 0
1. LITTLE INTEREST OR PLEASURE IN DOING THINGS: NOT AT ALL
SUM OF ALL RESPONSES TO PHQ QUESTIONS 1-9: 0
2. FEELING DOWN, DEPRESSED OR HOPELESS: NOT AT ALL
SUM OF ALL RESPONSES TO PHQ QUESTIONS 1-9: 0

## 2024-09-22 DIAGNOSIS — I10 ESSENTIAL HYPERTENSION: ICD-10-CM

## 2024-09-22 DIAGNOSIS — E11.9 TYPE 2 DIABETES MELLITUS WITHOUT COMPLICATION, WITHOUT LONG-TERM CURRENT USE OF INSULIN (HCC): ICD-10-CM

## 2024-09-23 RX ORDER — AMLODIPINE BESYLATE 10 MG/1
10 TABLET ORAL DAILY
Qty: 30 TABLET | Refills: 3 | Status: SHIPPED | OUTPATIENT
Start: 2024-09-23

## 2024-09-23 RX ORDER — GLIMEPIRIDE 1 MG/1
1 TABLET ORAL
Qty: 30 TABLET | Refills: 3 | Status: SHIPPED | OUTPATIENT
Start: 2024-09-23

## 2025-01-04 DIAGNOSIS — I10 ESSENTIAL HYPERTENSION: ICD-10-CM

## 2025-01-04 DIAGNOSIS — E11.9 TYPE 2 DIABETES MELLITUS WITHOUT COMPLICATION, WITHOUT LONG-TERM CURRENT USE OF INSULIN (HCC): ICD-10-CM

## 2025-01-06 RX ORDER — AMLODIPINE BESYLATE 10 MG/1
10 TABLET ORAL DAILY
Qty: 90 TABLET | Refills: 1 | Status: SHIPPED | OUTPATIENT
Start: 2025-01-06

## 2025-01-06 RX ORDER — GLIMEPIRIDE 1 MG/1
1 TABLET ORAL
Qty: 90 TABLET | Refills: 1 | Status: SHIPPED | OUTPATIENT
Start: 2025-01-06

## 2025-01-23 ENCOUNTER — HOSPITAL ENCOUNTER (EMERGENCY)
Facility: HOSPITAL | Age: 70
Discharge: HOME | End: 2025-01-23
Attending: EMERGENCY MEDICINE
Payer: MEDICARE

## 2025-01-23 VITALS
TEMPERATURE: 97.7 F | WEIGHT: 232.81 LBS | OXYGEN SATURATION: 100 % | BODY MASS INDEX: 34.48 KG/M2 | RESPIRATION RATE: 18 BRPM | DIASTOLIC BLOOD PRESSURE: 85 MMHG | SYSTOLIC BLOOD PRESSURE: 165 MMHG | HEIGHT: 69 IN | HEART RATE: 65 BPM

## 2025-01-23 DIAGNOSIS — L03.011 PARONYCHIA OF FINGER OF RIGHT HAND: Primary | ICD-10-CM

## 2025-01-23 PROCEDURE — 26010 DRAINAGE OF FINGER ABSCESS: CPT

## 2025-01-23 PROCEDURE — 99283 EMERGENCY DEPT VISIT LOW MDM: CPT

## 2025-01-23 PROCEDURE — 6370000000 HC RX 637 (ALT 250 FOR IP)

## 2025-01-23 RX ORDER — DOXYCYCLINE HYCLATE 100 MG
100 TABLET ORAL 2 TIMES DAILY
Qty: 10 TABLET | Refills: 0 | Status: SHIPPED | OUTPATIENT
Start: 2025-01-23 | End: 2025-01-28

## 2025-01-23 RX ADMIN — Medication 3 ML: at 12:26

## 2025-01-23 ASSESSMENT — PAIN - FUNCTIONAL ASSESSMENT
PAIN_FUNCTIONAL_ASSESSMENT: PREVENTS OR INTERFERES SOME ACTIVE ACTIVITIES AND ADLS
PAIN_FUNCTIONAL_ASSESSMENT: 0-10

## 2025-01-23 ASSESSMENT — PAIN DESCRIPTION - ONSET: ONSET: ON-GOING

## 2025-01-23 ASSESSMENT — PAIN DESCRIPTION - LOCATION: LOCATION: FINGER (COMMENT WHICH ONE)

## 2025-01-23 ASSESSMENT — PAIN DESCRIPTION - ORIENTATION: ORIENTATION: RIGHT

## 2025-01-23 ASSESSMENT — PAIN DESCRIPTION - PAIN TYPE: TYPE: ACUTE PAIN

## 2025-01-23 ASSESSMENT — PAIN DESCRIPTION - DESCRIPTORS: DESCRIPTORS: ACHING;BURNING;DISCOMFORT

## 2025-01-23 ASSESSMENT — PAIN DESCRIPTION - FREQUENCY: FREQUENCY: CONTINUOUS

## 2025-01-23 ASSESSMENT — PAIN SCALES - GENERAL: PAINLEVEL_OUTOF10: 8

## 2025-01-23 NOTE — DISCHARGE INSTRUCTIONS
Please read the attached handout for wound care instructions. Take the full course of prescribed antibiotics. Do not hesitate to return to the ED if symptoms change or worsen.

## 2025-01-23 NOTE — ED PROVIDER NOTES
[facility] EMERGENCY DEPARTMENT  EMERGENCY DEPARTMENT ENCOUNTER      Pt Name: Jose Reynaga  MRN: [id number]  Birthdate 1955  Date of evaluation: 1/23/2025  Provider: Liane Duggan PA-C    CHIEF COMPLAINT       Chief Complaint   Patient presents with    Finger Pain         HISTORY OF PRESENT ILLNESS   (Location/Symptom, Timing/Onset, Context/Setting, Quality, Duration, Modifying Factors, Severity)  Note limiting factors.   69-year-old male presenting with right middle finger swelling and pain that began on Monday.  States that he had a mild amount of drainage already but still has pain. Denies associated fever, numbness, tingling, decreased range of motion.            Review of External Medical Records:     Nursing Notes were reviewed.    REVIEW OF SYSTEMS    (2-9 systems for level 4, 10 or more for level 5)     Review of Systems    Except as noted above the remainder of the review of systems was reviewed and negative.       PAST MEDICAL HISTORY     Past Medical History:   Diagnosis Date    Diabetes (HCC)     Hypertension          SURGICAL HISTORY       Past Surgical History:   Procedure Laterality Date    ORTHOPEDIC SURGERY      Achilles Tendon/ left foot          CURRENT MEDICATIONS       Discharge Medication List as of 1/23/2025  1:22 PM        CONTINUE these medications which have NOT CHANGED    Details   glimepiride (AMARYL) 1 MG tablet TAKE 1 TABLET BY MOUTH EVERY DAY BEFORE BREAKFAST, Disp-90 tablet, R-1Normal      amLODIPine (NORVASC) 10 MG tablet TAKE 1 TABLET BY MOUTH EVERY DAY, Disp-90 tablet, R-1Normal      rosuvastatin (CRESTOR) 20 MG tablet Take 1 tablet by mouth nightly, Disp-90 tablet, R-1Appointment needed for future refillsNormal      allopurinol (ZYLOPRIM) 300 MG tablet Take 1 tablet by mouth daily, Disp-90 tablet, R-1Normal      indomethacin (INDOCIN) 50 MG capsule Take 1 capsule by mouth 3 times daily (with meals) for 5 days, Disp-15 capsule, R-0Normal      lidocaine viscous hcl

## 2025-01-23 NOTE — ED TRIAGE NOTES
Patient reports swelling and pain at cuticle site of right middle finger since Monday. Denies fever. Some drainage reported.

## 2025-05-20 ENCOUNTER — OFFICE VISIT (OUTPATIENT)
Dept: PRIMARY CARE CLINIC | Facility: CLINIC | Age: 70
End: 2025-05-20
Payer: MEDICARE

## 2025-05-20 VITALS
OXYGEN SATURATION: 98 % | SYSTOLIC BLOOD PRESSURE: 155 MMHG | HEIGHT: 69 IN | BODY MASS INDEX: 34.51 KG/M2 | RESPIRATION RATE: 16 BRPM | WEIGHT: 233 LBS | HEART RATE: 72 BPM | DIASTOLIC BLOOD PRESSURE: 79 MMHG

## 2025-05-20 DIAGNOSIS — E11.9 ENCOUNTER FOR DIABETIC FOOT EXAM (HCC): ICD-10-CM

## 2025-05-20 DIAGNOSIS — E11.8 TYPE 2 DIABETES MELLITUS WITH UNSPECIFIED COMPLICATIONS (HCC): ICD-10-CM

## 2025-05-20 DIAGNOSIS — E79.0 HYPERURICEMIA: ICD-10-CM

## 2025-05-20 DIAGNOSIS — C61 PROSTATE CANCER (HCC): ICD-10-CM

## 2025-05-20 DIAGNOSIS — I42.9 CARDIOMYOPATHY, UNSPECIFIED TYPE (HCC): ICD-10-CM

## 2025-05-20 DIAGNOSIS — R97.20 ELEVATED PSA: ICD-10-CM

## 2025-05-20 DIAGNOSIS — E11.8 TYPE 2 DIABETES MELLITUS WITH UNSPECIFIED COMPLICATIONS (HCC): Primary | ICD-10-CM

## 2025-05-20 PROCEDURE — 1123F ACP DISCUSS/DSCN MKR DOCD: CPT | Performed by: INTERNAL MEDICINE

## 2025-05-20 PROCEDURE — 1159F MED LIST DOCD IN RCRD: CPT | Performed by: INTERNAL MEDICINE

## 2025-05-20 PROCEDURE — 99214 OFFICE O/P EST MOD 30 MIN: CPT | Performed by: INTERNAL MEDICINE

## 2025-05-20 PROCEDURE — 3078F DIAST BP <80 MM HG: CPT | Performed by: INTERNAL MEDICINE

## 2025-05-20 PROCEDURE — 3077F SYST BP >= 140 MM HG: CPT | Performed by: INTERNAL MEDICINE

## 2025-05-20 RX ORDER — ALLOPURINOL 300 MG/1
300 TABLET ORAL DAILY
Qty: 90 TABLET | Refills: 1 | Status: SHIPPED | OUTPATIENT
Start: 2025-05-20

## 2025-05-20 SDOH — ECONOMIC STABILITY: FOOD INSECURITY: WITHIN THE PAST 12 MONTHS, YOU WORRIED THAT YOUR FOOD WOULD RUN OUT BEFORE YOU GOT MONEY TO BUY MORE.: NEVER TRUE

## 2025-05-20 SDOH — ECONOMIC STABILITY: FOOD INSECURITY: WITHIN THE PAST 12 MONTHS, THE FOOD YOU BOUGHT JUST DIDN'T LAST AND YOU DIDN'T HAVE MONEY TO GET MORE.: NEVER TRUE

## 2025-05-20 ASSESSMENT — PATIENT HEALTH QUESTIONNAIRE - PHQ9
SUM OF ALL RESPONSES TO PHQ QUESTIONS 1-9: 0
SUM OF ALL RESPONSES TO PHQ QUESTIONS 1-9: 0
1. LITTLE INTEREST OR PLEASURE IN DOING THINGS: NOT AT ALL
SUM OF ALL RESPONSES TO PHQ QUESTIONS 1-9: 0
2. FEELING DOWN, DEPRESSED OR HOPELESS: NOT AT ALL
SUM OF ALL RESPONSES TO PHQ QUESTIONS 1-9: 0

## 2025-05-20 NOTE — PROGRESS NOTES
Chief Complaint   Patient presents with    Annual Exam     BP (!) 160/71   Pulse 72   Resp 16   Ht 1.753 m (5' 9\")   Wt 105.7 kg (233 lb)   SpO2 98%   BMI 34.41 kg/m²   Have you been to the ER, urgent care clinic since your last visit?  Hospitalized since your last visit?   NO    Have you seen or consulted any other health care providers outside our system since your last visit?   NO      “Have you had a colorectal cancer screening such as a colonoscopy/FIT/Cologuard?    NO    No colonoscopy on file  No cologuard on file  No FIT/FOBT on file   No flexible sigmoidoscopy on file     “Have you had a diabetic eye exam?”    NO     No diabetic eye exam on file

## 2025-05-20 NOTE — PROGRESS NOTES
Jose Reynaga is a 69 y.o. male and presents with     Chief Complaint   Patient presents with    Annual Exam       History of Present Illness  The patient presents for a physical exam.    Reports consistently elevated blood pressure readings during clinic visits, with the most recent home reading being 140/50. Plans to schedule a cardiologist appointment soon. Currently taking amlodipine.    History of prostate cancer under urologist care. Opted for a second opinion instead of a recommended biopsy. PSA levels gradually increasing, latest reading 7.1 in 09/2024. Has not undergone a colonoscopy.    Taking allopurinol for gout management, requires refill. Also taking medication for blood sugar control.    Prescribed iron supplements for muscle spasms but did not get them refilled.         Past Medical History:   Diagnosis Date    Diabetes (HCC)     Hypertension      Past Surgical History:   Procedure Laterality Date    ORTHOPEDIC SURGERY      Achilles Tendon/ left foot      Current Outpatient Medications   Medication Sig    allopurinol (ZYLOPRIM) 300 MG tablet Take 1 tablet by mouth daily    glimepiride (AMARYL) 1 MG tablet TAKE 1 TABLET BY MOUTH EVERY DAY BEFORE BREAKFAST    amLODIPine (NORVASC) 10 MG tablet TAKE 1 TABLET BY MOUTH EVERY DAY    rosuvastatin (CRESTOR) 20 MG tablet Take 1 tablet by mouth nightly    lidocaine viscous hcl (XYLOCAINE) 2 % SOLN solution Take 15 mLs by mouth as needed for Irritation    Lancets MISC Please use one lancet with every blood sugar reading    aspirin 81 MG chewable tablet Take 1 tablet by mouth daily    indomethacin (INDOCIN) 50 MG capsule Take 1 capsule by mouth 3 times daily (with meals) for 5 days     No current facility-administered medications for this visit.     Health Maintenance   Topic Date Due    Diabetic retinal exam  Never done    DTaP/Tdap/Td vaccine (1 - Tdap) Never done    Colorectal Cancer Screen  Never done    Shingles vaccine (1 of 2) Never done    Respiratory

## 2025-05-21 ENCOUNTER — RESULTS FOLLOW-UP (OUTPATIENT)
Dept: PRIMARY CARE CLINIC | Facility: CLINIC | Age: 70
End: 2025-05-21

## 2025-05-21 LAB
ALBUMIN SERPL-MCNC: 4 G/DL (ref 3.5–5)
ALBUMIN/GLOB SERPL: 0.9 (ref 1.1–2.2)
ALP SERPL-CCNC: 64 U/L (ref 45–117)
ALT SERPL-CCNC: 43 U/L (ref 12–78)
ANION GAP SERPL CALC-SCNC: 7 MMOL/L (ref 2–12)
APPEARANCE UR: CLEAR
AST SERPL-CCNC: 31 U/L (ref 15–37)
BILIRUB SERPL-MCNC: 0.5 MG/DL (ref 0.2–1)
BILIRUB UR QL: NEGATIVE
BUN SERPL-MCNC: 19 MG/DL (ref 6–20)
BUN/CREAT SERPL: 15 (ref 12–20)
CALCIUM SERPL-MCNC: 9 MG/DL (ref 8.5–10.1)
CHLORIDE SERPL-SCNC: 105 MMOL/L (ref 97–108)
CHOLEST SERPL-MCNC: 190 MG/DL
CO2 SERPL-SCNC: 27 MMOL/L (ref 21–32)
COLOR UR: NORMAL
CREAT SERPL-MCNC: 1.24 MG/DL (ref 0.7–1.3)
ERYTHROCYTE [DISTWIDTH] IN BLOOD BY AUTOMATED COUNT: 14.1 % (ref 11.5–14.5)
EST. AVERAGE GLUCOSE BLD GHB EST-MCNC: 137 MG/DL
GLOBULIN SER CALC-MCNC: 4.5 G/DL (ref 2–4)
GLUCOSE SERPL-MCNC: 108 MG/DL (ref 65–100)
GLUCOSE UR STRIP.AUTO-MCNC: NEGATIVE MG/DL
HBA1C MFR BLD: 6.4 % (ref 4–5.6)
HCT VFR BLD AUTO: 45.8 % (ref 36.6–50.3)
HDLC SERPL-MCNC: 51 MG/DL
HDLC SERPL: 3.7 (ref 0–5)
HGB BLD-MCNC: 15.4 G/DL (ref 12.1–17)
HGB UR QL STRIP: NEGATIVE
KETONES UR QL STRIP.AUTO: NEGATIVE MG/DL
LDLC SERPL CALC-MCNC: 119.2 MG/DL (ref 0–100)
LEUKOCYTE ESTERASE UR QL STRIP.AUTO: NEGATIVE
MCH RBC QN AUTO: 30.7 PG (ref 26–34)
MCHC RBC AUTO-ENTMCNC: 33.6 G/DL (ref 30–36.5)
MCV RBC AUTO: 91.2 FL (ref 80–99)
NITRITE UR QL STRIP.AUTO: NEGATIVE
NRBC # BLD: 0 K/UL (ref 0–0.01)
NRBC BLD-RTO: 0 PER 100 WBC
PH UR STRIP: 5.5 (ref 5–8)
PLATELET # BLD AUTO: 194 K/UL (ref 150–400)
PMV BLD AUTO: 11.6 FL (ref 8.9–12.9)
POTASSIUM SERPL-SCNC: 4.4 MMOL/L (ref 3.5–5.1)
PROT SERPL-MCNC: 8.5 G/DL (ref 6.4–8.2)
PROT UR STRIP-MCNC: NEGATIVE MG/DL
PSA SERPL-MCNC: 7.4 NG/ML (ref 0.01–4)
RBC # BLD AUTO: 5.02 M/UL (ref 4.1–5.7)
SODIUM SERPL-SCNC: 139 MMOL/L (ref 136–145)
SP GR UR REFRACTOMETRY: 1.02 (ref 1–1.03)
SPECIMEN HOLD: NORMAL
TRIGL SERPL-MCNC: 99 MG/DL
URATE SERPL-MCNC: 7.6 MG/DL (ref 3.5–7.2)
UROBILINOGEN UR QL STRIP.AUTO: 0.2 EU/DL (ref 0.2–1)
VLDLC SERPL CALC-MCNC: 19.8 MG/DL
WBC # BLD AUTO: 6.6 K/UL (ref 4.1–11.1)

## 2025-07-16 DIAGNOSIS — E11.9 TYPE 2 DIABETES MELLITUS WITHOUT COMPLICATION, WITHOUT LONG-TERM CURRENT USE OF INSULIN (HCC): ICD-10-CM

## 2025-07-16 DIAGNOSIS — I10 ESSENTIAL HYPERTENSION: ICD-10-CM

## 2025-07-16 RX ORDER — GLIMEPIRIDE 1 MG/1
1 TABLET ORAL
Qty: 90 TABLET | Refills: 1 | Status: SHIPPED | OUTPATIENT
Start: 2025-07-16

## 2025-07-16 RX ORDER — AMLODIPINE BESYLATE 10 MG/1
10 TABLET ORAL DAILY
Qty: 90 TABLET | Refills: 1 | Status: SHIPPED | OUTPATIENT
Start: 2025-07-16

## 2025-07-16 NOTE — TELEPHONE ENCOUNTER
PCP: Dwayne Tate MD    Last Visit 5/20/2025   Future Appointments   Date Time Provider Department Center   7/28/2025 11:20 AM Stew Nam MD [facility]   11/21/2025  9:45 AM Dwayne Tate MD [facility]       Requested Prescriptions     Pending Prescriptions Disp Refills    glimepiride (AMARYL) 1 MG tablet [Pharmacy Med Name: GLIMEPIRIDE 1 MG TABLET] 90 tablet 1     Sig: TAKE 1 TABLET BY MOUTH EVERY DAY BEFORE BREAKFAST    amLODIPine (NORVASC) 10 MG tablet [Pharmacy Med Name: AMLODIPINE BESYLATE 10 MG TAB] 90 tablet 1     Sig: TAKE 1 TABLET BY MOUTH EVERY DAY         Other Comments: Last Refill   01/06/25

## 2025-07-28 ENCOUNTER — OFFICE VISIT (OUTPATIENT)
Age: 70
End: 2025-07-28
Payer: MEDICARE

## 2025-07-28 VITALS
DIASTOLIC BLOOD PRESSURE: 82 MMHG | HEART RATE: 60 BPM | BODY MASS INDEX: 33.86 KG/M2 | SYSTOLIC BLOOD PRESSURE: 170 MMHG | OXYGEN SATURATION: 98 % | WEIGHT: 229.3 LBS

## 2025-07-28 DIAGNOSIS — R06.83 SNORING: ICD-10-CM

## 2025-07-28 DIAGNOSIS — I10 ESSENTIAL HYPERTENSION: Primary | ICD-10-CM

## 2025-07-28 PROCEDURE — 1159F MED LIST DOCD IN RCRD: CPT | Performed by: INTERNAL MEDICINE

## 2025-07-28 PROCEDURE — 93000 ELECTROCARDIOGRAM COMPLETE: CPT | Performed by: INTERNAL MEDICINE

## 2025-07-28 PROCEDURE — 3077F SYST BP >= 140 MM HG: CPT | Performed by: INTERNAL MEDICINE

## 2025-07-28 PROCEDURE — 99204 OFFICE O/P NEW MOD 45 MIN: CPT | Performed by: INTERNAL MEDICINE

## 2025-07-28 PROCEDURE — 3079F DIAST BP 80-89 MM HG: CPT | Performed by: INTERNAL MEDICINE

## 2025-07-28 PROCEDURE — 1123F ACP DISCUSS/DSCN MKR DOCD: CPT | Performed by: INTERNAL MEDICINE

## 2025-07-28 RX ORDER — VALSARTAN 80 MG/1
80 TABLET ORAL DAILY
Qty: 90 TABLET | Refills: 1 | Status: SHIPPED | OUTPATIENT
Start: 2025-07-28

## 2025-07-28 ASSESSMENT — PATIENT HEALTH QUESTIONNAIRE - PHQ9
1. LITTLE INTEREST OR PLEASURE IN DOING THINGS: NOT AT ALL
SUM OF ALL RESPONSES TO PHQ QUESTIONS 1-9: 0
2. FEELING DOWN, DEPRESSED OR HOPELESS: NOT AT ALL
SUM OF ALL RESPONSES TO PHQ QUESTIONS 1-9: 0

## 2025-07-28 NOTE — PATIENT INSTRUCTIONS
Dear Jose,    Thank you for visiting today. Your dedication to improving your health is greatly appreciated.    Following our consultation, here are the key instructions and recommendations for your care plan:    - Medications:    - Continue taking amlodipine 10 mg once daily    - Start taking valsartan  80 mg po at bedtime    - Medical Tests:    - Get an echocardiogram    - Undergo a sleep study    - Schedule blood work after 2 weeks of starting valsartan to check potassium levels    - Home Monitoring:    - Use a home blood pressure monitor (not wrist type)    - Follow proper procedure for accurate blood pressure readings    - Blood Pressure Goal:    - Keep blood pressure below 140/90    - Lifestyle Changes:    - Reduce salt intake, especially when eating out    - Follow-up:    - Return for a follow-up appointment in 3-4 months    Your health and well-being are our top priority. Please reach out if you have any questions or concerns.    Sincerely,    Stew Nam MD  Cardiology

## 2025-07-28 NOTE — PROGRESS NOTES
Cardiology  Clinic -   New Patient  Visit   Date of Service : 7/28/2025    Name: Jose Reynaga  Patient ID: [id number]  Age: 69 y.o. Sex: male YOB: 1955    Author: DAVIN PAL MD    PCP: Dwayne Tate MD    Referring Provider:Dwayne Tate MD    Reason for Visit / CC:    Chief Complaint   Patient presents with    New Patient    Hypertension         ASSESSMENT  AND PLAN           Assessment & Plan  1. Hypertension: Uncontrolled.  - Blood pressure 170/82 mmHg.  - Continue amlodipine 10 mg once daily in the morning.  - Add valsartan 80 mg once daily at bedtime.  - Reduce salt intake and consider beet juice to help manage blood pressure.  - Use a home blood pressure monitor for regular monitoring.  - Order an echocardiogram to assess current cardiac function.  - Perform blood work after 2 weeks of starting valsartan to monitor potassium levels.  - If left ventricular dysfunction is observed, consider switching from valsartan to Entresto and adding spironolactone along with Coreg.  -- Patient also complains of snoring will get sleep study to rule out obstructive sleep apnea    2. Type 2 diabetes mellitus: Borderline.  - Continue taking glimepiride.  - Maintain a balanced diet.  - A1c 6.4.    3. Hyperlipidemia.  Well-controlled  - Continue taking Crestor 20 mg once daily.    4. Obesity.  - Implement lifestyle modifications, including a balanced diet and regular exercise.    5.  History of dilated cardiomyopathy with ejection fraction of 50 to 55% low normal most likely due to hypertensive heart disease   We will repeat echo to assess left-ventricular systolic and diastolic function  If low would do further workup for dilated cardiomyopathy in etiology including ischemic workup-stress test  Also switch antihypertensive medication including amlodipine to guideline mediated heart failure therapy switching valsartan to Entresto  Discontinue amlodipine and add Coreg along with